# Patient Record
Sex: FEMALE | Race: WHITE | Employment: UNEMPLOYED | URBAN - METROPOLITAN AREA
[De-identification: names, ages, dates, MRNs, and addresses within clinical notes are randomized per-mention and may not be internally consistent; named-entity substitution may affect disease eponyms.]

---

## 2017-05-31 PROBLEM — R42 DIZZINESS: Status: ACTIVE | Noted: 2017-05-31

## 2017-06-05 ENCOUNTER — HOSPITAL ENCOUNTER (OUTPATIENT)
Dept: MAMMOGRAPHY | Age: 57
Discharge: HOME | End: 2017-06-05
Attending: INTERNAL MEDICINE
Payer: COMMERCIAL

## 2017-06-05 DIAGNOSIS — M85.80 OSTEOPENIA, UNSPECIFIED LOCATION: ICD-10-CM

## 2017-06-05 DIAGNOSIS — Z72.0 TOBACCO USE: ICD-10-CM

## 2017-06-05 DIAGNOSIS — Z78.0 POSTMENOPAUSAL: ICD-10-CM

## 2017-06-05 PROCEDURE — 77080 DXA BONE DENSITY AXIAL: CPT

## 2017-06-08 ENCOUNTER — APPOINTMENT (OUTPATIENT)
Dept: GENERAL RADIOLOGY | Age: 57
DRG: 189 | End: 2017-06-08
Attending: EMERGENCY MEDICINE
Payer: COMMERCIAL

## 2017-06-08 ENCOUNTER — HOSPITAL ENCOUNTER (INPATIENT)
Age: 57
LOS: 4 days | Discharge: HOME HEALTH | DRG: 189 | End: 2017-06-12
Attending: EMERGENCY MEDICINE | Admitting: INTERNAL MEDICINE
Payer: COMMERCIAL

## 2017-06-08 DIAGNOSIS — I10 ESSENTIAL HYPERTENSION: Chronic | ICD-10-CM

## 2017-06-08 DIAGNOSIS — J44.1 COPD EXACERBATION (HCC): ICD-10-CM

## 2017-06-08 DIAGNOSIS — J18.9 PNEUMONIA OF BOTH LOWER LOBES DUE TO INFECTIOUS ORGANISM: Primary | ICD-10-CM

## 2017-06-08 DIAGNOSIS — D75.1 POLYCYTHEMIA: ICD-10-CM

## 2017-06-08 DIAGNOSIS — R09.02 HYPOXEMIA: ICD-10-CM

## 2017-06-08 DIAGNOSIS — J40 TRACHEOBRONCHITIS: ICD-10-CM

## 2017-06-08 DIAGNOSIS — J42 CHRONIC BRONCHITIS, UNSPECIFIED CHRONIC BRONCHITIS TYPE (HCC): Chronic | ICD-10-CM

## 2017-06-08 DIAGNOSIS — R06.02 SHORTNESS OF BREATH: ICD-10-CM

## 2017-06-08 DIAGNOSIS — F41.9 ANXIETY: Chronic | ICD-10-CM

## 2017-06-08 DIAGNOSIS — Z72.0 TOBACCO USE: Chronic | ICD-10-CM

## 2017-06-08 PROBLEM — R06.00 DYSPNEA: Status: ACTIVE | Noted: 2017-06-08

## 2017-06-08 PROBLEM — J44.9 COPD (CHRONIC OBSTRUCTIVE PULMONARY DISEASE) (HCC): Chronic | Status: ACTIVE | Noted: 2017-06-08

## 2017-06-08 LAB
ALBUMIN SERPL BCP-MCNC: 3.4 G/DL (ref 3.5–5)
ALBUMIN/GLOB SERPL: 0.6 {RATIO} (ref 1.2–3.5)
ALP SERPL-CCNC: 103 U/L (ref 50–136)
ALT SERPL-CCNC: 25 U/L (ref 12–65)
ANION GAP BLD CALC-SCNC: 11 MMOL/L (ref 7–16)
AST SERPL W P-5'-P-CCNC: 51 U/L (ref 15–37)
BASOPHILS # BLD AUTO: 0 K/UL (ref 0–0.2)
BASOPHILS # BLD: 0 % (ref 0–2)
BILIRUB SERPL-MCNC: 0.8 MG/DL (ref 0.2–1.1)
BUN SERPL-MCNC: 8 MG/DL (ref 6–23)
CALCIUM SERPL-MCNC: 9.1 MG/DL (ref 8.3–10.4)
CHLORIDE SERPL-SCNC: 104 MMOL/L (ref 98–107)
CO2 SERPL-SCNC: 23 MMOL/L (ref 21–32)
CREAT SERPL-MCNC: 0.76 MG/DL (ref 0.6–1)
DIFFERENTIAL METHOD BLD: ABNORMAL
EOSINOPHIL # BLD: 0.1 K/UL (ref 0–0.8)
EOSINOPHIL NFR BLD: 1 % (ref 0.5–7.8)
ERYTHROCYTE [DISTWIDTH] IN BLOOD BY AUTOMATED COUNT: 14.3 % (ref 11.9–14.6)
GLOBULIN SER CALC-MCNC: 5.6 G/DL (ref 2.3–3.5)
GLUCOSE SERPL-MCNC: 115 MG/DL (ref 65–100)
HCT VFR BLD AUTO: 47.2 % (ref 35.8–46.3)
HGB BLD-MCNC: 16.5 G/DL (ref 11.7–15.4)
IMM GRANULOCYTES # BLD: 0 K/UL (ref 0–0.5)
IMM GRANULOCYTES NFR BLD AUTO: 0.3 % (ref 0–5)
LACTATE BLD-SCNC: 1.1 MMOL/L (ref 0.5–1.9)
LYMPHOCYTES # BLD AUTO: 9 % (ref 13–44)
LYMPHOCYTES # BLD: 1.2 K/UL (ref 0.5–4.6)
MCH RBC QN AUTO: 30.6 PG (ref 26.1–32.9)
MCHC RBC AUTO-ENTMCNC: 35 G/DL (ref 31.4–35)
MCV RBC AUTO: 87.4 FL (ref 79.6–97.8)
MONOCYTES # BLD: 1.1 K/UL (ref 0.1–1.3)
MONOCYTES NFR BLD AUTO: 8 % (ref 4–12)
NEUTS SEG # BLD: 10.8 K/UL (ref 1.7–8.2)
NEUTS SEG NFR BLD AUTO: 82 % (ref 43–78)
PLATELET # BLD AUTO: 285 K/UL (ref 150–450)
PMV BLD AUTO: 9.2 FL (ref 10.8–14.1)
POTASSIUM SERPL-SCNC: 3.8 MMOL/L (ref 3.5–5.1)
POTASSIUM SERPL-SCNC: 5.5 MMOL/L (ref 3.5–5.1)
PROT SERPL-MCNC: 9 G/DL (ref 6.3–8.2)
RBC # BLD AUTO: 5.4 M/UL (ref 4.05–5.25)
SODIUM SERPL-SCNC: 138 MMOL/L (ref 136–145)
TROPONIN I SERPL-MCNC: <0.02 NG/ML (ref 0.02–0.05)
WBC # BLD AUTO: 13.2 K/UL (ref 4.3–11.1)

## 2017-06-08 PROCEDURE — 94664 DEMO&/EVAL PT USE INHALER: CPT

## 2017-06-08 PROCEDURE — 87040 BLOOD CULTURE FOR BACTERIA: CPT | Performed by: EMERGENCY MEDICINE

## 2017-06-08 PROCEDURE — 85025 COMPLETE CBC W/AUTO DIFF WBC: CPT | Performed by: EMERGENCY MEDICINE

## 2017-06-08 PROCEDURE — 83605 ASSAY OF LACTIC ACID: CPT

## 2017-06-08 PROCEDURE — 65270000029 HC RM PRIVATE

## 2017-06-08 PROCEDURE — 71020 XR CHEST PA LAT: CPT

## 2017-06-08 PROCEDURE — 99284 EMERGENCY DEPT VISIT MOD MDM: CPT | Performed by: EMERGENCY MEDICINE

## 2017-06-08 PROCEDURE — 74011000250 HC RX REV CODE- 250: Performed by: EMERGENCY MEDICINE

## 2017-06-08 PROCEDURE — 84132 ASSAY OF SERUM POTASSIUM: CPT | Performed by: EMERGENCY MEDICINE

## 2017-06-08 PROCEDURE — 74011250637 HC RX REV CODE- 250/637: Performed by: INTERNAL MEDICINE

## 2017-06-08 PROCEDURE — 99223 1ST HOSP IP/OBS HIGH 75: CPT | Performed by: INTERNAL MEDICINE

## 2017-06-08 PROCEDURE — 74011000250 HC RX REV CODE- 250: Performed by: INTERNAL MEDICINE

## 2017-06-08 PROCEDURE — 80053 COMPREHEN METABOLIC PANEL: CPT | Performed by: EMERGENCY MEDICINE

## 2017-06-08 PROCEDURE — 84484 ASSAY OF TROPONIN QUANT: CPT | Performed by: EMERGENCY MEDICINE

## 2017-06-08 PROCEDURE — 74011250636 HC RX REV CODE- 250/636: Performed by: INTERNAL MEDICINE

## 2017-06-08 PROCEDURE — 94640 AIRWAY INHALATION TREATMENT: CPT

## 2017-06-08 PROCEDURE — 93005 ELECTROCARDIOGRAM TRACING: CPT | Performed by: EMERGENCY MEDICINE

## 2017-06-08 PROCEDURE — 74011000258 HC RX REV CODE- 258: Performed by: EMERGENCY MEDICINE

## 2017-06-08 RX ORDER — SODIUM CHLORIDE 0.9 % (FLUSH) 0.9 %
5-10 SYRINGE (ML) INJECTION EVERY 8 HOURS
Status: DISCONTINUED | OUTPATIENT
Start: 2017-06-08 | End: 2017-06-12 | Stop reason: HOSPADM

## 2017-06-08 RX ORDER — AMLODIPINE BESYLATE 5 MG/1
5 TABLET ORAL DAILY
Status: DISCONTINUED | OUTPATIENT
Start: 2017-06-09 | End: 2017-06-12 | Stop reason: HOSPADM

## 2017-06-08 RX ORDER — ASPIRIN 81 MG/1
81 TABLET ORAL DAILY
Status: DISCONTINUED | OUTPATIENT
Start: 2017-06-09 | End: 2017-06-12 | Stop reason: HOSPADM

## 2017-06-08 RX ORDER — ENOXAPARIN SODIUM 100 MG/ML
40 INJECTION SUBCUTANEOUS EVERY 24 HOURS
Status: DISCONTINUED | OUTPATIENT
Start: 2017-06-08 | End: 2017-06-12 | Stop reason: HOSPADM

## 2017-06-08 RX ORDER — SODIUM CHLORIDE 0.9 % (FLUSH) 0.9 %
5-10 SYRINGE (ML) INJECTION AS NEEDED
Status: DISCONTINUED | OUTPATIENT
Start: 2017-06-08 | End: 2017-06-12 | Stop reason: HOSPADM

## 2017-06-08 RX ORDER — HYDROCODONE BITARTRATE AND HOMATROPINE METHYLBROMIDE 1.5; 5 MG/5ML; MG/5ML
5 SYRUP ORAL
Status: DISCONTINUED | OUTPATIENT
Start: 2017-06-08 | End: 2017-06-12 | Stop reason: HOSPADM

## 2017-06-08 RX ORDER — IPRATROPIUM BROMIDE AND ALBUTEROL SULFATE 2.5; .5 MG/3ML; MG/3ML
3 SOLUTION RESPIRATORY (INHALATION)
Status: DISCONTINUED | OUTPATIENT
Start: 2017-06-08 | End: 2017-06-12 | Stop reason: HOSPADM

## 2017-06-08 RX ORDER — BUDESONIDE 0.5 MG/2ML
500 INHALANT ORAL
Status: DISCONTINUED | OUTPATIENT
Start: 2017-06-08 | End: 2017-06-12 | Stop reason: HOSPADM

## 2017-06-08 RX ORDER — LORAZEPAM 0.5 MG/1
0.5 TABLET ORAL
Status: DISCONTINUED | OUTPATIENT
Start: 2017-06-08 | End: 2017-06-12 | Stop reason: HOSPADM

## 2017-06-08 RX ORDER — BENZONATATE 100 MG/1
100 CAPSULE ORAL
Status: DISCONTINUED | OUTPATIENT
Start: 2017-06-08 | End: 2017-06-12 | Stop reason: HOSPADM

## 2017-06-08 RX ORDER — ISOSORBIDE MONONITRATE 60 MG/1
120 TABLET, EXTENDED RELEASE ORAL
Status: DISCONTINUED | OUTPATIENT
Start: 2017-06-09 | End: 2017-06-12 | Stop reason: HOSPADM

## 2017-06-08 RX ORDER — TRAMADOL HYDROCHLORIDE 50 MG/1
50 TABLET ORAL
Status: DISCONTINUED | OUTPATIENT
Start: 2017-06-08 | End: 2017-06-12 | Stop reason: HOSPADM

## 2017-06-08 RX ORDER — IBUPROFEN 200 MG
1 TABLET ORAL DAILY
Status: DISCONTINUED | OUTPATIENT
Start: 2017-06-09 | End: 2017-06-12 | Stop reason: HOSPADM

## 2017-06-08 RX ORDER — PRAVASTATIN SODIUM 20 MG/1
20 TABLET ORAL DAILY
Status: DISCONTINUED | OUTPATIENT
Start: 2017-06-09 | End: 2017-06-12 | Stop reason: HOSPADM

## 2017-06-08 RX ORDER — ATENOLOL 25 MG/1
25 TABLET ORAL DAILY
Status: DISCONTINUED | OUTPATIENT
Start: 2017-06-09 | End: 2017-06-12 | Stop reason: HOSPADM

## 2017-06-08 RX ORDER — IBUPROFEN 200 MG
1 TABLET ORAL DAILY
Status: DISCONTINUED | OUTPATIENT
Start: 2017-06-09 | End: 2017-06-08

## 2017-06-08 RX ADMIN — BENZONATATE 100 MG: 100 CAPSULE ORAL at 21:25

## 2017-06-08 RX ADMIN — DOXYCYCLINE 100 MG: 100 INJECTION, POWDER, LYOPHILIZED, FOR SOLUTION INTRAVENOUS at 16:46

## 2017-06-08 RX ADMIN — HYDROCODONE BITARTRATE AND HOMATROPINE METHYLBROMIDE 5 ML: 5; 1.5 SOLUTION ORAL at 21:25

## 2017-06-08 RX ADMIN — ENOXAPARIN SODIUM 40 MG: 40 INJECTION SUBCUTANEOUS at 20:04

## 2017-06-08 RX ADMIN — BUDESONIDE 500 MCG: 0.5 SUSPENSION RESPIRATORY (INHALATION) at 19:43

## 2017-06-08 RX ADMIN — Medication 10 ML: at 23:36

## 2017-06-08 RX ADMIN — IPRATROPIUM BROMIDE AND ALBUTEROL SULFATE 3 ML: 2.5; .5 SOLUTION RESPIRATORY (INHALATION) at 19:43

## 2017-06-08 RX ADMIN — METHYLPREDNISOLONE SODIUM SUCCINATE 40 MG: 40 INJECTION, POWDER, FOR SOLUTION INTRAMUSCULAR; INTRAVENOUS at 21:25

## 2017-06-08 NOTE — ED TRIAGE NOTES
Pt states shortness of breath with chest congestion for the past couple days. Pt states pain to the left upper chest and upper left back that is worse with a cough. Pt states green sputum coming from her chest and nose.

## 2017-06-08 NOTE — ED PROVIDER NOTES
HPI Comments: Patient is a 63 yo female with cough and SOB. States has history of severe COPD, followed by Dr. Melissa Velasquez from pulmonology and states he placed her on augmentin 2 weeks ago and finished her course about 1 week ago however with continually worsening cough and SOB. States cough productive of yellow/green sputum and states she was told to come in by her pulmonologist for IV abx. She denies any abdominal pain, no nausea or vomiting, no further complaints. States chest pain with coughing    Patient is a 62 y.o. female presenting with shortness of breath. The history is provided by the patient. No  was used. Shortness of Breath   Associated symptoms include cough. Pertinent negatives include no fever, no headaches, no rhinorrhea, no sore throat, no neck pain, no vomiting, no abdominal pain, no rash and no leg swelling. Past Medical History:   Diagnosis Date    Acute renal failure ([address] 12/5/2013    Baseline creatinine was 0.8, and currently it is 2.9     Acute respiratory failure ([address] 12/5/2013    Arthritis     Back pain     CAD (coronary artery disease)     CAP (community acquired pneumonia) 12/7/2013    Chronic obstructive pulmonary disease (HCC)     Chronic pain     fibromyalgia    Cystitis     Diabetes ([address]     Difficult intubation 2005    with spine surgery at [facility] hypertension     GERD (gastroesophageal reflux disease)     Hyperlipidemia     Hypoproteinemia ([address] 12/11/2013    Hypotension 12/5/2013    Hypoxemia 12/20/2013    follow up overnight oximetry on room air 3/2014 - resolved.     Ill-defined condition     hx of IC     Irritable bowel syndrome     Liver disease     fatty liver    Migraine headache     Myalgia     Pleural effusion 12/17/2013    Left: 450 ml of fluid was obtained       Positive occult stool blood test 12/9/2013    Psychiatric disorder     Respiratory failure ([address] 12/2013    required intubation    Sepsis ([address] 12/7/2013    Septicemia ([facility] Dec 2013    no BP, pneumonia, \"Everything was failing\"- on vent 12/6-12/17    Unspecified adverse effect of anesthesia     took a long time to wake up 2005    Upper GI bleed 12/10/2013    GI evaluated        Past Surgical History:   Procedure Laterality Date    HX CHOLECYSTECTOMY      HX HEART CATHETERIZATION  2011    Mild CAD per Dr Adrienne Palomo  12/29/2016    LAD:30% stenosis with \"sluggish flow\",Dx:30% stenosis. LCX OM:40-50% stenosis with - FFR,Small accesssory OM:70% ostial stenosis in 1 mmvessel,and RCA:30% diffuse CAD.  HX HYSTERECTOMY      hyesterectomy    HX ORTHOPAEDIC      4 back surgeries    HX ORTHOPAEDIC Right 10/2014    elbow    NEUROLOGICAL PROCEDURE UNLISTED  2000 & 2005    spine X 4         Family History:   Problem Relation Age of Onset    Cancer Mother      colon    Alzheimer Mother     Heart Disease Father        Social History     Social History    Marital status:      Spouse name: N/A    Number of children: N/A    Years of education: N/A     Occupational History    disabled Not Employed     Social History Main Topics    Smoking status: Current Every Day Smoker     Packs/day: 1.00     Years: 41.00    Smokeless tobacco: Never Used      Comment: currently smoking 1/2 or less    Alcohol use 0.0 oz/week     0 Standard drinks or equivalent per week      Comment: RARE    Drug use: No    Sexual activity: Not Currently     Other Topics Concern    Not on file     Social History Narrative    Pt is  and lives with her . She on disability. ALLERGIES: Latex, natural rubber; Egg; Avelox [moxifloxacin]; Banana; Contrast dye [iodine]; Sudafed [pseudoephedrine hcl]; Valium [diazepam]; and Watermelon    Review of Systems   Constitutional: Negative for chills and fever. HENT: Negative for rhinorrhea and sore throat. Eyes: Negative for visual disturbance.    Respiratory: Positive for cough and shortness of breath. Cardiovascular: Negative for leg swelling. Gastrointestinal: Negative for abdominal pain, diarrhea, nausea and vomiting. Genitourinary: Negative for dysuria. Musculoskeletal: Negative for back pain and neck pain. Skin: Negative for rash. Neurological: Negative for weakness and headaches. Psychiatric/Behavioral: The patient is not nervous/anxious. Vitals:    06/08/17 1230   BP: 123/69   Pulse: (!) 125   Resp: 18   Temp: 97.6 °F (36.4 °C)   SpO2: 90%   Weight: 76.2 kg (168 lb)   Height: 5' 6\" (1.676 m)            Physical Exam   Constitutional: She is oriented to person, place, and time. She appears well-developed and well-nourished. HENT:   Head: Normocephalic. Right Ear: External ear normal.   Left Ear: External ear normal.   Eyes: Conjunctivae and EOM are normal. Pupils are equal, round, and reactive to light. Neck: Normal range of motion. Neck supple. No tracheal deviation present. Cardiovascular: Normal rate, regular rhythm, normal heart sounds and intact distal pulses. No murmur heard. Pulmonary/Chest: She has wheezes (diffuse wheezes through-out). Abdominal: Soft. There is no tenderness. Musculoskeletal: Normal range of motion. Neurological: She is alert and oriented to person, place, and time. No cranial nerve deficit. Skin: No rash noted. Nursing note and vitals reviewed.        MDM  Number of Diagnoses or Management Options     Amount and/or Complexity of Data Reviewed  Clinical lab tests: ordered and reviewed  Tests in the radiology section of CPT®: ordered and reviewed  Tests in the medicine section of CPT®: ordered and reviewed  Review and summarize past medical records: yes  Discuss the patient with other providers: yes (pulmonology)    Risk of Complications, Morbidity, and/or Mortality  Presenting problems: high  Diagnostic procedures: high  Management options: high    Patient Progress  Patient progress: stable    ED Course Procedures    Recent Results (from the past 12 hour(s))   EKG, 12 LEAD, INITIAL    Collection Time: 06/08/17 12:31 PM   Result Value Ref Range    Ventricular Rate 119 BPM    Atrial Rate 119 BPM    P-R Interval 130 ms    QRS Duration 68 ms    Q-T Interval 324 ms    QTC Calculation (Bezet) 455 ms    Calculated P Axis 73 degrees    Calculated R Axis 48 degrees    Calculated T Axis 84 degrees    Diagnosis       Sinus tachycardia  Possible Left atrial enlargement  Borderline ECG  When compared with ECG of 05-DEC-2013 19:05,  Vent. rate has increased BY  49 BPM  T wave inversion no longer evident in Anterior leads  Nonspecific T wave abnormality now evident in Lateral leads     TROPONIN I    Collection Time: 06/08/17 12:40 PM   Result Value Ref Range    Troponin-I, Qt. <0.02 (L) 0.02 - 0.05 NG/ML   CBC WITH AUTOMATED DIFF    Collection Time: 06/08/17 12:40 PM   Result Value Ref Range    WBC 13.2 (H) 4.3 - 11.1 K/uL    RBC 5.40 (H) 4.05 - 5.25 M/uL    HGB 16.5 (H) 11.7 - 15.4 g/dL    HCT 47.2 (H) 35.8 - 46.3 %    MCV 87.4 79.6 - 97.8 FL    MCH 30.6 26.1 - 32.9 PG    MCHC 35.0 31.4 - 35.0 g/dL    RDW 14.3 11.9 - 14.6 %    PLATELET 146 110 - 612 K/uL    MPV 9.2 (L) 10.8 - 14.1 FL    DF AUTOMATED      NEUTROPHILS 82 (H) 43 - 78 %    LYMPHOCYTES 9 (L) 13 - 44 %    MONOCYTES 8 4.0 - 12.0 %    EOSINOPHILS 1 0.5 - 7.8 %    BASOPHILS 0 0.0 - 2.0 %    IMMATURE GRANULOCYTES 0.3 0.0 - 5.0 %    ABS. NEUTROPHILS 10.8 (H) 1.7 - 8.2 K/UL    ABS. LYMPHOCYTES 1.2 0.5 - 4.6 K/UL    ABS. MONOCYTES 1.1 0.1 - 1.3 K/UL    ABS. EOSINOPHILS 0.1 0.0 - 0.8 K/UL    ABS. BASOPHILS 0.0 0.0 - 0.2 K/UL    ABS. IMM.  GRANS. 0.0 0.0 - 0.5 K/UL   METABOLIC PANEL, COMPREHENSIVE    Collection Time: 06/08/17 12:40 PM   Result Value Ref Range    Sodium 138 136 - 145 mmol/L    Potassium 5.5 (H) 3.5 - 5.1 mmol/L    Chloride 104 98 - 107 mmol/L    CO2 23 21 - 32 mmol/L    Anion gap 11 7 - 16 mmol/L    Glucose 115 (H) 65 - 100 mg/dL    BUN 8 6 - 23 MG/DL Creatinine 0.76 0.6 - 1.0 MG/DL    GFR est AA >60 >60 ml/min/1.73m2    GFR est non-AA >60 >60 ml/min/1.73m2    Calcium 9.1 8.3 - 10.4 MG/DL    Bilirubin, total 0.8 0.2 - 1.1 MG/DL    ALT (SGPT) 25 12 - 65 U/L    AST (SGOT) 51 (H) 15 - 37 U/L    Alk. phosphatase 103 50 - 136 U/L    Protein, total 9.0 (H) 6.3 - 8.2 g/dL    Albumin 3.4 (L) 3.5 - 5.0 g/dL    Globulin 5.6 (H) 2.3 - 3.5 g/dL    A-G Ratio 0.6 (L) 1.2 - 3.5     Dexa Bone Density Study Axial    Result Date: 6/5/2017  Bone Mineral Density  Indication: Post-menopausal female screening, previous hormone replacement therapy and hysterectomy, smoker, family history of osteoporosis and personal history of bone fracture, height loss  Comparison: None. Findings:  Lumbar spine: L1-L4 Bone mineral density (gm/cm2):  1.175 T score:  -0.2 Z score:  0 point  Hip: Left femoral neck Bone mineral density (gm/cm2):  0.895 T score:  -1.0 Z score:  -0.2 10 year fracture risk: Major osteoporotic:  32% Hip fracture:  2%      Impression: Using the World Health Organization criteria, the bone mineral density is normal.    Xr Chest Pa Lat    Result Date: 6/8/2017  PA and lateral chest radiographs History: Acute sob, 57 years Female Comparison: Chest radiograph February 27, 2014 Findings:  Normal cardiomediastinal silhouette. Mild diffuse interstitial prominence appears unchanged, nonspecific. Slightly increased streaky bibasilar airspace opacities, may represent developing atelectasis and or consolidation. Increased trace right pleural effusion. No evidence of pneumothorax. Visualized soft tissue and osseous structures otherwise unremarkable. Impression:  Increased streaky bibasilar atelectasis and or consolidation.                                                                                                                                                                               Patient is a 63 yo female with pneumonia:     Discussed with Dr. Taylor Stock, placed on doxy IV and admission for further management at this time.

## 2017-06-08 NOTE — H&P
HISTORY AND PHYSICAL      Ann Miller    6/8/2017    Date of Admission:  6/8/2017    The patient's chart is reviewed and the patient is discussed with the staff. Subjective:     Patient is a 62 y.o.  female presents with progressive shortness of breath, productive cough with green sputum and dyspnea on exertion with expiratory wheezing. Has felt like she has a fever but has not taken temperature. Recently prescribed Augmentin and completed course but the next day symptoms recurred and has been feeling poorly for about 1 week. Is continuing to smoke but has felt so bad had only smoked 1/2 PPD. She uses home O2 at night 2L but has not been very compliant because \"it dries me out and I can't get up the mucus\". Home O2 2L with sleep. Review of Systems  A comprehensive review of systems was negative except for: Constitutional: positive for sweats, fatigue, malaise and anorexia  Respiratory: positive for cough, sputum, wheezing, dyspnea on exertion or COPD, bronchitis, O2 at night  Musculoskeletal: positive for fibromyalgia  Behvioral/Psych: positive for depression and tobacco use  Endocrine: positive for DM    Patient Active Problem List   Diagnosis Code    COPD [facility]ch[facility]) ([facility] J44.9    Hypertension I10    Fibromyalgia M79.7    Tobacco use Z72.0    Arthritis M19.90    Snoring R06.83    ASCVD (arteriosclerotic cardiovascular disease) I25.10    Mixed hyperlipidemia E78.2    Depression F32.9    Anxiety F41.9    Osteoarthritis M19.90    Elevated blood sugar R73.9    Dependence on nicotine from cigarettes F17.210    Hypoxia R09.02    Dizziness R42    COPD exacerbation (HCC) J44.1    Tracheobronchitis J40    Dyspnea R06.00       Prior to Admission Medications   Prescriptions Last Dose Informant Patient Reported? Taking? LORazepam (ATIVAN) 0.5 mg tablet   No No   Sig: Take one tablet three times a day when needed.    OXYGEN-AIR DELIVERY SYSTEMS   Yes No   Si L by Nasal route nightly. aclidinium bromide (TUDORZA PRESSAIR) 400 mcg/actuation inhaler   No No   Sig: Take 1 Puff by inhalation two (2) times a day. albuterol (PROAIR HFA) 90 mcg/actuation inhaler   No No   Si puffs 4 times daily prn   albuterol (PROVENTIL VENTOLIN) 2.5 mg /3 mL (0.083 %) nebulizer solution   No No   Si vial via nebulizer qid prn   amLODIPine (NORVASC) 5 mg tablet   No No   Sig: Take 1 Tab by mouth daily. aspirin delayed-release 81 mg tablet   No No   Sig: Take 1 Tab by mouth daily. atenolol (TENORMIN) 25 mg tablet   No No   Sig: Take 1 Tab by mouth daily. budesonide-formoterol (SYMBICORT) 160-4.5 mcg/actuation HFA inhaler   No No   Si puffs bid, rinse mouth after use   cyclobenzaprine (FLEXERIL) 10 mg tablet   No No   Sig: Take 1 Tab by mouth three (3) times daily as needed for Muscle Spasm(s). isosorbide mononitrate ER (IMDUR) 120 mg CR tablet   No No   Sig: Take 1 Tab by mouth every morning. meclizine (ANTIVERT) 25 mg tablet   No No   Sig: Take 1 Tab by mouth three (3) times daily as needed. Indications: VERTIGO   metFORMIN (GLUCOPHAGE) 500 mg tablet   No No   Sig: Take 1 Tab by mouth daily (with breakfast). nitroglycerin (NITROSTAT) 0.4 mg SL tablet   No No   Si Tab by SubLINGual route every five (5) minutes as needed for Chest Pain.   pravastatin (PRAVACHOL) 20 mg tablet   No No   Sig: Take 1 Tab by mouth daily. traMADol (ULTRAM) 50 mg tablet   No No   Sig: Take 1 Tab by mouth every six (6) hours as needed for Pain. Max Daily Amount: 200 mg.   traZODone (DESYREL) 150 mg tablet   No No   Sig: Take 1 Tab by mouth nightly.       Facility-Administered Medications: None       Past Medical History:   Diagnosis Date    Acute [facility] failure ([facility] 2013    Baseline creatinine was 0.8, and currently it is 2.9     Acute respiratory failure (HCC) 2013    Arthritis     Back pain     CAD (coronary artery disease)     CAP (community acquired pneumonia) 12/7/2013    Chronic obstructive pulmonary disease ([address]     Chronic pain     fibromyalgia    Cystitis     Diabetes ([address]     Difficult intubation 2005    with spine surgery at [address] hypertension     GERD (gastroesophageal reflux disease)     Hyperlipidemia     Hypoproteinemia ([address] 12/11/2013    Hypotension 12/5/2013    Hypoxemia 12/20/2013    follow up overnight oximetry on room air 3/2014 - resolved.  Ill-defined condition     hx of IC     Irritable bowel syndrome     Liver disease     fatty liver    Migraine headache     Myalgia     Pleural effusion 12/17/2013    Left: 450 ml of fluid was obtained       Positive occult stool blood test 12/9/2013    Psychiatric disorder     Respiratory failure ([address] 12/2013    required intubation    Sepsis ([address] 12/7/2013    Septicemia ([facility] Dec 2013    no BP, pneumonia, \"Everything was failing\"- on vent 12/6-12/17    Unspecified adverse effect of anesthesia     took a long time to wake up 2005    Upper GI bleed 12/10/2013    GI evaluated      Past Surgical History:   Procedure Laterality Date    HX CHOLECYSTECTOMY      HX HEART CATHETERIZATION  2011    Mild CAD per Dr Jesus Yarbrough  12/29/2016    LAD:30% stenosis with \"sluggish flow\",Dx:30% stenosis. LCX OM:40-50% stenosis with - FFR,Small accesssory OM:70% ostial stenosis in 1 mmvessel,and RCA:30% diffuse CAD.     HX HYSTERECTOMY      hyesterectomy    HX ORTHOPAEDIC      4 back surgeries    HX ORTHOPAEDIC Right 10/2014    elbow    NEUROLOGICAL PROCEDURE UNLISTED  2000 & 2005    spine X 4     Social History     Social History    Marital status:      Spouse name: N/A    Number of children: N/A    Years of education: N/A     Occupational History    disabled Not Employed     Social History Main Topics    Smoking status: Current Every Day Smoker     Packs/day: 1.00     Years: 41.00    Smokeless tobacco: Never Used Comment: currently smoking 1/2 or less    Alcohol use 0.0 oz/week     0 Standard drinks or equivalent per week      Comment: RARE    Drug use: No    Sexual activity: Not Currently     Other Topics Concern    Not on file     Social History Narrative    Pt is  and lives with her . She on disability. Family History   Problem Relation Age of Onset    Cancer Mother      colon    Alzheimer Mother     Heart Disease Father      Allergies   Allergen Reactions    Latex, Natural Rubber Itching     hives    Egg Itching     Itching with some tongue swelling and nausea    Avelox [Moxifloxacin] Swelling    Banana Swelling    Contrast Dye [Iodine] Nausea and Vomiting    Sudafed [Pseudoephedrine Hcl] Palpitations    Valium [Diazepam] Other (comments)     angry    Watermelon Swelling       Current Facility-Administered Medications   Medication Dose Route Frequency    doxycycline (VIBRAMYCIN) 100 mg in 0.9% sodium chloride (MBP/ADV) 100 mL  100 mg IntraVENous Q12H     Current Outpatient Prescriptions   Medication Sig    metFORMIN (GLUCOPHAGE) 500 mg tablet Take 1 Tab by mouth daily (with breakfast).  meclizine (ANTIVERT) 25 mg tablet Take 1 Tab by mouth three (3) times daily as needed. Indications: VERTIGO    amLODIPine (NORVASC) 5 mg tablet Take 1 Tab by mouth daily.  pravastatin (PRAVACHOL) 20 mg tablet Take 1 Tab by mouth daily.  atenolol (TENORMIN) 25 mg tablet Take 1 Tab by mouth daily.  albuterol (PROVENTIL VENTOLIN) 2.5 mg /3 mL (0.083 %) nebulizer solution 1 vial via nebulizer qid prn    traMADol (ULTRAM) 50 mg tablet Take 1 Tab by mouth every six (6) hours as needed for Pain. Max Daily Amount: 200 mg.  aspirin delayed-release 81 mg tablet Take 1 Tab by mouth daily.  albuterol (PROAIR HFA) 90 mcg/actuation inhaler 2 puffs 4 times daily prn    isosorbide mononitrate ER (IMDUR) 120 mg CR tablet Take 1 Tab by mouth every morning.     aclidinium bromide (Luis Cunningham) 400 mcg/actuation inhaler Take 1 Puff by inhalation two (2) times a day.  budesonide-formoterol (SYMBICORT) 160-4.5 mcg/actuation HFA inhaler 2 puffs bid, rinse mouth after use    LORazepam (ATIVAN) 0.5 mg tablet Take one tablet three times a day when needed.  cyclobenzaprine (FLEXERIL) 10 mg tablet Take 1 Tab by mouth three (3) times daily as needed for Muscle Spasm(s).  traZODone (DESYREL) 150 mg tablet Take 1 Tab by mouth nightly.  nitroglycerin (NITROSTAT) 0.4 mg SL tablet 1 Tab by SubLINGual route every five (5) minutes as needed for Chest Pain.  OXYGEN-AIR DELIVERY SYSTEMS 2 L by Nasal route nightly. Objective:     Vitals:    06/08/17 1230 06/08/17 1509   BP: 123/69    Pulse: (!) 125    Resp: 18    Temp: 97.6 °F (36.4 °C)    SpO2: 90% (!) 89%   Weight: 168 lb (76.2 kg)    Height: 5' 6\" (1.676 m)        PHYSICAL EXAM     Constitutional:  the patient is well developed and in no acute distress, NC 2L, sat 92%  EENMT:  Sclera clear, pupils equal, oral mucosa moist  Respiratory: scattered crackles, no wheezing, productive cough with yellow/green sputum  Cardiovascular:  RRR without M,G,R  Gastrointestinal: soft and non-tender; with positive bowel sounds. Musculoskeletal: warm without cyanosis. There is no lower leg edema. Skin:  no jaundice or rashes, no wounds, tattoos    Neurologic: no gross neuro deficits     Psychiatric:  alert and oriented x 3    CXR:      Recent Labs      06/08/17   1240   WBC  13.2*   HGB  16.5*   HCT  47.2*   PLT  285     Recent Labs      06/08/17   1240   NA  138   K  5.5*   CL  104   GLU  115*   CO2  23   BUN  8   CREA  0.76   CA  9.1   TROIQ  <0.02*   ALB  3.4*   TBILI  0.8   ALT  25   SGOT  51*     No results for input(s): PH, PCO2, PO2, HCO3 in the last 72 hours. No results for input(s): LCAD, LAC in the last 72 hours.     Assessment:  (Medical Decision Making)     Hospital Problems  Date Reviewed: 6/8/2017          Codes Class Noted POA    COPD (chronic obstructive pulmonary disease) (HCC) (Chronic) ICD-10-CM: J44.9  ICD-9-CM: 496  6/8/2017 Yes        Tobacco use (Chronic) ICD-10-CM: Z72.0  ICD-9-CM: 305.1  6/8/2017 Yes        COP[facility] exacerbation ([facility] ICD-10-CM: J44.1  ICD-9-CM: 491.21  6/8/2017 Yes        Tracheobronchitis ICD-10-CM: J40  ICD-9-CM: 980  6/8/2017 Yes        Dyspnea ICD-10-CM: R06.00  ICD-9-CM: 786.09  6/8/2017 Yes              Plan:  (Medical Decision Making)     --Will admit for further medical management  --Continue supplemental O2.  uses 2L with sleep but will need to determine O2 requirements prior to discharge--may need O2 during the day  --Respiratory nebulizer treatments  --IV steroid therapy  --antibiotic therapy  --Smoking cessation strongly encouraged. More than 50% of the time documented was spent in face-to-face contact with the patient and in the care of the patient on the floor/unit where the patient is located. Jacquelyn Willis NP      Lungs:few crackles and coughing up yellow sputum  Heart S1 and S2 audible, no murmers or rubs appreciated  Other     Will admit -- hopefully better in next 48 hours to go home. I have spoken with and examined the patient. I have reviewed the history, examination, assessment, and plan and agree with the above. Stephanie David MD      This note was signed electronically. Errors are unfortunately her likely due to dictation software.

## 2017-06-08 NOTE — IP AVS SNAPSHOT
[address] 
[phone] Patient: Catracho Workman MRN: [id number] OYQ:6/7/8954 You are allergic to the following Allergen Reactions Latex, Natural Rubber Itching  
 hives Egg Itching Itching with some tongue swelling and nausea Avelox (Moxifloxacin) Swelling Banana Swelling Contrast Dye (Iodine) Nausea and Vomiting Sudafed (Pseudoephedrine Hcl) Palpitations Valium (Diazepam) Other (comments)  
 angry Watermelon Swelling Recent Documentation Height Weight BMI OB Status Smoking Status 1.676 m 76.2 kg 27.12 kg/m2 Hysterectomy Current Every Day Smoker Unresulted Labs Order Current Status CULTURE, BLOOD Preliminary result CULTURE, BLOOD Preliminary result Emergency Contacts Name Discharge Info Relation Home Work Mobile HerreraNeri DISCHARGE CAREGIVER [3] Spouse [3] [phone] About your hospitalization You were admitted on:  June 8, 2017 You last received care in the:  [facility] 7 MED SURG You were discharged on:  June 12, 2017 Unit phone number:  [phone] Why you were hospitalized Your primary diagnosis was:  Copd Exacerbation (Hcc) Your diagnoses also included: Tobacco Use, Tracheobronchitis, Hypoxemia, Essential Hypertension, Diabetes (Hcc), Polycythemia (Hcc) Providers Seen During Your Hospitalizations Provider Role Specialty Primary office phone Donato Fernandez MD Attending Provider Emergency Medicine [phone] Asia [place] MD Attending Provider Pulmonary Disease [phone] Your Primary Care Physician (PCP) Primary Care Physician Office Phone Office Fax Steven [facility] [phone] Follow-up Information Follow up With Details Comments Contact Info Eddi Alonso MD   [address] [place] [address] [address] 
[phone] [facility] On 7/31/2017 at 2:20 p.m. [address]  [address] [place] [address] 
[phone] Your Appointments Tuesday July 11, 2017  3:00 PM EDT Office Visit with Kell Burgess MD  
[facility] ([address]) [address]  
[address] Chrissy Howard   
[phone] Current Discharge Medication List  
  
START taking these medications Dose & Instructions Dispensing Information Comments Morning Noon Evening Bedtime  
 budesonide 0.5 mg/2 mL Nbsp Commonly known as:  PULMICORT Your next dose is: This evening 6/12/2017 Dose:  500 mcg 2 mL by Nebulization route two (2) times a day. Quantity:  60 Each Refills:  11  
     
  
   
   
  
   
  
 doxycycline 100 mg capsule Commonly known as:  Cresenciano Sharper Your next dose is: This evening after getting prescription filled 6/12/2017 Dose:  100 mg Take 1 Cap by mouth two (2) times a day. Quantity:  5 Cap Refills:  0  
     
  
   
   
  
   
  
 guaiFENesin 1,200 mg Ta12 ER tablet Commonly known as:  Guru & Guru Your next dose is: This evening 6/12/2017 Dose:  1200 mg Take 1 Tab by mouth two (2) times a day. Quantity:  60 Tab Refills:  1  
     
  
   
   
  
   
  
 predniSONE 10 mg tablet Commonly known as:  Euel Salaam Your next dose is: Follow instructions on prescription. Next dose: Tomorrow Morning 6/13/2017 Dose:  10 mg Take 1 Tab by mouth daily (with breakfast). 40mg per day for 3 days, then 30mg per day for 3 days, 20mg per day for 3 days then 10mg per day for 3 days, then stop. Quantity:  30 Tab Refills:  0 CONTINUE these medications which have CHANGED Dose & Instructions Dispensing Information Comments Morning Noon Evening Bedtime * albuterol 90 mcg/actuation inhaler Commonly known as:  PROAIR HFA What changed:  Another medication with the same name was changed. Make sure you understand how and when to take each. Your next dose is: Take on as needed schedule 2 puffs 4 times daily prn Quantity:  1 Inhaler Refills:  11  
     
   
   
   
  
 * albuterol 2.5 mg /3 mL (0.083 %) nebulizer solution Commonly known as:  PROVENTIL VENTOLIN What changed:   
- how much to take 
- how to take this - when to take this Your next dose is: Take on as needed schedule Dose:  2.5 mg  
3 mL by Nebulization route four (4) times daily. 1 vial via nebulizer qid prn Quantity:  120 Each Refills:  11 * Notice: This list has 2 medication(s) that are the same as other medications prescribed for you. Read the directions carefully, and ask your doctor or other care provider to review them with you. CONTINUE these medications which have NOT CHANGED Dose & Instructions Dispensing Information Comments Morning Noon Evening Bedtime  
 amLODIPine 5 mg tablet Commonly known as:  Delphina Peat Your next dose is:  Tomorrow Morning 6/13/2017 Dose:  5 mg Take 1 Tab by mouth daily. Quantity:  30 Tab Refills:  11  
     
  
   
   
   
  
 aspirin delayed-release 81 mg tablet Your next dose is:  Tomorrow Morning 6/13/2017 Dose:  81 mg Take 1 Tab by mouth daily. Quantity:  30 Tab Refills:  11  
     
  
   
   
   
  
 atenolol 25 mg tablet Commonly known as:  TENORMIN Your next dose is:  Tomorrow Morning 6/13/2017 Dose:  25 mg Take 1 Tab by mouth daily. Quantity:  30 Tab Refills:  11 CeleXA 10 mg tablet Generic drug:  citalopram  
Your next dose is:  Tomorrow Morning 6/13/2017 Dose:  10 mg Take 10 mg by mouth every morning. Refills:  0  
     
  
   
   
   
  
 cyclobenzaprine 10 mg tablet Commonly known as:  FLEXERIL  
 Your next dose is: Take on as needed schedule Dose:  10 mg Take 1 Tab by mouth three (3) times daily as needed for Muscle Spasm(s). Quantity:  30 Tab Refills:  5  
     
   
   
   
  
 isosorbide mononitrate  mg CR tablet Commonly known as:  IMDUR Your next dose is:  Tomorrow Morning 6/13/2017 Dose:  120 mg Take 1 Tab by mouth every morning. Quantity:  30 Tab Refills:  11 LORazepam 0.5 mg tablet Commonly known as:  ATIVAN Your next dose is: Take on as needed schedule Take one tablet three times a day when needed. Quantity:  90 Tab Refills:  5  
     
   
   
   
  
 meclizine 25 mg tablet Commonly known as:  ANTIVERT Your next dose is: Take on as needed schedule Dose:  25 mg Take 1 Tab by mouth three (3) times daily as needed. Indications: VERTIGO Quantity:  30 Tab Refills:  1  
     
   
   
   
  
 metFORMIN 500 mg tablet Commonly known as:  GLUCOPHAGE Your next dose is:  Tomorrow Morning 6/13/2017 Dose:  500 mg Take 1 Tab by mouth daily (with breakfast). Quantity:  30 Tab Refills:  5  
     
  
   
   
   
  
 nitroglycerin 0.4 mg SL tablet Commonly known as:  NITROSTAT Your next dose is: Take on as needed schedule Dose:  0.4 mg  
1 Tab by SubLINGual route every five (5) minutes as needed for Chest Pain. Quantity:  25 Tab Refills:  11  
     
   
   
   
  
 pravastatin 20 mg tablet Commonly known as:  PRAVACHOL Your next dose is:  Tomorrow Morning 6/13/2017 Dose:  20 mg Take 1 Tab by mouth daily. Quantity:  30 Tab Refills:  11  
     
  
   
   
   
  
 traMADol 50 mg tablet Commonly known as:  ULTRAM  
Your last dose was:  8:09 a.m. Dose:  50 mg Take 1 Tab by mouth every six (6) hours as needed for Pain. Max Daily Amount: 200 mg. Quantity:  120 Tab Refills:  5  
     
   
   
   
  
 traZODone 150 mg tablet Commonly known as:  Armando Bynum Your next dose is: Take tonight 6/12/2017 Dose:  150 mg Take 1 Tab by mouth nightly. Quantity:  30 Tab Refills:  5 STOP taking these medications   
 aclidinium bromide 400 mcg/actuation inhaler Commonly known as:  TUDORZA PRESSAIR  
   
  
 budesonide-formoterol 160-4.5 mcg/actuation HFA inhaler Commonly known as:  SYMBICORT OXYGEN-AIR DELIVERY SYSTEMS Where to Get Your Medications Information on where to get these meds will be given to you by the nurse or doctor. ! Ask your nurse or doctor about these medications  
  albuterol 2.5 mg /3 mL (0.083 %) nebulizer solution  
 budesonide 0.5 mg/2 mL Nbsp  
 doxycycline 100 mg capsule  
 guaiFENesin 1,200 mg Ta12 ER tablet  
 predniSONE 10 mg tablet Discharge Instructions Chronic Obstructive Pulmonary Disease (COPD): Care Instructions Your Care Instructions Chronic obstructive pulmonary disease (COPD) is a general term for a group of lung diseases, including emphysema and chronic bronchitis. People with COPD have decreased airflow in and out of the lungs, which makes it hard to breathe. The airways also can get clogged with thick mucus. Cigarette smoking is a major cause of COPD. Although there is no cure for COPD, you can slow its progress. Following your treatment plan and taking care of yourself can help you feel better and live longer. Follow-up care is a key part of your treatment and safety. Be sure to make and go to all appointments, and call your doctor if you are having problems. It's also a good idea to know your test results and keep a list of the medicines you take. How can you care for yourself at home? Staying healthy · Do not smoke. This is the most important step you can take to prevent more damage to your lungs.  If you need help quitting, talk to your doctor about stop-smoking programs and medicines. These can increase your chances of quitting for good. · Avoid colds and flu. Get a pneumococcal vaccine shot. If you have had one before, ask your doctor whether you need a second dose. Get the flu vaccine every fall. If you must be around people with colds or the flu, wash your hands often. · Avoid secondhand smoke, air pollution, and high altitudes. Also avoid cold, dry air and hot, humid air. Stay at home with your windows closed when air pollution is bad. Medicines and oxygen therapy · Take your medicines exactly as prescribed. Call your doctor if you think you are having a problem with your medicine. · You may be taking medicines such as: ¨ Bronchodilators. These help open your airways and make breathing easier. Bronchodilators are either short-acting (work for 6 to 9 hours) or long-acting (work for 24 hours). You inhale most bronchodilators, so they start to act quickly. Always carry your quick-relief inhaler with you in case you need it while you are away from home. ¨ Corticosteroids (prednisone, budesonide). These reduce airway inflammation. They come in pill or inhaled form. You must take these medicines every day for them to work well. · A spacer may help you get more inhaled medicine to your lungs. Ask your doctor or pharmacist if a spacer is right for you. If it is, ask how to use it properly. · Do not take any vitamins, over-the-counter medicine, or herbal products without talking to your doctor first. 
· If your doctor prescribed antibiotics, take them as directed. Do not stop taking them just because you feel better. You need to take the full course of antibiotics. · Oxygen therapy boosts the amount of oxygen in your blood and helps you breathe easier. Use the flow rate your doctor has recommended, and do not change it without talking to your doctor first. 
Activity · Get regular exercise. Walking is an easy way to get exercise.  Start out slowly, and walk a little more each day. · Pay attention to your breathing. You are exercising too hard if you cannot talk while you are exercising. · Take short rest breaks when doing household chores and other activities. · Learn breathing methodssuch as breathing through pursed lipsto help you become less short of breath. · If your doctor has not set you up with a pulmonary rehabilitation program, talk to him or her about whether rehab is right for you. Rehab includes exercise programs, education about your disease and how to manage it, help with diet and other changes, and emotional support. Diet · Eat regular, healthy meals. Use bronchodilators about 1 hour before you eat to make it easier to eat. Eat several small meals instead of three large ones. Drink beverages at the end of the meal. Avoid foods that are hard to chew. · Eat foods that contain protein so that you do not lose muscle mass. Mental health · Talk to your family, friends, or a therapist about your feelings. It is normal to feel frightened, angry, hopeless, helpless, and even guilty. Talking openly about bad feelings can help you cope. If these feelings last, talk to your doctor. When should you call for help? Call 911 anytime you think you may need emergency care. For example, call if: 
· You have severe trouble breathing. Call your doctor now or seek immediate medical care if: 
· You have new or worse trouble breathing. · You cough up blood. · You have a fever. Watch closely for changes in your health, and be sure to contact your doctor if: 
· You cough more deeply or more often, especially if you notice more mucus or a change in the color of your mucus. · You have new or worse swelling in your legs or belly. · You are not getting better as expected. Where can you learn more? Go to http://sahara-perez.info/.  
Enter T268 in the search box to learn more about \"Chronic Obstructive Pulmonary Disease (COPD): Care Instructions. \" Current as of: May 23, 2016 Content Version: 11.2 © 4634-4029 [employer]. Care instructions adapted under license by [employer] (which disclaims liability or warranty for this information). If you have questions about a medical condition or this instruction, always ask your healthcare professional. [address] any warranty or liability for your use of this information. DISCHARGE SUMMARY from Nurse The following personal items are in your possession at time of discharge: 
 
Dental Appliances: None Home Medications: None Jewelry: Bracelet, Ring (ankle and wrist: nose ring) Clothing: Slippers, Undergarments, At bedside, Shirt Other Valuables: Cell Phone, Eyeglasses PATIENT INSTRUCTIONS: 
 
 
F-face looks uneven A-arms unable to move or move unevenly S-speech slurred or non-existent T-time-call 911 as soon as signs and symptoms begin-DO NOT go Back to bed or wait to see if you get better-TIME IS BRAIN. Warning Signs of HEART ATTACK Call 911 if you have these symptoms: 
? Chest discomfort. Most heart attacks involve discomfort in the center of the chest that lasts more than a few minutes, or that goes away and comes back. It can feel like uncomfortable pressure, squeezing, fullness, or pain. ? Discomfort in other areas of the upper body. Symptoms can include pain or discomfort in one or both arms, the back, neck, jaw, or stomach. ? Shortness of breath with or without chest discomfort. ? Other signs may include breaking out in a cold sweat, nausea, or lightheadedness. Don't wait more than five minutes to call [address]! Fast action can save your life.  Calling 911 is almost always the fastest way to get lifesaving treatment. Emergency Medical Services staff can begin treatment when they arrive  up to an hour sooner than if someone gets to the hospital by car. The discharge information has been reviewed with the patient. The patient verbalized understanding. Discharge medications reviewed with the patient and appropriate educational materials and side effects teaching were provided. Discharge Orders None MyChart Announcement We are excited to announce that we are making your provider's discharge notes available to you in [facility]. You will see these notes when they are completed and signed by the physician that discharged you from your recent hospital stay. If you have any questions or concerns about any information you see in [employer], please call the Health Information Department where you were seen or reach out to your Primary Care Provider for more information about your plan of care. General Information Please provide this summary of care documentation to your next provider. Patient Signature:  ____________________________________________________________ Date:  ____________________________________________________________  
  
Shahid George Provider Signature:  ____________________________________________________________ Date:  ____________________________________________________________

## 2017-06-09 PROBLEM — R42 DIZZINESS: Status: RESOLVED | Noted: 2017-05-31 | Resolved: 2017-06-09

## 2017-06-09 PROBLEM — R06.00 DYSPNEA: Status: RESOLVED | Noted: 2017-06-08 | Resolved: 2017-06-09

## 2017-06-09 PROBLEM — D75.1 POLYCYTHEMIA: Status: ACTIVE | Noted: 2017-06-09

## 2017-06-09 LAB
ATRIAL RATE: 119 BPM
CALCULATED P AXIS, ECG09: 73 DEGREES
CALCULATED R AXIS, ECG10: 48 DEGREES
CALCULATED T AXIS, ECG11: 84 DEGREES
DIAGNOSIS, 93000: NORMAL
GLUCOSE BLD STRIP.AUTO-MCNC: 158 MG/DL (ref 65–100)
GLUCOSE BLD STRIP.AUTO-MCNC: 171 MG/DL (ref 65–100)
GLUCOSE BLD STRIP.AUTO-MCNC: 208 MG/DL (ref 65–100)
GLUCOSE BLD STRIP.AUTO-MCNC: 225 MG/DL (ref 65–100)
P-R INTERVAL, ECG05: 130 MS
Q-T INTERVAL, ECG07: 324 MS
QRS DURATION, ECG06: 68 MS
QTC CALCULATION (BEZET), ECG08: 455 MS
VENTRICULAR RATE, ECG03: 119 BPM

## 2017-06-09 PROCEDURE — 87070 CULTURE OTHR SPECIMN AEROBIC: CPT | Performed by: INTERNAL MEDICINE

## 2017-06-09 PROCEDURE — 87077 CULTURE AEROBIC IDENTIFY: CPT | Performed by: INTERNAL MEDICINE

## 2017-06-09 PROCEDURE — 82962 GLUCOSE BLOOD TEST: CPT

## 2017-06-09 PROCEDURE — 74011000250 HC RX REV CODE- 250: Performed by: EMERGENCY MEDICINE

## 2017-06-09 PROCEDURE — 94640 AIRWAY INHALATION TREATMENT: CPT

## 2017-06-09 PROCEDURE — 94760 N-INVAS EAR/PLS OXIMETRY 1: CPT

## 2017-06-09 PROCEDURE — 74011250637 HC RX REV CODE- 250/637: Performed by: INTERNAL MEDICINE

## 2017-06-09 PROCEDURE — 77030012341 HC CHMB SPCR OPTC MDI VYRM -A

## 2017-06-09 PROCEDURE — 77010033678 HC OXYGEN DAILY

## 2017-06-09 PROCEDURE — 99407 BEHAV CHNG SMOKING > 10 MIN: CPT

## 2017-06-09 PROCEDURE — 99232 SBSQ HOSP IP/OBS MODERATE 35: CPT | Performed by: INTERNAL MEDICINE

## 2017-06-09 PROCEDURE — 74011250637 HC RX REV CODE- 250/637: Performed by: NURSE PRACTITIONER

## 2017-06-09 PROCEDURE — 74011250636 HC RX REV CODE- 250/636: Performed by: INTERNAL MEDICINE

## 2017-06-09 PROCEDURE — 65270000029 HC RM PRIVATE

## 2017-06-09 PROCEDURE — 74011000258 HC RX REV CODE- 258: Performed by: EMERGENCY MEDICINE

## 2017-06-09 PROCEDURE — 77030020120 HC VLV RESP PEP HI -B

## 2017-06-09 PROCEDURE — 74011000250 HC RX REV CODE- 250: Performed by: INTERNAL MEDICINE

## 2017-06-09 RX ORDER — METFORMIN HYDROCHLORIDE 500 MG/1
500 TABLET ORAL
Status: DISCONTINUED | OUTPATIENT
Start: 2017-06-09 | End: 2017-06-12 | Stop reason: HOSPADM

## 2017-06-09 RX ORDER — CITALOPRAM 10 MG/1
10 TABLET ORAL
COMMUNITY
End: 2017-07-11

## 2017-06-09 RX ORDER — CITALOPRAM 20 MG/1
10 TABLET, FILM COATED ORAL DAILY
Status: DISCONTINUED | OUTPATIENT
Start: 2017-06-09 | End: 2017-06-12 | Stop reason: HOSPADM

## 2017-06-09 RX ADMIN — TRAMADOL HYDROCHLORIDE 50 MG: 50 TABLET, FILM COATED ORAL at 08:18

## 2017-06-09 RX ADMIN — BENZONATATE 100 MG: 100 CAPSULE ORAL at 15:00

## 2017-06-09 RX ADMIN — ISOSORBIDE MONONITRATE 120 MG: 60 TABLET, EXTENDED RELEASE ORAL at 05:05

## 2017-06-09 RX ADMIN — IPRATROPIUM BROMIDE AND ALBUTEROL SULFATE 3 ML: 2.5; .5 SOLUTION RESPIRATORY (INHALATION) at 04:50

## 2017-06-09 RX ADMIN — BENZONATATE 100 MG: 100 CAPSULE ORAL at 05:07

## 2017-06-09 RX ADMIN — TRAMADOL HYDROCHLORIDE 50 MG: 50 TABLET, FILM COATED ORAL at 00:16

## 2017-06-09 RX ADMIN — IPRATROPIUM BROMIDE AND ALBUTEROL SULFATE 3 ML: 2.5; .5 SOLUTION RESPIRATORY (INHALATION) at 19:24

## 2017-06-09 RX ADMIN — Medication 10 ML: at 04:59

## 2017-06-09 RX ADMIN — IPRATROPIUM BROMIDE AND ALBUTEROL SULFATE 3 ML: 2.5; .5 SOLUTION RESPIRATORY (INHALATION) at 09:20

## 2017-06-09 RX ADMIN — METHYLPREDNISOLONE SODIUM SUCCINATE 40 MG: 40 INJECTION, POWDER, FOR SOLUTION INTRAMUSCULAR; INTRAVENOUS at 22:31

## 2017-06-09 RX ADMIN — BUDESONIDE 500 MCG: 0.5 SUSPENSION RESPIRATORY (INHALATION) at 19:24

## 2017-06-09 RX ADMIN — IPRATROPIUM BROMIDE AND ALBUTEROL SULFATE 3 ML: 2.5; .5 SOLUTION RESPIRATORY (INHALATION) at 00:23

## 2017-06-09 RX ADMIN — METFORMIN HYDROCHLORIDE 500 MG: 500 TABLET, FILM COATED ORAL at 10:09

## 2017-06-09 RX ADMIN — LORAZEPAM 0.5 MG: 0.5 TABLET ORAL at 22:31

## 2017-06-09 RX ADMIN — HYDROCODONE BITARTRATE AND HOMATROPINE METHYLBROMIDE 5 ML: 5; 1.5 SOLUTION ORAL at 15:00

## 2017-06-09 RX ADMIN — CITALOPRAM HYDROBROMIDE 10 MG: 20 TABLET ORAL at 10:09

## 2017-06-09 RX ADMIN — IPRATROPIUM BROMIDE AND ALBUTEROL SULFATE 3 ML: 2.5; .5 SOLUTION RESPIRATORY (INHALATION) at 11:57

## 2017-06-09 RX ADMIN — TRAMADOL HYDROCHLORIDE 50 MG: 50 TABLET, FILM COATED ORAL at 15:00

## 2017-06-09 RX ADMIN — IPRATROPIUM BROMIDE AND ALBUTEROL SULFATE 3 ML: 2.5; .5 SOLUTION RESPIRATORY (INHALATION) at 16:41

## 2017-06-09 RX ADMIN — ASPIRIN 81 MG: 81 TABLET, COATED ORAL at 08:11

## 2017-06-09 RX ADMIN — BUDESONIDE 500 MCG: 0.5 SUSPENSION RESPIRATORY (INHALATION) at 09:20

## 2017-06-09 RX ADMIN — PRAVASTATIN SODIUM 20 MG: 20 TABLET ORAL at 08:11

## 2017-06-09 RX ADMIN — METHYLPREDNISOLONE SODIUM SUCCINATE 40 MG: 40 INJECTION, POWDER, FOR SOLUTION INTRAMUSCULAR; INTRAVENOUS at 08:11

## 2017-06-09 RX ADMIN — DOXYCYCLINE 100 MG: 100 INJECTION, POWDER, LYOPHILIZED, FOR SOLUTION INTRAVENOUS at 05:00

## 2017-06-09 RX ADMIN — TRAMADOL HYDROCHLORIDE 50 MG: 50 TABLET, FILM COATED ORAL at 22:31

## 2017-06-09 RX ADMIN — HYDROCODONE BITARTRATE AND HOMATROPINE METHYLBROMIDE 5 ML: 5; 1.5 SOLUTION ORAL at 10:10

## 2017-06-09 RX ADMIN — ENOXAPARIN SODIUM 40 MG: 40 INJECTION SUBCUTANEOUS at 20:39

## 2017-06-09 RX ADMIN — HYDROCODONE BITARTRATE AND HOMATROPINE METHYLBROMIDE 5 ML: 5; 1.5 SOLUTION ORAL at 06:30

## 2017-06-09 RX ADMIN — Medication 10 ML: at 13:02

## 2017-06-09 RX ADMIN — LORAZEPAM 0.5 MG: 0.5 TABLET ORAL at 00:17

## 2017-06-09 RX ADMIN — DOXYCYCLINE 100 MG: 100 INJECTION, POWDER, LYOPHILIZED, FOR SOLUTION INTRAVENOUS at 15:00

## 2017-06-09 RX ADMIN — Medication 5 ML: at 22:35

## 2017-06-09 NOTE — PROGRESS NOTES
Dual skin assessment with Ezra Manzano RN. Patient has extremities tattoos and piercing. Low back scar. Medication side effects fact sheet provided and reviewed with patient/family.

## 2017-06-09 NOTE — PROGRESS NOTES
Cesar Funes  Admission Date: 6/8/2017             Daily Progress Note: 6/9/2017    The patient's chart is reviewed and the patient is discussed with the staff. Admitted with COPD exacerbation. Still smoking. Wears oxygen just with sleep at home. Subjective:     Still coughing a lot but can at least walk to the bathroom now. Still short of breath with any exertion.      Current Facility-Administered Medications   Medication Dose Route Frequency    amLODIPine (NORVASC) tablet 5 mg  5 mg Oral DAILY    aspirin delayed-release tablet 81 mg  81 mg Oral DAILY    atenolol (TENORMIN) tablet 25 mg  25 mg Oral DAILY    isosorbide mononitrate ER (IMDUR) tablet 120 mg  120 mg Oral 7am    LORazepam (ATIVAN) tablet 0.5 mg  0.5 mg Oral BID PRN    pravastatin (PRAVACHOL) tablet 20 mg  20 mg Oral DAILY    traMADol (ULTRAM) tablet 50 mg  50 mg Oral Q6H PRN    sodium chloride (NS) flush 5-10 mL  5-10 mL IntraVENous Q8H    sodium chloride (NS) flush 5-10 mL  5-10 mL IntraVENous PRN    enoxaparin (LOVENOX) injection 40 mg  40 mg SubCUTAneous Q24H    albuterol-ipratropium (DUO-NEB) 2.5 MG-0.5 MG/3 ML  3 mL Nebulization Q4H RT    budesonide (PULMICORT) 500 mcg/2 ml nebulizer suspension  500 mcg Nebulization BID RT    methylPREDNISolone (PF) (SOLU-MEDROL) injection 40 mg  40 mg IntraVENous Q12H    benzonatate (TESSALON) capsule 100 mg  100 mg Oral TID PRN    HYDROcodone-homatropine (HYCODAN) 5-1.5 mg/5 mL (5 mL) syrup 5 mL  5 mL Oral Q4H PRN    doxycycline (VIBRAMYCIN) 100 mg in 0.9% sodium chloride (MBP/ADV) 100 mL  100 mg IntraVENous Q12H    nicotine (NICODERM CQ) 14 mg/24 hr patch 1 Patch  1 Patch TransDERmal DAILY         Objective:     Vitals:    06/09/17 0451 06/09/17 0736 06/09/17 0815 06/09/17 0921   BP:  109/56 98/56    Pulse:  (!) 104 98    Resp:  17     Temp:  97.4 °F (36.3 °C)     SpO2: 97% 93%  94%   Weight:       Height:         Intake and Output:           Physical Exam: Constitutional:  the patient is well developed and in no acute distress  HEENT:  Sclera clear, pupils equal, oral mucosa moist  Lungs: clear to ausculation but coughing with exam. Currently on 3 liter cannula. Cardiovascular:  RRR without M,G,R  Abd/GI: soft and non-tender; with positive bowel sounds. Ext: warm without cyanosis. There is no lower leg edema. Musculoskeletal: moves all four extremities with equal strength  Skin:  no jaundice or rashes, no wounds   Neuro: no gross neuro deficits   Musculoskeletal: can ambulate. No deformity  Psychiatric: Calm. ROS: chronic dyspnea, good appetite, chronic anxiety  Lines: peripheral IV    CHEST XRAY:       LAB  Recent Labs      06/08/17   1240   WBC  13.2*   HGB  16.5*   HCT  47.2*   PLT  285     Recent Labs      06/08/17   1535  06/08/17   1240   NA   --   138   K  3.8  5.5*   CL   --   104   CO2   --   23   GLU   --   115*   BUN   --   8   CREA   --   0.76   ALB   --   3.4*   SGOT   --   51*       Assessment:  (Medical Decision Making)     Hospital Problems  Date Reviewed: 6/8/2017          Codes Class Noted POA    Essential hypertension (Chronic) ICD-10-CM: I10  ICD-9-CM: 401.9  Unknown Yes    On home med but blood pressure low    Diabetes (HCC) (Chronic) ICD-10-CM: E11.9  ICD-9-CM: 250.00  Unknown Yes     with steroids - on glucophage at home    Polycythemia [facility]) ICD-10-CM: D75.1  ICD-9-CM: 238.4  6/9/2017 Yes    Uses oxygen just with sleep at home - currently dependent    Tobacco use (Chronic) ICD-10-CM: Z72.0  ICD-9-CM: 305.1  6/8/2017 Yes    Ongoing. Using nicoderm here    COPD exacerbation ([facility] [address] ICD-10-CM: J44.1  ICD-9-CM: 491.21  6/8/2017 Yes    Still coughing a lot and short of breath with any exertion    Tracheobronchitis ICD-10-CM: J40  ICD-9-CM: 490  6/8/2017 Yes    On doxycycline    Hypoxemia ICD-10-CM: R09.02  ICD-9-CM: 799.02  6/8/2017 Yes    As above          Plan:  (Medical Decision Making)   1.  Continue IV steroids - 40 mg BID for now  2. duoneb every 4 hours, pulmicort BID  3. Using nicoderm here - still smoking at home  4. Assess oxygen needs at discharge. Noted polycythemia - may need continuous therapy - was just using with sleep before  5. Day 2 doxycycline  6. Restart Glucophage from home for DM  7. Hold norvasc prn - blood pressure a little low    Caio Curry NP    More than 50% of time documented was spent face-to-face contact with the patient and in the care of the patient on the floor/unit where the patient is located. The patient has been seen and examined by me personally, the chart,labs, and radiographic studies have been reviewed. Chest:CTA at time of my examination   Extremities: no edema    I agree with the above assessment and plan.     Nilson Phelan MD.

## 2017-06-09 NOTE — ROUTINE PROCESS
TRANSFER - IN REPORT:    Verbal report received from Marichuy(name) on Bar Salazar  being received from ED(unit) for ordered procedure      Report consisted of patients Situation, Background, Assessment and   Recommendations(SBAR). Information from the following report(s) ED Summary was reviewed with the receiving nurse. Opportunity for questions and clarification was provided. Assessment completed upon patients arrival to unit and care assumed.

## 2017-06-09 NOTE — ED NOTES
TRANSFER - OUT REPORT:    Verbal report given to Carl Cook RN on Joe Vazquez  being transferred to [facility] for routine progression of care       Report consisted of patients Situation, Background, Assessment and   Recommendations(SBAR). Information from the following report(s) SBAR, ED Summary, [facility] and Recent Results was reviewed with the receiving nurse. Lines:   Peripheral IV 06/08/17 Left Antecubital (Active)   Site Assessment Clean, dry, & intact 6/8/2017  8:14 PM   Phlebitis Assessment 0 6/8/2017  8:14 PM   Infiltration Assessment 0 6/8/2017  8:14 PM   Dressing Status Clean, dry, & intact 6/8/2017  8:14 PM   Hub Color/Line Status Pink 6/8/2017  8:14 PM        Opportunity for questions and clarification was provided.       Patient transported with:   O2 @ 3 liters  Tech

## 2017-06-10 LAB
GLUCOSE BLD STRIP.AUTO-MCNC: 148 MG/DL (ref 65–100)
GLUCOSE BLD STRIP.AUTO-MCNC: 230 MG/DL (ref 65–100)

## 2017-06-10 PROCEDURE — 77010033678 HC OXYGEN DAILY

## 2017-06-10 PROCEDURE — 94760 N-INVAS EAR/PLS OXIMETRY 1: CPT

## 2017-06-10 PROCEDURE — 99232 SBSQ HOSP IP/OBS MODERATE 35: CPT | Performed by: INTERNAL MEDICINE

## 2017-06-10 PROCEDURE — 74011000258 HC RX REV CODE- 258: Performed by: EMERGENCY MEDICINE

## 2017-06-10 PROCEDURE — 65270000029 HC RM PRIVATE

## 2017-06-10 PROCEDURE — 74011000250 HC RX REV CODE- 250: Performed by: INTERNAL MEDICINE

## 2017-06-10 PROCEDURE — 74011250637 HC RX REV CODE- 250/637: Performed by: INTERNAL MEDICINE

## 2017-06-10 PROCEDURE — 74011250637 HC RX REV CODE- 250/637: Performed by: NURSE PRACTITIONER

## 2017-06-10 PROCEDURE — 77030027138 HC INCENT SPIROMETER -A

## 2017-06-10 PROCEDURE — 82962 GLUCOSE BLOOD TEST: CPT

## 2017-06-10 PROCEDURE — 74011636637 HC RX REV CODE- 636/637: Performed by: INTERNAL MEDICINE

## 2017-06-10 PROCEDURE — 74011000250 HC RX REV CODE- 250: Performed by: EMERGENCY MEDICINE

## 2017-06-10 PROCEDURE — 74011250636 HC RX REV CODE- 250/636: Performed by: INTERNAL MEDICINE

## 2017-06-10 PROCEDURE — 94640 AIRWAY INHALATION TREATMENT: CPT

## 2017-06-10 RX ORDER — DOCUSATE SODIUM 100 MG/1
100 CAPSULE, LIQUID FILLED ORAL
Status: DISCONTINUED | OUTPATIENT
Start: 2017-06-10 | End: 2017-06-12 | Stop reason: HOSPADM

## 2017-06-10 RX ORDER — ADHESIVE BANDAGE
30 BANDAGE TOPICAL DAILY PRN
Status: DISCONTINUED | OUTPATIENT
Start: 2017-06-10 | End: 2017-06-12 | Stop reason: HOSPADM

## 2017-06-10 RX ADMIN — AMLODIPINE BESYLATE 5 MG: 5 TABLET ORAL at 10:27

## 2017-06-10 RX ADMIN — CITALOPRAM HYDROBROMIDE 10 MG: 20 TABLET ORAL at 10:27

## 2017-06-10 RX ADMIN — ASPIRIN 81 MG: 81 TABLET, COATED ORAL at 10:27

## 2017-06-10 RX ADMIN — METFORMIN HYDROCHLORIDE 500 MG: 500 TABLET, FILM COATED ORAL at 10:27

## 2017-06-10 RX ADMIN — METHYLPREDNISOLONE SODIUM SUCCINATE 40 MG: 40 INJECTION, POWDER, FOR SOLUTION INTRAMUSCULAR; INTRAVENOUS at 22:05

## 2017-06-10 RX ADMIN — MAGNESIUM HYDROXIDE 30 ML: 400 SUSPENSION ORAL at 14:27

## 2017-06-10 RX ADMIN — IPRATROPIUM BROMIDE AND ALBUTEROL SULFATE 3 ML: 2.5; .5 SOLUTION RESPIRATORY (INHALATION) at 04:49

## 2017-06-10 RX ADMIN — IPRATROPIUM BROMIDE AND ALBUTEROL SULFATE 3 ML: 2.5; .5 SOLUTION RESPIRATORY (INHALATION) at 07:27

## 2017-06-10 RX ADMIN — IPRATROPIUM BROMIDE AND ALBUTEROL SULFATE 3 ML: 2.5; .5 SOLUTION RESPIRATORY (INHALATION) at 20:06

## 2017-06-10 RX ADMIN — HYDROCODONE BITARTRATE AND HOMATROPINE METHYLBROMIDE 5 ML: 5; 1.5 SOLUTION ORAL at 10:34

## 2017-06-10 RX ADMIN — HYDROCODONE BITARTRATE AND HOMATROPINE METHYLBROMIDE 5 ML: 5; 1.5 SOLUTION ORAL at 18:22

## 2017-06-10 RX ADMIN — IPRATROPIUM BROMIDE AND ALBUTEROL SULFATE 3 ML: 2.5; .5 SOLUTION RESPIRATORY (INHALATION) at 00:13

## 2017-06-10 RX ADMIN — LORAZEPAM 0.5 MG: 0.5 TABLET ORAL at 23:46

## 2017-06-10 RX ADMIN — HYDROCODONE BITARTRATE AND HOMATROPINE METHYLBROMIDE 5 ML: 5; 1.5 SOLUTION ORAL at 22:11

## 2017-06-10 RX ADMIN — IPRATROPIUM BROMIDE AND ALBUTEROL SULFATE 3 ML: 2.5; .5 SOLUTION RESPIRATORY (INHALATION) at 11:18

## 2017-06-10 RX ADMIN — Medication 10 ML: at 22:06

## 2017-06-10 RX ADMIN — ENOXAPARIN SODIUM 40 MG: 40 INJECTION SUBCUTANEOUS at 18:18

## 2017-06-10 RX ADMIN — ISOSORBIDE MONONITRATE 120 MG: 60 TABLET, EXTENDED RELEASE ORAL at 05:14

## 2017-06-10 RX ADMIN — Medication 10 ML: at 05:00

## 2017-06-10 RX ADMIN — PRAVASTATIN SODIUM 20 MG: 20 TABLET ORAL at 10:26

## 2017-06-10 RX ADMIN — ATENOLOL 25 MG: 25 TABLET ORAL at 10:26

## 2017-06-10 RX ADMIN — HYDROCODONE BITARTRATE AND HOMATROPINE METHYLBROMIDE 5 ML: 5; 1.5 SOLUTION ORAL at 05:04

## 2017-06-10 RX ADMIN — IPRATROPIUM BROMIDE AND ALBUTEROL SULFATE 3 ML: 2.5; .5 SOLUTION RESPIRATORY (INHALATION) at 15:10

## 2017-06-10 RX ADMIN — BUDESONIDE 500 MCG: 0.5 SUSPENSION RESPIRATORY (INHALATION) at 07:28

## 2017-06-10 RX ADMIN — BUDESONIDE 500 MCG: 0.5 SUSPENSION RESPIRATORY (INHALATION) at 20:06

## 2017-06-10 RX ADMIN — TRAMADOL HYDROCHLORIDE 50 MG: 50 TABLET, FILM COATED ORAL at 23:46

## 2017-06-10 RX ADMIN — DOXYCYCLINE 100 MG: 100 INJECTION, POWDER, LYOPHILIZED, FOR SOLUTION INTRAVENOUS at 04:56

## 2017-06-10 RX ADMIN — METHYLPREDNISOLONE SODIUM SUCCINATE 40 MG: 40 INJECTION, POWDER, FOR SOLUTION INTRAMUSCULAR; INTRAVENOUS at 10:27

## 2017-06-10 RX ADMIN — INSULIN HUMAN 4 UNITS: 100 INJECTION, SOLUTION PARENTERAL at 18:12

## 2017-06-10 NOTE — PROGRESS NOTES
Karina Caceres  Admission Date: 6/8/2017             Daily Progress Note: 6/10/2017    The patient's chart is reviewed and the patient is discussed with the staff. Admitted with COPD exacerbation. Still smoking. Wears oxygen just with sleep at home. Subjective:   Patient coughing up secretions. Still feels weak. Moist and strong cough. Wants stool softeners.  Also has DM and does not have equipment to check sugars, etc.     Current Facility-Administered Medications   Medication Dose Route Frequency    metFORMIN (GLUCOPHAGE) tablet 500 mg  500 mg Oral DAILY WITH BREAKFAST    citalopram (CELEXA) tablet 10 mg  10 mg Oral DAILY    amLODIPine (NORVASC) tablet 5 mg  5 mg Oral DAILY    aspirin delayed-release tablet 81 mg  81 mg Oral DAILY    atenolol (TENORMIN) tablet 25 mg  25 mg Oral DAILY    isosorbide mononitrate ER (IMDUR) tablet 120 mg  120 mg Oral 7am    LORazepam (ATIVAN) tablet 0.5 mg  0.5 mg Oral BID PRN    pravastatin (PRAVACHOL) tablet 20 mg  20 mg Oral DAILY    traMADol (ULTRAM) tablet 50 mg  50 mg Oral Q6H PRN    sodium chloride (NS) flush 5-10 mL  5-10 mL IntraVENous Q8H    sodium chloride (NS) flush 5-10 mL  5-10 mL IntraVENous PRN    enoxaparin (LOVENOX) injection 40 mg  40 mg SubCUTAneous Q24H    albuterol-ipratropium (DUO-NEB) 2.5 MG-0.5 MG/3 ML  3 mL Nebulization Q4H RT    budesonide (PULMICORT) 500 mcg/2 ml nebulizer suspension  500 mcg Nebulization BID RT    methylPREDNISolone (PF) (SOLU-MEDROL) injection 40 mg  40 mg IntraVENous Q12H    benzonatate (TESSALON) capsule 100 mg  100 mg Oral TID PRN    HYDROcodone-homatropine (HYCODAN) 5-1.5 mg/5 mL (5 mL) syrup 5 mL  5 mL Oral Q4H PRN    doxycycline (VIBRAMYCIN) 100 mg in 0.9% sodium chloride (MBP/ADV) 100 mL  100 mg IntraVENous Q12H    nicotine (NICODERM CQ) 14 mg/24 hr patch 1 Patch  1 Patch TransDERmal DAILY         Objective:     Vitals:    06/10/17 0719 06/10/17 0730 06/10/17 1032 06/10/17 1119   BP: 98/51 118/62    Pulse: (!) 105  94    Resp: 20  20    Temp: 97.4 °F (36.3 °C)  97.2 °F (36.2 °C)    SpO2: 96% 99% 94% 95%   Weight:       Height:         Intake and Output:           Physical Exam:   Constitutional:  the patient is well developed and in no acute distress  HEENT:  Sclera clear, pupils equal, oral mucosa moist  Lungs: clear to ausculation but coughing with exam.moist cough. Currently on 3 liter cannula. Cardiovascular:  RRR without M,G,R  Abd/GI: soft and non-tender; with positive bowel sounds. Ext: warm without cyanosis. There is no lower leg edema. Musculoskeletal: moves all four extremities with equal strength  Skin:  no jaundice or rashes, no wounds   Neuro: no gross neuro deficits   Musculoskeletal: can ambulate. No deformity  Psychiatric: Calm. ROS: chronic dyspnea, good appetite, chronic anxiety  Lines: peripheral IV    CHEST XRAY:       LAB  Recent Labs      06/08/17   1240   WBC  13.2*   HGB  16.5*   HCT  47.2*   PLT  285     Recent Labs      06/08/17   1535  06/08/17   1240   NA   --   138   K  3.8  5.5*   CL   --   104   CO2   --   23   GLU   --   115*   BUN   --   8   CREA   --   0.76   ALB   --   3.4*   SGOT   --   51*       Assessment:  (Medical Decision Making)     Hospital Problems  Date Reviewed: 6/8/2017          Codes Class Noted POA    Essential hypertension (Chronic) ICD-10-CM: I10  ICD-9-CM: 401.9  Unknown Yes    On home med but blood pressure low    Diabetes (HCC) (Chronic) ICD-10-CM: E11.9  ICD-9-CM: 250.00  Unknown Yes    BS up from with steroids - on glucophage at home. Will get diabetic teaching and may need glucomter, etc.     Polycythemia ([facility] [address] ICD-10-CM: D75.1  ICD-9-CM: 238.4  6/9/2017 Yes    Uses oxygen just with sleep at home - currently dependent    Tobacco use (Chronic) ICD-10-CM: Z72.0  ICD-9-CM: 305.1  6/8/2017 Yes    Ongoing.  Using nicoderm here    COPD exacerbation ([facility] [address] ICD-10-CM: J44.1  ICD-9-CM: 491.21  6/8/2017 Yes    Still coughing a lot and short of breath with any exertion    Tracheobronchitis ICD-10-CM: J40  ICD-9-CM: 484  6/8/2017 Yes    On doxycycline    Hypoxemia ICD-10-CM: R09.02  ICD-9-CM: 799.02  6/8/2017 Yes    As above          Plan:  (Medical Decision Making)     --. Continue IV steroids - 40 mg BID do not taper today  - duoneb every 4 hours, pulmicort BID and flutter valve is helping.   --Using nicoderm here - still smoking at home  --check oxygen with exertion on discharge day. --Day 3 doxycycline  --Diabetic teaching, check surgars  --continue remaining treatment. Andres Cho MD    More than 50% of time documented was spent face-to-face contact with the patient and in the care of the patient on the floor/unit where the patient is located. The patient has been seen and examined by me personally, the chart,labs, and radiographic studies have been reviewed. Chest:CTA at time of my examination   Extremities: no edema    I agree with the above assessment and plan.     Rose George MD.

## 2017-06-11 LAB
GLUCOSE BLD STRIP.AUTO-MCNC: 139 MG/DL (ref 65–100)
GLUCOSE BLD STRIP.AUTO-MCNC: 144 MG/DL (ref 65–100)
GLUCOSE BLD STRIP.AUTO-MCNC: 182 MG/DL (ref 65–100)
GLUCOSE BLD STRIP.AUTO-MCNC: 194 MG/DL (ref 65–100)

## 2017-06-11 PROCEDURE — 74011000250 HC RX REV CODE- 250: Performed by: INTERNAL MEDICINE

## 2017-06-11 PROCEDURE — 65270000029 HC RM PRIVATE

## 2017-06-11 PROCEDURE — 74011250637 HC RX REV CODE- 250/637: Performed by: NURSE PRACTITIONER

## 2017-06-11 PROCEDURE — 99232 SBSQ HOSP IP/OBS MODERATE 35: CPT | Performed by: INTERNAL MEDICINE

## 2017-06-11 PROCEDURE — 74011250637 HC RX REV CODE- 250/637: Performed by: INTERNAL MEDICINE

## 2017-06-11 PROCEDURE — 77010033678 HC OXYGEN DAILY

## 2017-06-11 PROCEDURE — 74011000258 HC RX REV CODE- 258: Performed by: EMERGENCY MEDICINE

## 2017-06-11 PROCEDURE — 74011000250 HC RX REV CODE- 250: Performed by: EMERGENCY MEDICINE

## 2017-06-11 PROCEDURE — 94640 AIRWAY INHALATION TREATMENT: CPT

## 2017-06-11 PROCEDURE — 82962 GLUCOSE BLOOD TEST: CPT

## 2017-06-11 PROCEDURE — 74011636637 HC RX REV CODE- 636/637: Performed by: INTERNAL MEDICINE

## 2017-06-11 PROCEDURE — 94762 N-INVAS EAR/PLS OXIMTRY CONT: CPT

## 2017-06-11 PROCEDURE — 74011250636 HC RX REV CODE- 250/636: Performed by: INTERNAL MEDICINE

## 2017-06-11 PROCEDURE — 94760 N-INVAS EAR/PLS OXIMETRY 1: CPT

## 2017-06-11 RX ADMIN — HYDROCODONE BITARTRATE AND HOMATROPINE METHYLBROMIDE 5 ML: 5; 1.5 SOLUTION ORAL at 06:10

## 2017-06-11 RX ADMIN — HYDROCODONE BITARTRATE AND HOMATROPINE METHYLBROMIDE 5 ML: 5; 1.5 SOLUTION ORAL at 16:27

## 2017-06-11 RX ADMIN — ATENOLOL 25 MG: 25 TABLET ORAL at 08:07

## 2017-06-11 RX ADMIN — ISOSORBIDE MONONITRATE 120 MG: 60 TABLET, EXTENDED RELEASE ORAL at 06:10

## 2017-06-11 RX ADMIN — BUDESONIDE 500 MCG: 0.5 SUSPENSION RESPIRATORY (INHALATION) at 09:05

## 2017-06-11 RX ADMIN — PRAVASTATIN SODIUM 20 MG: 20 TABLET ORAL at 08:07

## 2017-06-11 RX ADMIN — ASPIRIN 81 MG: 81 TABLET, COATED ORAL at 08:07

## 2017-06-11 RX ADMIN — TRAMADOL HYDROCHLORIDE 50 MG: 50 TABLET, FILM COATED ORAL at 22:22

## 2017-06-11 RX ADMIN — LORAZEPAM 0.5 MG: 0.5 TABLET ORAL at 14:08

## 2017-06-11 RX ADMIN — DOXYCYCLINE 100 MG: 100 INJECTION, POWDER, LYOPHILIZED, FOR SOLUTION INTRAVENOUS at 16:21

## 2017-06-11 RX ADMIN — IPRATROPIUM BROMIDE AND ALBUTEROL SULFATE 3 ML: 2.5; .5 SOLUTION RESPIRATORY (INHALATION) at 00:00

## 2017-06-11 RX ADMIN — HYDROCODONE BITARTRATE AND HOMATROPINE METHYLBROMIDE 5 ML: 5; 1.5 SOLUTION ORAL at 02:39

## 2017-06-11 RX ADMIN — IPRATROPIUM BROMIDE AND ALBUTEROL SULFATE 3 ML: 2.5; .5 SOLUTION RESPIRATORY (INHALATION) at 12:20

## 2017-06-11 RX ADMIN — INSULIN HUMAN 2 UNITS: 100 INJECTION, SOLUTION PARENTERAL at 08:06

## 2017-06-11 RX ADMIN — IPRATROPIUM BROMIDE AND ALBUTEROL SULFATE 3 ML: 2.5; .5 SOLUTION RESPIRATORY (INHALATION) at 15:31

## 2017-06-11 RX ADMIN — METHYLPREDNISOLONE SODIUM SUCCINATE 40 MG: 40 INJECTION, POWDER, FOR SOLUTION INTRAMUSCULAR; INTRAVENOUS at 08:08

## 2017-06-11 RX ADMIN — Medication 5 ML: at 06:11

## 2017-06-11 RX ADMIN — INSULIN HUMAN 2 UNITS: 100 INJECTION, SOLUTION PARENTERAL at 11:38

## 2017-06-11 RX ADMIN — BUDESONIDE 500 MCG: 0.5 SUSPENSION RESPIRATORY (INHALATION) at 20:00

## 2017-06-11 RX ADMIN — CITALOPRAM HYDROBROMIDE 10 MG: 20 TABLET ORAL at 08:08

## 2017-06-11 RX ADMIN — BENZONATATE 100 MG: 100 CAPSULE ORAL at 22:22

## 2017-06-11 RX ADMIN — IPRATROPIUM BROMIDE AND ALBUTEROL SULFATE 3 ML: 2.5; .5 SOLUTION RESPIRATORY (INHALATION) at 23:13

## 2017-06-11 RX ADMIN — METHYLPREDNISOLONE SODIUM SUCCINATE 40 MG: 40 INJECTION, POWDER, FOR SOLUTION INTRAMUSCULAR; INTRAVENOUS at 22:09

## 2017-06-11 RX ADMIN — ENOXAPARIN SODIUM 40 MG: 40 INJECTION SUBCUTANEOUS at 22:11

## 2017-06-11 RX ADMIN — DOXYCYCLINE 100 MG: 100 INJECTION, POWDER, LYOPHILIZED, FOR SOLUTION INTRAVENOUS at 04:15

## 2017-06-11 RX ADMIN — IPRATROPIUM BROMIDE AND ALBUTEROL SULFATE 3 ML: 2.5; .5 SOLUTION RESPIRATORY (INHALATION) at 09:05

## 2017-06-11 RX ADMIN — IPRATROPIUM BROMIDE AND ALBUTEROL SULFATE 3 ML: 2.5; .5 SOLUTION RESPIRATORY (INHALATION) at 20:14

## 2017-06-11 RX ADMIN — DOCUSATE SODIUM 100 MG: 100 CAPSULE, LIQUID FILLED ORAL at 02:39

## 2017-06-11 RX ADMIN — METFORMIN HYDROCHLORIDE 500 MG: 500 TABLET, FILM COATED ORAL at 08:07

## 2017-06-11 RX ADMIN — AMLODIPINE BESYLATE 5 MG: 5 TABLET ORAL at 08:08

## 2017-06-11 RX ADMIN — IPRATROPIUM BROMIDE AND ALBUTEROL SULFATE 3 ML: 2.5; .5 SOLUTION RESPIRATORY (INHALATION) at 03:52

## 2017-06-11 NOTE — PROGRESS NOTES
Maddie Berry  Admission Date: 6/8/2017             Daily Progress Note: 6/11/2017    The patient's chart is reviewed and the patient is discussed with the staff. Admitted with COPD exacerbation. Still smoking. Wears oxygen just with sleep at home. Subjective:   Patient coughing up secretions. Still feels weak. Moist and strong cough. Wants stool softeners.  Also has DM and does not have equipment to check sugars, etc.     Current Facility-Administered Medications   Medication Dose Route Frequency    docusate sodium (COLACE) capsule 100 mg  100 mg Oral BID PRN    magnesium hydroxide (MILK OF MAGNESIA) 400 mg/5 mL oral suspension 30 mL  30 mL Oral DAILY PRN    insulin regular (NOVOLIN R, HUMULIN R) injection   SubCUTAneous AC&HS    metFORMIN (GLUCOPHAGE) tablet 500 mg  500 mg Oral DAILY WITH BREAKFAST    citalopram (CELEXA) tablet 10 mg  10 mg Oral DAILY    amLODIPine (NORVASC) tablet 5 mg  5 mg Oral DAILY    aspirin delayed-release tablet 81 mg  81 mg Oral DAILY    atenolol (TENORMIN) tablet 25 mg  25 mg Oral DAILY    isosorbide mononitrate ER (IMDUR) tablet 120 mg  120 mg Oral 7am    LORazepam (ATIVAN) tablet 0.5 mg  0.5 mg Oral BID PRN    pravastatin (PRAVACHOL) tablet 20 mg  20 mg Oral DAILY    traMADol (ULTRAM) tablet 50 mg  50 mg Oral Q6H PRN    sodium chloride (NS) flush 5-10 mL  5-10 mL IntraVENous Q8H    sodium chloride (NS) flush 5-10 mL  5-10 mL IntraVENous PRN    enoxaparin (LOVENOX) injection 40 mg  40 mg SubCUTAneous Q24H    albuterol-ipratropium (DUO-NEB) 2.5 MG-0.5 MG/3 ML  3 mL Nebulization Q4H RT    budesonide (PULMICORT) 500 mcg/2 ml nebulizer suspension  500 mcg Nebulization BID RT    methylPREDNISolone (PF) (SOLU-MEDROL) injection 40 mg  40 mg IntraVENous Q12H    benzonatate (TESSALON) capsule 100 mg  100 mg Oral TID PRN    HYDROcodone-homatropine (HYCODAN) 5-1.5 mg/5 mL (5 mL) syrup 5 mL  5 mL Oral Q4H PRN    doxycycline (VIBRAMYCIN) 100 mg in 0.9% sodium chloride (MBP/ADV) 100 mL  100 mg IntraVENous Q12H    nicotine (NICODERM CQ) 14 mg/24 hr patch 1 Patch  1 Patch TransDERmal DAILY         Objective:     Vitals:    06/11/17 0906 06/11/17 1103 06/11/17 1221 06/11/17 1451   BP:  119/61  121/60   Pulse:  (!) 104  96   Resp:  18  19   Temp:  97.9 °F (36.6 °C)  97.7 °F (36.5 °C)   SpO2: 92% 97% 94% 96%   Weight:       Height:         Intake and Output:      06/11 0701 - 06/11 1900  In: 200 [P.O.:200]  Out: -     Physical Exam:   Constitutional:  the patient is well developed and in no acute distress  HEENT:  Sclera clear, pupils equal, oral mucosa moist  Lungs: clear to ausculation but coughing with exam.moist cough. Currently on 3 liter cannula. Cardiovascular:  RRR without M,G,R  Abd/GI: soft and non-tender; with positive bowel sounds. Ext: warm without cyanosis. There is no lower leg edema. Musculoskeletal: moves all four extremities with equal strength  Skin:  no jaundice or rashes, no wounds   Neuro: no gross neuro deficits   Musculoskeletal: can ambulate. No deformity  Psychiatric: Calm. ROS: chronic dyspnea, good appetite, chronic anxiety  Lines: peripheral IV    CHEST XRAY:       LAB  No results for input(s): WBC, HGB, HCT, PLT, INR, HGBEXT, HCTEXT, PLTEXT, HGBEXT, HCTEXT, PLTEXT in the last 72 hours. No lab exists for component: Moiz Collado  Recent Labs      06/08/17   1535   K  3.8       Assessment:  (Medical Decision Making)     Hospital Problems  Date Reviewed: 6/8/2017          Codes Class Noted POA    Essential hypertension (Chronic) ICD-10-CM: I10  ICD-9-CM: 401.9  Unknown Yes    On home med but blood pressure low    Diabetes (HCC) (Chronic) ICD-10-CM: E11.9  ICD-9-CM: 250.00  Unknown Yes    BS up from with steroids - on glucophage at home.  Will get diabetic teaching and may need glucomter, etc.     Polycythemia ([facility] [address] ICD-10-CM: D75.1  ICD-9-CM: 238.4  6/9/2017 Yes    Uses oxygen just with sleep at home - currently dependent Tobacco use (Chronic) ICD-10-CM: Z72.0  ICD-9-CM: 305.1  6/8/2017 Yes    Ongoing. Using nicoderm here    COPD exacerbation [facility]) ICD-10-CM: J44.1  ICD-9-CM: 491.21  6/8/2017 Yes    Still coughing a lot and short of breath with any exertion    Tracheobronchitis ICD-10-CM: J40  ICD-9-CM: 490  6/8/2017 Yes    On doxycycline    Hypoxemia ICD-10-CM: R09.02  ICD-9-CM: 799.02  6/8/2017 Yes    As above          Plan:  (Medical Decision Making)     --continue nebs and flutter valve -- will likely need at home since ?cost.   --change to po steroids  --Using nicoderm here - still smoking at home  --check oxygen with exertion and get overnight oximetry on 2 LPM (home dose)  --Day 4 doxycycline and cultures are negative. --Diabetic teaching needs more teaching  -- for meds help,etc.  --continue remaining treatment. --home tomorrow.      Khushi Soriano MD

## 2017-06-11 NOTE — PROGRESS NOTES
Problem: Nutrition Deficit  Goal: *Optimize nutritional status  Nutrition  Reason for assessment: Referral received from nursing admission Malnutrition Screening Tool for recently lost 2-13# without trying and eating poorly due to decreased appetite. Assessment:   Diet order(s): [facility]  Food/Nutrition Patient History:  The patient reports a recent weight loss r/t illness/dyspnea (pt with severe COPD) and decreased appetite. She states a UBW of 175-180 pounds. The patient does have h/o DM with most recent A1C of 7.1 (5/31/17). She reports that she has been a prediabetic for years and now has been told she has diabetes. She states \"I was told I need to take medicine and eat right, that's it. \"  The patient has multiple questions about [facility] diet (can she eat pasta, drink juice, eat sweets, etc.)  She states that she has never been a breakfast person and will typically skip lunch and eat a very large dinner. The patient does report snacking on items such as  snacks, cookies, etc.  She does not drink sugar sweetened beverages with the exception of OJ. She states that orange juice helps with her shortness of breath. The patient reports various typical dinners: hot dogs with chili, tacos, spaghetti, baked fish, mac n cheese, salads, chicken nuggets. She does like certain types of fruit (berries only.)    RD discussed importance of consuming a consistent amount of CHO throughout they day vs saving all kcal/CHO for night meals. RD provided multiple examples for breakfast consisting of a CHO + PRO (she does not like \"typical\" breakfast foods: sausage, klein, milk with cereal, eggs, etc.  She states that she will typically eat a \"sweet\" for breakfast if she does eat-donuts, cinnamon roll, etc.)  RD suggested purchasing a supplement for breakfast if intake of CHO + PRO did not seem realistic at this time.    RD discussed proper portion sizes of CHO, amount of CHO to have at each meal, and how to read a food label.  Anthropometrics:Height: 5' 6\" (167.6 cm),  Weight: 76.2 kg (168 lb),  , Body mass index is 27.12 kg/(m^2). BMI class of overweight. WT / BMI 6/8/2017 5/31/2017 3/15/2017   WEIGHT 168 lb 172 lb 6.4 oz 178 lb        The patient has had a 10 pound, 5.6% clinically insignificant weight loss over 3 months. Macronutrient needs:  EER:  0307-8582 kcal /day (20-25 kcal/kg listed BW)  EPR:  59-71 grams protein/day (1-1.2 grams/kg IBW)  Intake/Comparative Standards: No recorded meal intake throughout three day admission. Nutrition Diagnosis: Food and nutrition knowledge deficit r/t lack of previous education, as evidenced by patient report of no previous [facility] diet education, requesting education of foods to eat that comply with a [facility] diet. Intervention:  Meals and snacks: Continue current diet. Nutrition Supplement Therapy: glucerna with breakfast, ensure high protein with lunch   Provided patient with menu and ensure high protein to have now. Placed RN miscellaneous order to record intakes. Education: Provided patient with the following handouts: My Meal Plan 1800 kcal [facility] diet, Nutrition 411 1800 kcal [facility] diet guidelines, Sarabjit 1800 kcal [facility] diet, Carbohydrate counting, healthy ways to randi your diet.       Saige Tierney [address], [address] [place] [address], [address] [place] [address], [phone]

## 2017-06-12 VITALS
DIASTOLIC BLOOD PRESSURE: 64 MMHG | HEART RATE: 97 BPM | WEIGHT: 168 LBS | BODY MASS INDEX: 27 KG/M2 | TEMPERATURE: 97.6 F | SYSTOLIC BLOOD PRESSURE: 109 MMHG | OXYGEN SATURATION: 94 % | RESPIRATION RATE: 18 BRPM | HEIGHT: 66 IN

## 2017-06-12 LAB
BACTERIA SPEC CULT: ABNORMAL
GLUCOSE BLD STRIP.AUTO-MCNC: 164 MG/DL (ref 65–100)
GLUCOSE BLD STRIP.AUTO-MCNC: 199 MG/DL (ref 65–100)
GLUCOSE BLD STRIP.AUTO-MCNC: 79 MG/DL (ref 65–100)
GLUCOSE BLD STRIP.AUTO-MCNC: 99 MG/DL (ref 65–100)
GRAM STN SPEC: ABNORMAL
SERVICE CMNT-IMP: ABNORMAL

## 2017-06-12 PROCEDURE — 94762 N-INVAS EAR/PLS OXIMTRY CONT: CPT

## 2017-06-12 PROCEDURE — 74011250637 HC RX REV CODE- 250/637: Performed by: NURSE PRACTITIONER

## 2017-06-12 PROCEDURE — 74011636637 HC RX REV CODE- 636/637: Performed by: INTERNAL MEDICINE

## 2017-06-12 PROCEDURE — 74011000250 HC RX REV CODE- 250: Performed by: INTERNAL MEDICINE

## 2017-06-12 PROCEDURE — 99239 HOSP IP/OBS DSCHRG MGMT >30: CPT | Performed by: INTERNAL MEDICINE

## 2017-06-12 PROCEDURE — 94640 AIRWAY INHALATION TREATMENT: CPT

## 2017-06-12 PROCEDURE — 74011000250 HC RX REV CODE- 250: Performed by: EMERGENCY MEDICINE

## 2017-06-12 PROCEDURE — 77010033678 HC OXYGEN DAILY

## 2017-06-12 PROCEDURE — 74011250637 HC RX REV CODE- 250/637: Performed by: INTERNAL MEDICINE

## 2017-06-12 PROCEDURE — 82962 GLUCOSE BLOOD TEST: CPT

## 2017-06-12 PROCEDURE — 74011250636 HC RX REV CODE- 250/636: Performed by: INTERNAL MEDICINE

## 2017-06-12 PROCEDURE — 74011000258 HC RX REV CODE- 258: Performed by: EMERGENCY MEDICINE

## 2017-06-12 PROCEDURE — 94760 N-INVAS EAR/PLS OXIMETRY 1: CPT

## 2017-06-12 RX ORDER — ALBUTEROL SULFATE 0.83 MG/ML
2.5 SOLUTION RESPIRATORY (INHALATION) 4 TIMES DAILY
Qty: 120 EACH | Refills: 11 | Status: SHIPPED | OUTPATIENT
Start: 2017-06-12 | End: 2018-06-22 | Stop reason: SDUPTHER

## 2017-06-12 RX ORDER — PREDNISONE 10 MG/1
10 TABLET ORAL
Qty: 30 TAB | Refills: 0 | Status: SHIPPED | OUTPATIENT
Start: 2017-06-12 | End: 2017-07-11

## 2017-06-12 RX ORDER — DOXYCYCLINE 100 MG/1
100 CAPSULE ORAL 2 TIMES DAILY
Qty: 5 CAP | Refills: 0 | Status: SHIPPED | OUTPATIENT
Start: 2017-06-12 | End: 2017-07-11

## 2017-06-12 RX ORDER — BUDESONIDE 0.5 MG/2ML
500 INHALANT ORAL 2 TIMES DAILY
Qty: 60 EACH | Refills: 11 | Status: SHIPPED | OUTPATIENT
Start: 2017-06-12 | End: 2018-06-22 | Stop reason: SDUPTHER

## 2017-06-12 RX ORDER — ALBUTEROL SULFATE 0.83 MG/ML
2.5 SOLUTION RESPIRATORY (INHALATION) 4 TIMES DAILY
Qty: 120 EACH | Refills: 11 | Status: SHIPPED | OUTPATIENT
Start: 2017-06-12 | End: 2017-06-12

## 2017-06-12 RX ADMIN — BUDESONIDE 500 MCG: 0.5 SUSPENSION RESPIRATORY (INHALATION) at 07:08

## 2017-06-12 RX ADMIN — HYDROCODONE BITARTRATE AND HOMATROPINE METHYLBROMIDE 5 ML: 5; 1.5 SOLUTION ORAL at 12:13

## 2017-06-12 RX ADMIN — IPRATROPIUM BROMIDE AND ALBUTEROL SULFATE 3 ML: 2.5; .5 SOLUTION RESPIRATORY (INHALATION) at 16:25

## 2017-06-12 RX ADMIN — PRAVASTATIN SODIUM 20 MG: 20 TABLET ORAL at 08:02

## 2017-06-12 RX ADMIN — ISOSORBIDE MONONITRATE 120 MG: 60 TABLET, EXTENDED RELEASE ORAL at 07:24

## 2017-06-12 RX ADMIN — DOXYCYCLINE 100 MG: 100 INJECTION, POWDER, LYOPHILIZED, FOR SOLUTION INTRAVENOUS at 16:00

## 2017-06-12 RX ADMIN — MAGNESIUM HYDROXIDE 30 ML: 400 SUSPENSION ORAL at 08:12

## 2017-06-12 RX ADMIN — IPRATROPIUM BROMIDE AND ALBUTEROL SULFATE 3 ML: 2.5; .5 SOLUTION RESPIRATORY (INHALATION) at 04:20

## 2017-06-12 RX ADMIN — DOXYCYCLINE 100 MG: 100 INJECTION, POWDER, LYOPHILIZED, FOR SOLUTION INTRAVENOUS at 03:55

## 2017-06-12 RX ADMIN — IPRATROPIUM BROMIDE AND ALBUTEROL SULFATE 3 ML: 2.5; .5 SOLUTION RESPIRATORY (INHALATION) at 11:00

## 2017-06-12 RX ADMIN — AMLODIPINE BESYLATE 5 MG: 5 TABLET ORAL at 08:02

## 2017-06-12 RX ADMIN — IPRATROPIUM BROMIDE AND ALBUTEROL SULFATE 3 ML: 2.5; .5 SOLUTION RESPIRATORY (INHALATION) at 07:08

## 2017-06-12 RX ADMIN — CITALOPRAM HYDROBROMIDE 10 MG: 20 TABLET ORAL at 08:02

## 2017-06-12 RX ADMIN — TRAMADOL HYDROCHLORIDE 50 MG: 50 TABLET, FILM COATED ORAL at 08:09

## 2017-06-12 RX ADMIN — DOCUSATE SODIUM 100 MG: 100 CAPSULE, LIQUID FILLED ORAL at 08:12

## 2017-06-12 RX ADMIN — INSULIN HUMAN 2 UNITS: 100 INJECTION, SOLUTION PARENTERAL at 08:03

## 2017-06-12 RX ADMIN — ATENOLOL 25 MG: 25 TABLET ORAL at 08:10

## 2017-06-12 RX ADMIN — INSULIN HUMAN 2 UNITS: 100 INJECTION, SOLUTION PARENTERAL at 12:07

## 2017-06-12 RX ADMIN — ASPIRIN 81 MG: 81 TABLET, COATED ORAL at 08:02

## 2017-06-12 RX ADMIN — METFORMIN HYDROCHLORIDE 500 MG: 500 TABLET, FILM COATED ORAL at 08:02

## 2017-06-12 NOTE — PROGRESS NOTES
Discharge instructions and prescriptions given and explained to pt. Pt verbalized understanding. Medication side effects sheet reviewed with pt. Pt to be discharged home waiting on O2 tank and ride.

## 2017-06-12 NOTE — PROGRESS NOTES
Oxygen Qualifier       Room air: SpO2 with O2 and liter flow   Resting SpO2  94%     Ambulating SpO2  84  86% on 1L   88% on 2L   91% on 3L   Testing performed while patient in a chronic stable state.     Completed by:    Yael Vizcarra RT

## 2017-06-12 NOTE — DISCHARGE SUMMARY
Discharge Note    Patient: Sudeep Bullock                 MRN: [id number]                      YOB: 1960   Age: 62 y.o.           Sex: female                             Admission date:  6/8/2017  Discharge date:  6/12/2017    Admitting Diagnosis:  Acute on chronic respiratory failure with hypoxia and hypercapnia [facility])    Discharge Diagnoses:    Hospital Problems as of 6/12/2017  Date Reviewed: 6/8/2017          Codes Class Noted - Resolved POA    Liver disease (Chronic) ICD-10-CM: K76.9  ICD-9-CM: 573.9  Unknown - Present     Overview Signed 6/9/2017  9:05 AM by Santiago Liu NP     fatty liver             Irritable bowel syndrome (Chronic) ICD-10-CM: K58.9  ICD-9-CM: 564.1  Unknown - Present         GERD (gastroesophageal reflux disease) (Chronic) ICD-10-CM: K21.9  ICD-9-CM: 530.81  Unknown - Present         Essential hypertension (Chronic) ICD-10-CM: I10  ICD-9-CM: 401.9  Unknown - Present Yes        Diabetes ([facility] (Chronic) ICD-10-CM: E11.9  ICD-9-CM: 250.00  Unknown - Present Yes        Chronic pain (Chronic) ICD-10-CM: G89.29  ICD-9-CM: 338.29  Unknown - Present     Overview Signed 6/9/2017  9:05 AM by Santiago Liu NP     fibromyalgia             Polycythemia ([facility] ICD-10-CM: D75.1  ICD-9-CM: 238.4  6/9/2017 - Present Yes        COPD (chronic obstructive pulmonary disease) (HCC) (Chronic) ICD-10-CM: J44.9  ICD-9-CM: 496  6/8/2017 - Present         Tobacco use (Chronic) ICD-10-CM: Z72.0  ICD-9-CM: 305.1  6/8/2017 - Present Yes        * (Principal)COPD exacerbation ([facility] ICD-10-CM: J44.1  ICD-9-CM: 491.21  6/8/2017 - Present Yes        Tracheobronchitis ICD-10-CM: J40  ICD-9-CM: 525  6/8/2017 - Present Yes        Hypoxemia ICD-10-CM: R09.02  ICD-9-CM: 799.02  6/8/2017 - Present Yes        RESOLVED: Dyspnea ICD-10-CM: R06.00  ICD-9-CM: 786.09  6/8/2017 - 6/9/2017               Consultants: diabetic educator    Studies/Procedures:  CXR  Overnight oximetry      Disposition: Home  Dicharged Condition: stable    Hospital course:  Admitted with COPD exacerbation and bronchitis. She was treated with IV steroids, bronchodilators, antibiotics, mucolytics and a flutter valve. She was hypoxic on admission and remains oxygen dependent. Her oxygen needs were assessed on the day of discharge:         Room air: SpO2 with O2 and liter flow   Resting SpO2 94%      Ambulating SpO2 84 86% on 1L  88% on 2L  91% on 3L     She was only wearing oxygen with sleep before admission but she was noted to have polycythemia so I suspect she needed continuous oxygen therapy even before admission. Overnight oximetry showed no significant desaturations on 2 liters. She requested nebulized medications as opposed to inhalers so these prescriptions are provided at discharge and Leroy Jones was notified. The Turdoza and Symbicort are being stopped and I just ordered plain albuterol for now as she continues to have trouble with dryness and difficulty clearing secretions. She also requested a portable oxygen system (back pack). She is a diabetic but does not have a home glucometer. Diabetic education was ordered before discharge in addition to a home glucometer and home health nursing. She will follow up with us in the office in 3 to 4 weeks. Smoking cessation has been encouraged. Would consider pulmonary rehab when she recovers from this acute illness. Physical Exam:   Visit Vitals    /61 (BP 1 Location: Right arm, BP Patient Position: At rest)    Pulse 83    Temp 97.5 °F (36.4 °C)    Resp 19    Ht 5' 6\" (1.676 m)    Wt 168 lb (76.2 kg)    SpO2 91%    BMI 27.12 kg/m2     Constitutional:  the patient is well developed and in no acute distress  HEENT:  Sclera clear, pupils equal, oral mucosa moist  Respiratory: clear but overall decreased and coughs a lot with deep breathing. Wearing 2 liter cannula. Cardiovascular:  RRR without M,G,R  Abd/GI: soft and non-tender; with positive bowel sounds.   Ext: warm without cyanosis. There is no lower leg edema. Musculoskeletal: moves all four extremities with equal strength  Skin:  no jaundice or rashes, no wounds   Neuro: no gross neuro deficits     Psychiatric:  alert and oriented, calm    LAB  Lab Results   Component Value Date/Time    WBC 13.2 06/08/2017 12:40 PM    HGB 16.5 06/08/2017 12:40 PM    HCT 47.2 06/08/2017 12:40 PM    PLATELET 847 54/03/6190 12:40 PM    MCV 87.4 06/08/2017 12:40 PM     Lab Results   Component Value Date/Time    Sodium 138 06/08/2017 12:40 PM    Potassium 3.8 06/08/2017 03:35 PM    Chloride 104 06/08/2017 12:40 PM    CO2 23 06/08/2017 12:40 PM    Anion gap 11 06/08/2017 12:40 PM    Glucose 115 06/08/2017 12:40 PM    BUN 8 06/08/2017 12:40 PM    Creatinine 0.76 06/08/2017 12:40 PM    BUN/Creatinine ratio 13 05/31/2017 12:32 PM    GFR est AA >60 06/08/2017 12:40 PM    GFR est non-AA >60 06/08/2017 12:40 PM    Calcium 9.1 06/08/2017 12:40 PM         Discharge Medications:   Current Discharge Medication List      START taking these medications    Details   budesonide (PULMICORT) 0.5 mg/2 mL nbsp 2 mL by Nebulization route two (2) times a day. Qty: 60 Each, Refills: 11      doxycycline (MONODOX) 100 mg capsule Take 1 Cap by mouth two (2) times a day. Qty: 5 Cap, Refills: 0      predniSONE (DELTASONE) 10 mg tablet 40mg per day for 3 days, then 30mg per day for 3 days, 20mg per day for 3 days then 10mg per day for 3 days, then stop. Qty: 30 Tab, Refills: 0      guaiFENesin (MUCINEX) 1,200 mg Ta12 ER tablet Take 1 Tab by mouth two (2) times a day. Qty: 60 Tab, Refills: 1         CONTINUE these medications which have CHANGED    Details   albuterol (PROVENTIL VENTOLIN) 2.5 mg /3 mL (0.083 %) nebulizer solution 3 mL by Nebulization route four (4) times daily. 1 vial via nebulizer qid prn  Qty: 120 Each, Refills: 11         CONTINUE these medications which have NOT CHANGED    Details   citalopram (CELEXA) 10 mg tablet Take 10 mg by mouth every morning. metFORMIN (GLUCOPHAGE) 500 mg tablet Take 1 Tab by mouth daily (with breakfast). Qty: 30 Tab, Refills: 5      meclizine (ANTIVERT) 25 mg tablet Take 1 Tab by mouth three (3) times daily as needed. Indications: VERTIGO  Qty: 30 Tab, Refills: 1      amLODIPine (NORVASC) 5 mg tablet Take 1 Tab by mouth daily. Qty: 30 Tab, Refills: 11      pravastatin (PRAVACHOL) 20 mg tablet Take 1 Tab by mouth daily. Qty: 30 Tab, Refills: 11      atenolol (TENORMIN) 25 mg tablet Take 1 Tab by mouth daily. Qty: 30 Tab, Refills: 11      traMADol (ULTRAM) 50 mg tablet Take 1 Tab by mouth every six (6) hours as needed for Pain. Max Daily Amount: 200 mg. Qty: 120 Tab, Refills: 5      LORazepam (ATIVAN) 0.5 mg tablet Take one tablet three times a day when needed. Qty: 90 Tab, Refills: 5      traZODone (DESYREL) 150 mg tablet Take 1 Tab by mouth nightly. Qty: 30 Tab, Refills: 5      aspirin delayed-release 81 mg tablet Take 1 Tab by mouth daily. Qty: 30 Tab, Refills: 11    Associated Diagnoses: Ischemic chest pain ([address] ASCVD (arteriosclerotic cardiovascular disease)      albuterol (PROAIR HFA) 90 mcg/actuation inhaler 2 puffs 4 times daily prn  Qty: 1 Inhaler, Refills: 11      nitroglycerin (NITROSTAT) 0.4 mg SL tablet 1 Tab by SubLINGual route every five (5) minutes as needed for Chest Pain. Qty: 25 Tab, Refills: 11      isosorbide mononitrate ER (IMDUR) 120 mg CR tablet Take 1 Tab by mouth every morning. Qty: 30 Tab, Refills: 11    Associated Diagnoses: ASCVD (arteriosclerotic cardiovascular disease); Chest pain, unspecified type      cyclobenzaprine (FLEXERIL) 10 mg tablet Take 1 Tab by mouth three (3) times daily as needed for Muscle Spasm(s).   Qty: 30 Tab, Refills: 5         STOP taking these medications       OXYGEN-AIR DELIVERY SYSTEMS Comments:   Reason for Stopping: recalculating needs        aclidinium bromide (TUDORZA PRESSAIR) 400 mcg/actuation inhaler Comments:   Reason for Stopping: can't afford budesonide-formoterol (SYMBICORT) 160-4.5 mcg/actuation HFA inhaler Comments:   Reason for Stopping: can't afford              Followup/Outpt Studies:  Activity: Activity as tolerated  Diet: Diabetic Diet  Wound Care: None needed    --Follow up appointment with [facility] in 3 to 4 weeks   --IVAN Rosenbaum  June 12, 2017    --Total discharge greater than 30 minutes in duration. More than 50% of time documented was spent face-to-face contact with the patient and in the care of the patient on the floor/unit where the patient is located  Lungs:  clear  Heart:  RRR with no Murmur/Rubs/Gallops    Additional Comments:  Home O2-needs 3 L with exertion    I have spoken with and examined the patient. I agree with the above assessment and plan as documented.     Albert Ozuna MD

## 2017-06-12 NOTE — PROGRESS NOTES
Care Management Interventions  PCP Verified by CM: Yes (Dr. Doug Echavarria)  Last Visit to PCP: 05/31/17  Mode of Transport at Discharge: Other (see comment)  Transition of Care Consult (CM Consult): Discharge Planning, [address]: No  Reason Outside [place]: Out of service area  Discharge Durable Medical Equipment: Yes (updated O2 orders to [address])  Physical Therapy Consult: No  Occupational Therapy Consult: No  Speech Therapy Consult: No  Current Support Network: Lives with Spouse, Own Home  Confirm Follow Up Transport: Family  Plan discussed with Pt/Family/Caregiver: Yes  Freedom of Choice Offered: Yes  Discharge Location  Discharge Placement: Home with home health ([facility])    New O2 order and nebulizer medication faxed to [address]. [facility]Y spoke with Tania Rinne in their intake department to alert them to these new orders. GY[facility]Y has requested that a portable tank be delivered to the pt's room asap. GYPSY also noted to [address] that the pt is requesting a backpack O2 carrier. HH order received and forwarded to [address] [place] [address] for RN services. No other discharge needs or concerns identified or reported. GY[facility]Y remains available to assist as needed.

## 2017-06-12 NOTE — DISCHARGE INSTRUCTIONS
Chronic Obstructive Pulmonary Disease (COPD): Care Instructions  Your Care Instructions    Chronic obstructive pulmonary disease (COPD) is a general term for a group of lung diseases, including emphysema and chronic bronchitis. People with COPD have decreased airflow in and out of the lungs, which makes it hard to breathe. The airways also can get clogged with thick mucus. Cigarette smoking is a major cause of COPD. Although there is no cure for COPD, you can slow its progress. Following your treatment plan and taking care of yourself can help you feel better and live longer. Follow-up care is a key part of your treatment and safety. Be sure to make and go to all appointments, and call your doctor if you are having problems. It's also a good idea to know your test results and keep a list of the medicines you take. How can you care for yourself at home? Staying healthy  · Do not smoke. This is the most important step you can take to prevent more damage to your lungs. If you need help quitting, talk to your doctor about stop-smoking programs and medicines. These can increase your chances of quitting for good. · Avoid colds and flu. Get a pneumococcal vaccine shot. If you have had one before, ask your doctor whether you need a second dose. Get the flu vaccine every fall. If you must be around people with colds or the flu, wash your hands often. · Avoid secondhand smoke, air pollution, and high altitudes. Also avoid cold, dry air and hot, humid air. Stay at home with your windows closed when air pollution is bad. Medicines and oxygen therapy  · Take your medicines exactly as prescribed. Call your doctor if you think you are having a problem with your medicine. · You may be taking medicines such as:  ¨ Bronchodilators. These help open your airways and make breathing easier. Bronchodilators are either short-acting (work for 6 to 9 hours) or long-acting (work for 24 hours).  You inhale most bronchodilators, so they start to act quickly. Always carry your quick-relief inhaler with you in case you need it while you are away from home. ¨ Corticosteroids (prednisone, budesonide). These reduce airway inflammation. They come in pill or inhaled form. You must take these medicines every day for them to work well. · A spacer may help you get more inhaled medicine to your lungs. Ask your doctor or pharmacist if a spacer is right for you. If it is, ask how to use it properly. · Do not take any vitamins, over-the-counter medicine, or herbal products without talking to your doctor first.  · If your doctor prescribed antibiotics, take them as directed. Do not stop taking them just because you feel better. You need to take the full course of antibiotics. · Oxygen therapy boosts the amount of oxygen in your blood and helps you breathe easier. Use the flow rate your doctor has recommended, and do not change it without talking to your doctor first.  Activity  · Get regular exercise. Walking is an easy way to get exercise. Start out slowly, and walk a little more each day. · Pay attention to your breathing. You are exercising too hard if you cannot talk while you are exercising. · Take short rest breaks when doing household chores and other activities. · Learn breathing methods--such as breathing through pursed lips--to help you become less short of breath. · If your doctor has not set you up with a pulmonary rehabilitation program, talk to him or her about whether rehab is right for you. Rehab includes exercise programs, education about your disease and how to manage it, help with diet and other changes, and emotional support. Diet  · Eat regular, healthy meals. Use bronchodilators about 1 hour before you eat to make it easier to eat. Eat several small meals instead of three large ones. Drink beverages at the end of the meal. Avoid foods that are hard to chew.   · Eat foods that contain protein so that you do not lose muscle mass.  Mental health  · Talk to your family, friends, or a therapist about your feelings. It is normal to feel frightened, angry, hopeless, helpless, and even guilty. Talking openly about bad feelings can help you cope. If these feelings last, talk to your doctor. When should you call for help? Call 911 anytime you think you may need emergency care. For example, call if:  · You have severe trouble breathing. Call your doctor now or seek immediate medical care if:  · You have new or worse trouble breathing. · You cough up blood. · You have a fever. Watch closely for changes in your health, and be sure to contact your doctor if:  · You cough more deeply or more often, especially if you notice more mucus or a change in the color of your mucus. · You have new or worse swelling in your legs or belly. · You are not getting better as expected. Where can you learn more? Go to http://sahara-perez.info/. Dylon Gonzalez in the search box to learn more about \"Chronic Obstructive Pulmonary Disease (COPD): Care Instructions. \"  Current as of: May 23, 2016  Content Version: 11.2  © 8695-3690 [employer]. Care instructions adapted under license by [employer] (which disclaims liability or warranty for this information). If you have questions about a medical condition or this instruction, always ask your healthcare professional. [address] any warranty or liability for your use of this information.     DISCHARGE SUMMARY from Nurse    The following personal items are in your possession at time of discharge:    Dental Appliances: None        Home Medications: None  Jewelry: Bracelet, Ring (ankle and wrist: nose ring)  Clothing: Slippers, Undergarments, At bedside, Shirt  Other Valuables: Cell Phone, Eyeglasses             PATIENT INSTRUCTIONS:    After general anesthesia or intravenous sedation, for 24 hours or while taking prescription Narcotics:  · Limit your activities  · Do not drive and operate hazardous machinery  · Do not make important personal or business decisions  · Do  not drink alcoholic beverages  · If you have not urinated within 8 hours after discharge, please contact your surgeon on call. Report the following to your surgeon:  · Excessive pain, swelling, redness or odor of or around the surgical area  · Temperature over 100.5  · Nausea and vomiting lasting longer than 4 hours or if unable to take medications  · Any signs of decreased circulation or nerve impairment to extremity: change in color, persistent  numbness, tingling, coldness or increase pain  · Any questions        What to do at Home:  Recommended activity: Activity as tolerated, diet as tolerated. *  Please give a list of your current medications to your Primary Care Provider. *  Please update this list whenever your medications are discontinued, doses are      changed, or new medications (including over-the-counter products) are added. *  Please carry medication information at all times in case of emergency situations. These are general instructions for a healthy lifestyle:    No smoking/ No tobacco products/ Avoid exposure to second hand smoke    Surgeon General's Warning:  Quitting smoking now greatly reduces serious risk to your health. Obesity, smoking, and sedentary lifestyle greatly increases your risk for illness    A healthy diet, regular physical exercise & weight monitoring are important for maintaining a healthy lifestyle    You may be retaining fluid if you have a history of heart failure or if you experience any of the following symptoms:  Weight gain of 3 pounds or more overnight or 5 pounds in a week, increased swelling in our hands or feet or shortness of breath while lying flat in bed. Please call your doctor as soon as you notice any of these symptoms; do not wait until your next office visit.     Recognize signs and symptoms of STROKE:    F-face looks uneven    A-arms unable to move or move unevenly    S-speech slurred or non-existent    T-time-call 911 as soon as signs and symptoms begin-DO NOT go       Back to bed or wait to see if you get better-TIME IS BRAIN. Warning Signs of HEART ATTACK     Call 911 if you have these symptoms:   Chest discomfort. Most heart attacks involve discomfort in the center of the chest that lasts more than a few minutes, or that goes away and comes back. It can feel like uncomfortable pressure, squeezing, fullness, or pain.  Discomfort in other areas of the upper body. Symptoms can include pain or discomfort in one or both arms, the back, neck, jaw, or stomach.  Shortness of breath with or without chest discomfort.  Other signs may include breaking out in a cold sweat, nausea, or lightheadedness. Don't wait more than five minutes to call 911 - MINUTES MATTER! Fast action can save your life. Calling 911 is almost always the fastest way to get lifesaving treatment. Emergency Medical Services staff can begin treatment when they arrive -- up to an hour sooner than if someone gets to the hospital by car. The discharge information has been reviewed with the patient. The patient verbalized understanding. Discharge medications reviewed with the patient and appropriate educational materials and side effects teaching were provided.

## 2017-06-12 NOTE — DIABETES MGMT
Pt admitted 6/8/17 with COPD exacerbation and bronchitis is seen by RN FLORENCIA for diabetes education. A1C 7.1. Pt says she was diagnosed with type 2 diabetes last year, but was prediabetic several years prior to that. Says she takes Metformin 500 mg with breakfast daily at home. Reviewed current regular insulin sliding scale regimen with pt. Verbalizes understanding. Pt has been on SoluMedrol: now tapered down with prednisone taper now ordered. Blood glucose ranged 139-194 yesterday with total of 4 units regular insulin given and 164-199 so far today. Reviewed with pt discharge order for discharge on Metformin per home dosing. Pt says she has not received diabetes education in the past. Pt given educational material, \"Survival Skills for Diabetes Management\", which was reviewed with pt. Explained basic physiology of diabetes, as well as causes, s/s, and treatments for hypoglycemia and hyperglycemia. Described the effects of poor glycemic control on the development of long-term complications such as renal, eye, nerve, and cardiovascular disease. Described proper diabetic foot care and the importance of checking feet qd. Educated re: effects of carbohydrates on blood glucose, the \"plate method\" of healthy meal planning, basics of healthy meal plan, and Consistent Carbohydrate Diet. Also explained the relationship between hyperglycemia and infection. Reviewed relationship between steroids and hyperglycemia. Discussed target goals for blood glucose and A1C. Pt verbalizes understanding of teaching. Pt says she has a healthy diet at home and avoids sugar sweetened beverages. Pt has never checked blood glucose at home. Explained the importance of blood glucose monitoring. Recommended frequency of fasting daily with occasional pre dinner blood glucose and 2 hr PPBS and to record in log book to take to PCP appointment. Provided blood glucose meter, strips, and instructed pt in use of meter.  Pt was able to return demonstrate correct use of meter. Stressed importance of following with PCP for diabetes management. Pt will need prescription for strips and lancets at discharge. Pt is awaiting discharge later today and has no further questions.

## 2017-06-13 LAB
BACTERIA SPEC CULT: NORMAL
BACTERIA SPEC CULT: NORMAL
SERVICE CMNT-IMP: NORMAL
SERVICE CMNT-IMP: NORMAL

## 2017-06-13 NOTE — PROGRESS NOTES
SW received call from [facility] that they are not in network with the pt's insurance. [facility] referral faxed to [address] after online review of [facility] providers.

## 2017-07-11 PROBLEM — R07.9 CHEST PAIN: Status: ACTIVE | Noted: 2017-07-11

## 2017-07-31 PROBLEM — J44.1 COPD EXACERBATION (HCC): Status: RESOLVED | Noted: 2017-06-08 | Resolved: 2017-07-31

## 2017-09-25 ENCOUNTER — HOSPITAL ENCOUNTER (OUTPATIENT)
Dept: CT IMAGING | Age: 57
Discharge: HOME | End: 2017-09-25
Attending: NURSE PRACTITIONER
Payer: COMMERCIAL

## 2017-09-25 DIAGNOSIS — F17.218 CIGARETTE NICOTINE DEPENDENCE WITH OTHER NICOTINE-INDUCED DISORDER: ICD-10-CM

## 2017-09-25 PROCEDURE — G0297 LDCT FOR LUNG CA SCREEN: HCPCS

## 2017-10-16 ENCOUNTER — TELEPHONE (OUTPATIENT)
Dept: CARDIAC REHAB | Age: 57
End: 2017-10-16

## 2017-10-16 NOTE — TELEPHONE ENCOUNTER
Patient has been contacted several times between 8/4/2017 and 9/18/2017. She last stated that she has problems with transportation and would rather call us when she is ready to proceed with [place].

## 2018-04-18 PROBLEM — Z99.81 DEPENDENCE ON CONTINUOUS SUPPLEMENTAL OXYGEN: Status: ACTIVE | Noted: 2018-04-18

## 2018-04-18 PROBLEM — R09.02 HYPOXEMIA: Status: RESOLVED | Noted: 2017-06-08 | Resolved: 2018-04-18

## 2018-04-18 PROBLEM — G89.4 CHRONIC PAIN SYNDROME: Chronic | Status: ACTIVE | Noted: 2018-04-18

## 2018-04-18 PROBLEM — R07.9 CHEST PAIN: Status: RESOLVED | Noted: 2017-07-11 | Resolved: 2018-04-18

## 2018-04-20 ENCOUNTER — HOSPITAL ENCOUNTER (OUTPATIENT)
Dept: MAMMOGRAPHY | Age: 58
Discharge: HOME | End: 2018-04-20
Attending: INTERNAL MEDICINE
Payer: COMMERCIAL

## 2018-04-20 DIAGNOSIS — Z12.31 SCREENING MAMMOGRAM, ENCOUNTER FOR: ICD-10-CM

## 2018-04-20 PROCEDURE — 77067 SCR MAMMO BI INCL CAD: CPT

## 2018-06-22 PROBLEM — J41.8 MIXED SIMPLE AND MUCOPURULENT CHRONIC BRONCHITIS (HCC): Status: ACTIVE | Noted: 2018-06-22

## 2018-09-26 ENCOUNTER — HOSPITAL ENCOUNTER (OUTPATIENT)
Dept: CT IMAGING | Age: 58
Discharge: HOME | End: 2018-09-26
Attending: NURSE PRACTITIONER
Payer: COMMERCIAL

## 2018-09-26 VITALS — WEIGHT: 155 LBS | BODY MASS INDEX: 24.91 KG/M2 | HEIGHT: 66 IN

## 2018-09-26 DIAGNOSIS — F17.210 CIGARETTE NICOTINE DEPENDENCE WITHOUT COMPLICATION: Chronic | ICD-10-CM

## 2018-09-26 DIAGNOSIS — Z72.0 TOBACCO USE: Chronic | ICD-10-CM

## 2018-09-26 PROCEDURE — G0297 LDCT FOR LUNG CA SCREEN: HCPCS

## 2019-01-25 PROBLEM — R00.2 PALPITATIONS: Status: ACTIVE | Noted: 2019-01-25

## 2019-04-22 PROBLEM — E55.9 VITAMIN D DEFICIENCY: Status: ACTIVE | Noted: 2019-04-22

## 2019-06-10 PROBLEM — N30.90 CYSTITIS: Status: ACTIVE | Noted: 2019-06-10

## 2019-07-13 NOTE — PROGRESS NOTES
Patient pre-assessment complete for [facility] poss with Dr Hawa Sparks  scheduled for  at 9:30am, arrival time 7:30am. Patient verified using . Patient instructed to bring all home medications in labeled bottles on the day of procedure. NPO status reinforced. Patient informed to take a full dose aspirin 325mg  or 81 mg x 4 on the day of procedure. Patient instructed to HOLD metformin starting on . Instructed they can take all other medications excluding vitamins & supplements. Patient verbalizes understanding of all instructions & denies any questions at this time.

## 2019-07-15 ENCOUNTER — HOSPITAL ENCOUNTER (OUTPATIENT)
Dept: CARDIAC CATH/INVASIVE PROCEDURES | Age: 59
Discharge: HOME | End: 2019-07-15
Attending: INTERNAL MEDICINE | Admitting: INTERNAL MEDICINE
Payer: COMMERCIAL

## 2019-07-15 VITALS
RESPIRATION RATE: 18 BRPM | HEART RATE: 68 BPM | OXYGEN SATURATION: 95 % | BODY MASS INDEX: 25.71 KG/M2 | HEIGHT: 66 IN | SYSTOLIC BLOOD PRESSURE: 113 MMHG | DIASTOLIC BLOOD PRESSURE: 62 MMHG | WEIGHT: 160 LBS

## 2019-07-15 LAB
ACT BLD: 230 SECS (ref 70–128)
ANION GAP SERPL CALC-SCNC: 8 MMOL/L (ref 7–16)
ATRIAL RATE: 64 BPM
BUN SERPL-MCNC: 14 MG/DL (ref 6–23)
CALCIUM SERPL-MCNC: 8.8 MG/DL (ref 8.3–10.4)
CALCULATED P AXIS, ECG09: 56 DEGREES
CALCULATED R AXIS, ECG10: 20 DEGREES
CALCULATED T AXIS, ECG11: 63 DEGREES
CHLORIDE SERPL-SCNC: 105 MMOL/L (ref 98–107)
CO2 SERPL-SCNC: 27 MMOL/L (ref 21–32)
CREAT SERPL-MCNC: 0.81 MG/DL (ref 0.6–1)
DIAGNOSIS, 93000: NORMAL
ERYTHROCYTE [DISTWIDTH] IN BLOOD BY AUTOMATED COUNT: 14.1 % (ref 11.9–14.6)
GLUCOSE SERPL-MCNC: 110 MG/DL (ref 65–100)
HCT VFR BLD AUTO: 46.4 % (ref 35.8–46.3)
HGB BLD-MCNC: 15.4 G/DL (ref 11.7–15.4)
INR PPP: 1
MAGNESIUM SERPL-MCNC: 2 MG/DL (ref 1.8–2.4)
MCH RBC QN AUTO: 30.7 PG (ref 26.1–32.9)
MCHC RBC AUTO-ENTMCNC: 33.2 G/DL (ref 31.4–35)
MCV RBC AUTO: 92.4 FL (ref 79.6–97.8)
NRBC # BLD: 0 K/UL (ref 0–0.2)
P-R INTERVAL, ECG05: 184 MS
PLATELET # BLD AUTO: 250 K/UL (ref 150–450)
PMV BLD AUTO: 8.7 FL (ref 9.4–12.3)
POTASSIUM SERPL-SCNC: 3.9 MMOL/L (ref 3.5–5.1)
PROTHROMBIN TIME: 13.5 SEC (ref 11.7–14.5)
Q-T INTERVAL, ECG07: 418 MS
QRS DURATION, ECG06: 78 MS
QTC CALCULATION (BEZET), ECG08: 431 MS
RBC # BLD AUTO: 5.02 M/UL (ref 4.05–5.2)
SODIUM SERPL-SCNC: 140 MMOL/L (ref 136–145)
VENTRICULAR RATE, ECG03: 64 BPM
WBC # BLD AUTO: 10.3 K/UL (ref 4.3–11.1)

## 2019-07-15 PROCEDURE — 77030029997 HC DEV COM RDL R BND TELE -B

## 2019-07-15 PROCEDURE — 85610 PROTHROMBIN TIME: CPT

## 2019-07-15 PROCEDURE — 85027 COMPLETE CBC AUTOMATED: CPT

## 2019-07-15 PROCEDURE — 93005 ELECTROCARDIOGRAM TRACING: CPT | Performed by: INTERNAL MEDICINE

## 2019-07-15 PROCEDURE — 74011250637 HC RX REV CODE- 250/637: Performed by: INTERNAL MEDICINE

## 2019-07-15 PROCEDURE — 77030004534 HC CATH ANGI DX INFN CARD -A

## 2019-07-15 PROCEDURE — 99153 MOD SED SAME PHYS/QHP EA: CPT

## 2019-07-15 PROCEDURE — 93571 IV DOP VEL&/PRESS C FLO 1ST: CPT

## 2019-07-15 PROCEDURE — 80048 BASIC METABOLIC PNL TOTAL CA: CPT

## 2019-07-15 PROCEDURE — 74011000250 HC RX REV CODE- 250: Performed by: INTERNAL MEDICINE

## 2019-07-15 PROCEDURE — 74011250636 HC RX REV CODE- 250/636

## 2019-07-15 PROCEDURE — 74011250636 HC RX REV CODE- 250/636: Performed by: INTERNAL MEDICINE

## 2019-07-15 PROCEDURE — C1769 GUIDE WIRE: HCPCS

## 2019-07-15 PROCEDURE — 99152 MOD SED SAME PHYS/QHP 5/>YRS: CPT

## 2019-07-15 PROCEDURE — 93458 L HRT ARTERY/VENTRICLE ANGIO: CPT

## 2019-07-15 PROCEDURE — 77030012468 HC VLV BLEEDBK CNTRL ABBT -B

## 2019-07-15 PROCEDURE — 74011636320 HC RX REV CODE- 636/320: Performed by: INTERNAL MEDICINE

## 2019-07-15 PROCEDURE — 85347 COAGULATION TIME ACTIVATED: CPT

## 2019-07-15 PROCEDURE — C1894 INTRO/SHEATH, NON-LASER: HCPCS

## 2019-07-15 PROCEDURE — 83735 ASSAY OF MAGNESIUM: CPT

## 2019-07-15 PROCEDURE — C1887 CATHETER, GUIDING: HCPCS

## 2019-07-15 RX ORDER — HEPARIN SODIUM 200 [USP'U]/100ML
3 INJECTION, SOLUTION INTRAVENOUS CONTINUOUS
Status: DISCONTINUED | OUTPATIENT
Start: 2019-07-15 | End: 2019-07-15 | Stop reason: HOSPADM

## 2019-07-15 RX ORDER — HYDROCODONE BITARTRATE AND ACETAMINOPHEN 5; 325 MG/1; MG/1
1 TABLET ORAL
Status: CANCELLED | OUTPATIENT
Start: 2019-07-15

## 2019-07-15 RX ORDER — MIDAZOLAM HYDROCHLORIDE 1 MG/ML
.5-2 INJECTION, SOLUTION INTRAMUSCULAR; INTRAVENOUS
Status: DISCONTINUED | OUTPATIENT
Start: 2019-07-15 | End: 2019-07-15 | Stop reason: HOSPADM

## 2019-07-15 RX ORDER — HYDROCORTISONE SODIUM SUCCINATE 100 MG/2ML
100 INJECTION, POWDER, FOR SOLUTION INTRAMUSCULAR; INTRAVENOUS ONCE
Status: COMPLETED | OUTPATIENT
Start: 2019-07-15 | End: 2019-07-15

## 2019-07-15 RX ORDER — NALOXONE HYDROCHLORIDE 0.4 MG/ML
0.4 INJECTION, SOLUTION INTRAMUSCULAR; INTRAVENOUS; SUBCUTANEOUS AS NEEDED
Status: CANCELLED | OUTPATIENT
Start: 2019-07-15

## 2019-07-15 RX ORDER — FAMOTIDINE 10 MG/ML
20 INJECTION INTRAVENOUS AS NEEDED
Status: DISCONTINUED | OUTPATIENT
Start: 2019-07-15 | End: 2019-07-15 | Stop reason: HOSPADM

## 2019-07-15 RX ORDER — DIPHENHYDRAMINE HCL 25 MG
25 CAPSULE ORAL AS NEEDED
Status: DISCONTINUED | OUTPATIENT
Start: 2019-07-15 | End: 2019-07-15 | Stop reason: HOSPADM

## 2019-07-15 RX ORDER — LIDOCAINE HYDROCHLORIDE 10 MG/ML
10-200 INJECTION INFILTRATION; PERINEURAL ONCE
Status: COMPLETED | OUTPATIENT
Start: 2019-07-15 | End: 2019-07-15

## 2019-07-15 RX ORDER — SODIUM CHLORIDE 0.9 % (FLUSH) 0.9 %
5-40 SYRINGE (ML) INJECTION EVERY 8 HOURS
Status: CANCELLED | OUTPATIENT
Start: 2019-07-15

## 2019-07-15 RX ORDER — SODIUM CHLORIDE 0.9 % (FLUSH) 0.9 %
5 SYRINGE (ML) INJECTION AS NEEDED
Status: DISCONTINUED | OUTPATIENT
Start: 2019-07-15 | End: 2019-07-15 | Stop reason: HOSPADM

## 2019-07-15 RX ORDER — HEPARIN SODIUM 10000 [USP'U]/ML
40-80 INJECTION, SOLUTION INTRAVENOUS; SUBCUTANEOUS
Status: DISCONTINUED | OUTPATIENT
Start: 2019-07-15 | End: 2019-07-15 | Stop reason: HOSPADM

## 2019-07-15 RX ORDER — ONDANSETRON 2 MG/ML
4 INJECTION INTRAMUSCULAR; INTRAVENOUS
Status: CANCELLED | OUTPATIENT
Start: 2019-07-15 | End: 2019-07-16

## 2019-07-15 RX ORDER — SODIUM CHLORIDE 9 MG/ML
75 INJECTION, SOLUTION INTRAVENOUS CONTINUOUS
Status: DISCONTINUED | OUTPATIENT
Start: 2019-07-15 | End: 2019-07-15 | Stop reason: HOSPADM

## 2019-07-15 RX ORDER — GUAIFENESIN 100 MG/5ML
324 LIQUID (ML) ORAL ONCE
Status: DISCONTINUED | OUTPATIENT
Start: 2019-07-15 | End: 2019-07-15 | Stop reason: HOSPADM

## 2019-07-15 RX ORDER — FENTANYL CITRATE 50 UG/ML
25-50 INJECTION, SOLUTION INTRAMUSCULAR; INTRAVENOUS
Status: DISCONTINUED | OUTPATIENT
Start: 2019-07-15 | End: 2019-07-15 | Stop reason: HOSPADM

## 2019-07-15 RX ORDER — MORPHINE SULFATE 2 MG/ML
1 INJECTION, SOLUTION INTRAMUSCULAR; INTRAVENOUS
Status: CANCELLED | OUTPATIENT
Start: 2019-07-15

## 2019-07-15 RX ORDER — SODIUM CHLORIDE 9 MG/ML
75 INJECTION, SOLUTION INTRAVENOUS CONTINUOUS
Status: CANCELLED | OUTPATIENT
Start: 2019-07-15

## 2019-07-15 RX ORDER — ACETAMINOPHEN 325 MG/1
650 TABLET ORAL
Status: CANCELLED | OUTPATIENT
Start: 2019-07-15

## 2019-07-15 RX ORDER — SODIUM CHLORIDE 0.9 % (FLUSH) 0.9 %
5-40 SYRINGE (ML) INJECTION AS NEEDED
Status: CANCELLED | OUTPATIENT
Start: 2019-07-15

## 2019-07-15 RX ADMIN — HEPARIN SODIUM 3 ML/HR: 5000 INJECTION, SOLUTION INTRAVENOUS; SUBCUTANEOUS at 10:32

## 2019-07-15 RX ADMIN — HEPARIN SODIUM 2.3 ML: 10000 INJECTION, SOLUTION INTRAVENOUS; SUBCUTANEOUS at 10:41

## 2019-07-15 RX ADMIN — SODIUM CHLORIDE 75 ML/HR: 900 INJECTION, SOLUTION INTRAVENOUS at 08:01

## 2019-07-15 RX ADMIN — LIDOCAINE HYDROCHLORIDE 2 ML: 10 INJECTION, SOLUTION INFILTRATION; PERINEURAL at 10:41

## 2019-07-15 RX ADMIN — DIPHENHYDRAMINE HYDROCHLORIDE 50 MG: 25 CAPSULE ORAL at 08:00

## 2019-07-15 RX ADMIN — FENTANYL CITRATE 25 MCG: 50 INJECTION, SOLUTION INTRAMUSCULAR; INTRAVENOUS at 10:41

## 2019-07-15 RX ADMIN — FAMOTIDINE 20 MG: 10 INJECTION, SOLUTION INTRAVENOUS at 08:01

## 2019-07-15 RX ADMIN — MIDAZOLAM HYDROCHLORIDE 2 MG: 1 INJECTION, SOLUTION INTRAMUSCULAR; INTRAVENOUS at 10:41

## 2019-07-15 RX ADMIN — HEPARIN SODIUM 3000 UNITS: 10000 INJECTION, SOLUTION INTRAVENOUS; SUBCUTANEOUS at 11:00

## 2019-07-15 RX ADMIN — HYDROCORTISONE SODIUM SUCCINATE 100 MG: 100 INJECTION, POWDER, FOR SOLUTION INTRAMUSCULAR; INTRAVENOUS at 08:00

## 2019-07-15 RX ADMIN — IOPAMIDOL 120 ML: 755 INJECTION, SOLUTION INTRAVENOUS at 11:07

## 2019-07-15 NOTE — PROGRESS NOTES
1345-patient ambulated to bath room with no difficulties. Right radial with no bleeding or hematoma. r band removed and sterile dressing applied to site. 1350- all discharge instructions explained to patient and family. Time allowed for questions no. No questions and concerns at this time. 1355- right radial checked. Site with no redness or bleeding. pt discharged to home via Wheelchair with family.

## 2019-07-15 NOTE — PROCEDURES
Brief Cardiac Procedure Note    Patient: Maddie Matias MRN: [id number]  SSN: [id number]    YOB: 1960  Age: 61 y.o. Sex: female      Date of Procedure: 7/15/2019     Pre-procedure Diagnosis: Atypical Angina    Post-procedure Diagnosis: Coronary Artery Disease    Reason for Procedure: New Onset Angina < or = 2 Months    Procedure: Left Heart Catheterization    Brief Description of Procedure: LHC via R radial artery. IFR of RCA    Performed By: Michelle Lyon MD     Assistants: None    Anesthesia: Moderate Sedation    Estimated Blood Loss: Less than 10 mL      Specimens: None    Implants: None    Findings: LM normal, LAD luminal irregs, Circ mid vessel 60% (prior IFR of the lesion as normal), RCA 60% mid vessel, IFR 1.00, hemodynamically insignificant. LVEF 55-60%, MIMPL03 mmHg    Complications: None    Recommendations: Continue medical therapy.     Signed By: Michelle Lyon MD     July 15, 2019

## 2019-07-15 NOTE — DISCHARGE INSTRUCTIONS
HEART CATHETERIZATION/ANGIOGRAPHY DISCHARGE INSTRUCTIONS    1. Check puncture site frequently for swelling or bleeding. If there is any bleeding, lie down and apply pressure over the area with a clean towel or washcloth and call 911. Notify your doctor for any redness, swelling, drainage, or oozing from the puncture site. Notify your doctor for any fever or chills. 2. If the extremity becomes cold, numb, or painful call [facility] at [phone].  3. Activity should be limited for the next 48 hours. No heavy lifting (anything over 10 pounds) for 3 days. No pushing or pulling with right arm for the next three days. 4. You may resume your usual diet. Drink more fluids than usual.  5. Have a responsible person drive you home and stay with you for at least 24 hours after your heart catheterization/angiography. No pushing or pulling with right arm for the next three days. 6. You may remove bandage from your ARM in 24 hours. You may shower in 24 hours. No tub baths, hot tubs, or swimming for 1 week. Do not place any lotions, creams, powders, or ointments over puncture site for 1 week. You may place a clean band-aid over the puncture site each day for 5 days. Change daily. 7. No Metformin until Wednesday  I have read the above instructions and have had the opportunity to ask questions.       Patient: ________________________   Date: 7/15/2019    Witness: _______________________   Date: 7/15/2019

## 2019-07-15 NOTE — PROGRESS NOTES
TRANSFER - OUT REPORT:    Verbal report given to Tirso Wright RN on Francisco Hirschs  being transferred to [facility] for routine progression of care       Report consisted of patients Situation, Background, Assessment and Recommendations(SBAR). Information from the following report(s) SBAR, Kardex, Procedure Summary and MAR was reviewed with the receiving nurse. Opportunity for questions and clarification was provided.       C with Dr Drew Pennington  iFR RCA- negative  3000 additional heparin  2 versed  25 fentanyl  Right radial Rband 14ml at 1110 Detail Level: Simple Detail Level: Generalized

## 2019-07-15 NOTE — PROGRESS NOTES
Patient received to [address] [place] [address] room # 9  Ambulatory from [facility]. Patient scheduled for C today with Dr Jari Sever. Procedure reviewed & questions answered, voiced good understanding consent obtained & placed on chart. All medications and medical history reviewed. Will prep patient per orders. Patient & family updated on plan of care. The patient has a fraility score of 3-MANAGING WELL, based on ability to perform ADLs by self yet is dependent on O2 due to COPD.

## 2019-07-16 NOTE — PROCEDURES
[facility]  CARDIAC CATH    Name:  Malaika Mendez  MR#:  [id number]  :  1960  ACCOUNT #:  [de-identified]  DATE OF SERVICE:  07/15/2019      REFERRING PHYSICIAN:  Miryam Greer MD    PROCEDURES PERFORMED:  Left heart catheterization, iFR measurement of mid right coronary artery. PREOPERATIVE DIAGNOSIS:  Angina pectoris. POSTOPERATIVE DIAGNOSIS:  Noncardiac chest pain. SURGEON:  Zulema Minaya MD    ASSISTANT:  None. ESTIMATED BLOOD LOSS:  10 mL. SPECIMENS REMOVED:  None. COMPLICATIONS:  None. IMPLANTS:  None. ANESTHESIA:  The patient was given 2 mg of Versed and 25 mcg of fentanyl. Monitored for conscious sedation beginning at 10:43 and ending at 11:09 by nurse, Maggy Santizo. PROCEDURE:  After informed consent, the patient was prepped and draped in the usual sterile fashion. The right wrist was infiltrated with lidocaine. The right radial artery was accessed via the modified Seldinger technique with a 6-Ethiopian sheath. A total of 120 mL of Isovue contrast used in the entire procedure. A Terumo band was used for hemostasis. CATHETERS USED:  Included a 5-Ethiopian JL-3.5, 5-Ethiopian JR-5, 5-Ethiopian angled pigtail catheter, and a 6-Ethiopian JR-5 guide. FINDINGS:  Left ventricle:  Left ventricular ejection fraction is estimated at 55-60% with normal wall motion. LVEDP:  19 mmHg. Left main:  Normal.    Left anterior descending artery had luminal irregularities throughout without a focal stenosis greater than 30%. The circumflex artery had luminal irregularities throughout with focal stenosis of approximately 50-60% in the midvessel going into a large obtuse marginal.  This was iFR-ed the last time she had heart catheterization and was not hemodynamically significant. This lesion also did not prove to be hemodynamically significant by her stress test 1 month ago and in reviewing films it did not appear to be different than the heart cath done in 2016.     Right coronary artery had a focal stenosis of approximately 60% in the midportion of the vessel and luminal irregularities throughout. INTERVENTION:  It was decided to do an iFR measurement on the right coronary. The patient was given adequate heparin dose to achieve an ACT greater than 230. A 6-Nepali JR-5 guide was taken down to the root of the aorta and used to engage the ostium of the right coronary. A Verrata pressure wire was passed down into the distal right coronary artery and a pressure measurement was done across this lesion and it was 1.00 which is considered hemodynamically insignificant. The patient tolerated the procedure without issue. We will have her follow up in the office with Dr. Rafael Ace for ongoing management and risk factor reduction.       Adan Martin MD      [id number]  D:  07/15/2019 11:19  T:  07/15/2019 11:23  JOB #:  [id number]

## 2019-09-30 ENCOUNTER — HOSPITAL ENCOUNTER (OUTPATIENT)
Dept: CT IMAGING | Age: 59
Discharge: HOME | End: 2019-09-30
Attending: NURSE PRACTITIONER
Payer: COMMERCIAL

## 2019-09-30 VITALS — HEIGHT: 66 IN | WEIGHT: 160 LBS | BODY MASS INDEX: 25.71 KG/M2

## 2019-09-30 DIAGNOSIS — F17.219 CIGARETTE NICOTINE DEPENDENCE WITH NICOTINE-INDUCED DISORDER: ICD-10-CM

## 2019-09-30 PROCEDURE — G0297 LDCT FOR LUNG CA SCREEN: HCPCS

## 2019-09-30 NOTE — PROGRESS NOTES
Please let her know that CT shows stable small RUL nodule, other nodules have resolved. I will discuss in detail at upcoming appt.

## 2020-03-19 ENCOUNTER — APPOINTMENT (OUTPATIENT)
Dept: CT IMAGING | Age: 60
End: 2020-03-19
Attending: EMERGENCY MEDICINE
Payer: COMMERCIAL

## 2020-03-19 ENCOUNTER — APPOINTMENT (OUTPATIENT)
Dept: MRI IMAGING | Age: 60
DRG: 065 | End: 2020-03-19
Attending: INTERNAL MEDICINE
Payer: COMMERCIAL

## 2020-03-19 ENCOUNTER — HOSPITAL ENCOUNTER (EMERGENCY)
Age: 60
Discharge: OTHER INSTITUTION | End: 2020-03-19
Attending: EMERGENCY MEDICINE
Payer: COMMERCIAL

## 2020-03-19 ENCOUNTER — HOSPITAL ENCOUNTER (INPATIENT)
Age: 60
LOS: 3 days | Discharge: HOME | DRG: 065 | End: 2020-03-22
Attending: INTERNAL MEDICINE | Admitting: INTERNAL MEDICINE
Payer: COMMERCIAL

## 2020-03-19 ENCOUNTER — APPOINTMENT (OUTPATIENT)
Dept: CT IMAGING | Age: 60
DRG: 065 | End: 2020-03-19
Attending: NURSE PRACTITIONER
Payer: COMMERCIAL

## 2020-03-19 VITALS
WEIGHT: 161 LBS | RESPIRATION RATE: 18 BRPM | HEART RATE: 100 BPM | TEMPERATURE: 98 F | OXYGEN SATURATION: 94 % | BODY MASS INDEX: 25.88 KG/M2 | HEIGHT: 66 IN | SYSTOLIC BLOOD PRESSURE: 132 MMHG | DIASTOLIC BLOOD PRESSURE: 69 MMHG

## 2020-03-19 DIAGNOSIS — I63.9 CEREBROVASCULAR ACCIDENT (CVA), UNSPECIFIED MECHANISM (HCC): Primary | ICD-10-CM

## 2020-03-19 DIAGNOSIS — I63.512 CEREBROVASCULAR ACCIDENT (CVA) DUE TO STENOSIS OF LEFT MIDDLE CEREBRAL ARTERY (HCC): ICD-10-CM

## 2020-03-19 LAB
ALBUMIN SERPL-MCNC: 2.8 G/DL (ref 3.2–4.6)
ALBUMIN/GLOB SERPL: 0.8 {RATIO} (ref 1.2–3.5)
ALP SERPL-CCNC: 69 U/L (ref 50–130)
ALT SERPL-CCNC: 15 U/L (ref 12–65)
ANION GAP SERPL CALC-SCNC: 6 MMOL/L (ref 7–16)
AST SERPL-CCNC: 13 U/L (ref 15–37)
ATRIAL RATE: 106 BPM
BASOPHILS # BLD: 0.1 K/UL (ref 0–0.2)
BASOPHILS NFR BLD: 1 % (ref 0–2)
BILIRUB SERPL-MCNC: 0.2 MG/DL (ref 0.2–1.1)
BUN SERPL-MCNC: 10 MG/DL (ref 8–23)
CALCIUM SERPL-MCNC: 7.9 MG/DL (ref 8.3–10.4)
CALCULATED P AXIS, ECG09: 77 DEGREES
CALCULATED R AXIS, ECG10: 68 DEGREES
CALCULATED T AXIS, ECG11: 80 DEGREES
CHLORIDE SERPL-SCNC: 100 MMOL/L (ref 98–107)
CO2 SERPL-SCNC: 25 MMOL/L (ref 21–32)
CREAT SERPL-MCNC: 0.9 MG/DL (ref 0.6–1)
DIAGNOSIS, 93000: NORMAL
DIFFERENTIAL METHOD BLD: ABNORMAL
EOSINOPHIL # BLD: 0.1 K/UL (ref 0–0.8)
EOSINOPHIL NFR BLD: 2 % (ref 0.5–7.8)
ERYTHROCYTE [DISTWIDTH] IN BLOOD BY AUTOMATED COUNT: 13.9 % (ref 11.9–14.6)
GLOBULIN SER CALC-MCNC: 3.4 G/DL (ref 2.3–3.5)
GLUCOSE BLD STRIP.AUTO-MCNC: 108 MG/DL (ref 65–100)
GLUCOSE BLD STRIP.AUTO-MCNC: 299 MG/DL (ref 65–100)
GLUCOSE SERPL-MCNC: 296 MG/DL (ref 65–100)
HCT VFR BLD AUTO: 42.8 % (ref 35.8–46.3)
HGB BLD-MCNC: 13.9 G/DL (ref 11.7–15.4)
IMM GRANULOCYTES # BLD AUTO: 0 K/UL (ref 0–0.5)
IMM GRANULOCYTES NFR BLD AUTO: 0 % (ref 0–5)
INR PPP: 1.1
LYMPHOCYTES # BLD: 1.5 K/UL (ref 0.5–4.6)
LYMPHOCYTES NFR BLD: 20 % (ref 13–44)
MCH RBC QN AUTO: 29.8 PG (ref 26.1–32.9)
MCHC RBC AUTO-ENTMCNC: 32.5 G/DL (ref 31.4–35)
MCV RBC AUTO: 91.8 FL (ref 79.6–97.8)
MONOCYTES # BLD: 0.5 K/UL (ref 0.1–1.3)
MONOCYTES NFR BLD: 6 % (ref 4–12)
NEUTS SEG # BLD: 5.4 K/UL (ref 1.7–8.2)
NEUTS SEG NFR BLD: 71 % (ref 43–78)
NRBC # BLD: 0 K/UL (ref 0–0.2)
P-R INTERVAL, ECG05: 150 MS
PLATELET # BLD AUTO: 197 K/UL (ref 150–450)
PMV BLD AUTO: 8.5 FL (ref 9.4–12.3)
POTASSIUM SERPL-SCNC: 3.5 MMOL/L (ref 3.5–5.1)
PROT SERPL-MCNC: 6.2 G/DL (ref 6.3–8.2)
PROTHROMBIN TIME: 14.8 SEC (ref 12–14.7)
Q-T INTERVAL, ECG07: 358 MS
QRS DURATION, ECG06: 74 MS
QTC CALCULATION (BEZET), ECG08: 475 MS
RBC # BLD AUTO: 4.66 M/UL (ref 4.05–5.2)
SODIUM SERPL-SCNC: 131 MMOL/L (ref 136–145)
TROPONIN I SERPL-MCNC: <0.02 NG/ML (ref 0.02–0.05)
VENTRICULAR RATE, ECG03: 106 BPM
WBC # BLD AUTO: 7.6 K/UL (ref 4.3–11.1)

## 2020-03-19 PROCEDURE — 70551 MRI BRAIN STEM W/O DYE: CPT

## 2020-03-19 PROCEDURE — 70450 CT HEAD/BRAIN W/O DYE: CPT

## 2020-03-19 PROCEDURE — 70496 CT ANGIOGRAPHY HEAD: CPT

## 2020-03-19 PROCEDURE — 74011000258 HC RX REV CODE- 258: Performed by: EMERGENCY MEDICINE

## 2020-03-19 PROCEDURE — 74011000250 HC RX REV CODE- 250: Performed by: INTERNAL MEDICINE

## 2020-03-19 PROCEDURE — 94762 N-INVAS EAR/PLS OXIMTRY CONT: CPT

## 2020-03-19 PROCEDURE — 85025 COMPLETE CBC W/AUTO DIFF WBC: CPT

## 2020-03-19 PROCEDURE — 74011636320 HC RX REV CODE- 636/320: Performed by: EMERGENCY MEDICINE

## 2020-03-19 PROCEDURE — 85610 PROTHROMBIN TIME: CPT

## 2020-03-19 PROCEDURE — 99283 EMERGENCY DEPT VISIT LOW MDM: CPT

## 2020-03-19 PROCEDURE — 74011250637 HC RX REV CODE- 250/637: Performed by: INTERNAL MEDICINE

## 2020-03-19 PROCEDURE — 77030040361 HC SLV COMPR DVT MDII -B

## 2020-03-19 PROCEDURE — 84484 ASSAY OF TROPONIN QUANT: CPT

## 2020-03-19 PROCEDURE — 77030040393 HC DRSG OPTIFOAM GENT MDII -B

## 2020-03-19 PROCEDURE — 80053 COMPREHEN METABOLIC PANEL: CPT

## 2020-03-19 PROCEDURE — 74011000250 HC RX REV CODE- 250: Performed by: EMERGENCY MEDICINE

## 2020-03-19 PROCEDURE — 0042T CT PERF W CBF: CPT

## 2020-03-19 PROCEDURE — 74011636637 HC RX REV CODE- 636/637: Performed by: INTERNAL MEDICINE

## 2020-03-19 PROCEDURE — 82962 GLUCOSE BLOOD TEST: CPT

## 2020-03-19 PROCEDURE — 99285 EMERGENCY DEPT VISIT HI MDM: CPT

## 2020-03-19 PROCEDURE — 65610000001 HC ROOM ICU GENERAL

## 2020-03-19 PROCEDURE — 74011250636 HC RX REV CODE- 250/636: Performed by: EMERGENCY MEDICINE

## 2020-03-19 PROCEDURE — 93005 ELECTROCARDIOGRAM TRACING: CPT | Performed by: EMERGENCY MEDICINE

## 2020-03-19 PROCEDURE — 74011000258 HC RX REV CODE- 258: Performed by: NURSE PRACTITIONER

## 2020-03-19 PROCEDURE — 37195 THROMBOLYTIC THERAPY STROKE: CPT

## 2020-03-19 PROCEDURE — 94640 AIRWAY INHALATION TREATMENT: CPT

## 2020-03-19 PROCEDURE — 74011636320 HC RX REV CODE- 636/320: Performed by: NURSE PRACTITIONER

## 2020-03-19 RX ORDER — ISOSORBIDE MONONITRATE 30 MG/1
120 TABLET, EXTENDED RELEASE ORAL DAILY
Status: DISCONTINUED | OUTPATIENT
Start: 2020-03-20 | End: 2020-03-22 | Stop reason: HOSPADM

## 2020-03-19 RX ORDER — NITROGLYCERIN 0.4 MG/1
0.4 TABLET SUBLINGUAL
Status: CANCELLED | OUTPATIENT
Start: 2020-03-19

## 2020-03-19 RX ORDER — NITROGLYCERIN 0.4 MG/1
0.4 TABLET SUBLINGUAL
Status: DISCONTINUED | OUTPATIENT
Start: 2020-03-19 | End: 2020-03-22 | Stop reason: HOSPADM

## 2020-03-19 RX ORDER — LORAZEPAM 0.5 MG/1
0.5 TABLET ORAL
Status: CANCELLED | OUTPATIENT
Start: 2020-03-19

## 2020-03-19 RX ORDER — DILTIAZEM HYDROCHLORIDE 120 MG/1
240 CAPSULE, COATED, EXTENDED RELEASE ORAL DAILY
Status: DISCONTINUED | OUTPATIENT
Start: 2020-03-20 | End: 2020-03-22 | Stop reason: HOSPADM

## 2020-03-19 RX ORDER — SODIUM CHLORIDE 0.9 % (FLUSH) 0.9 %
5-40 SYRINGE (ML) INJECTION AS NEEDED
Status: DISCONTINUED | OUTPATIENT
Start: 2020-03-19 | End: 2020-03-22 | Stop reason: HOSPADM

## 2020-03-19 RX ORDER — PRAVASTATIN SODIUM 20 MG/1
20 TABLET ORAL
Status: DISCONTINUED | OUTPATIENT
Start: 2020-03-19 | End: 2020-03-21

## 2020-03-19 RX ORDER — SODIUM CHLORIDE 9 MG/ML
50 INJECTION, SOLUTION INTRAVENOUS ONCE
Status: COMPLETED | OUTPATIENT
Start: 2020-03-19 | End: 2020-03-19

## 2020-03-19 RX ORDER — ONDANSETRON 2 MG/ML
4 INJECTION INTRAMUSCULAR; INTRAVENOUS
Status: DISCONTINUED | OUTPATIENT
Start: 2020-03-19 | End: 2020-03-22 | Stop reason: HOSPADM

## 2020-03-19 RX ORDER — BUDESONIDE 0.5 MG/2ML
500 INHALANT ORAL 2 TIMES DAILY
Status: CANCELLED | OUTPATIENT
Start: 2020-03-19

## 2020-03-19 RX ORDER — DILTIAZEM HYDROCHLORIDE 240 MG/1
240 CAPSULE, COATED, EXTENDED RELEASE ORAL DAILY
Status: CANCELLED | OUTPATIENT
Start: 2020-03-20

## 2020-03-19 RX ORDER — BENZONATATE 100 MG/1
100 CAPSULE ORAL
Status: CANCELLED | OUTPATIENT
Start: 2020-03-19

## 2020-03-19 RX ORDER — CITALOPRAM 10 MG/1
40 TABLET ORAL DAILY
Status: CANCELLED | OUTPATIENT
Start: 2020-03-20

## 2020-03-19 RX ORDER — MECLIZINE HYDROCHLORIDE 25 MG/1
25 TABLET ORAL
Status: CANCELLED | OUTPATIENT
Start: 2020-03-19

## 2020-03-19 RX ORDER — SODIUM CHLORIDE 0.9 % (FLUSH) 0.9 %
10 SYRINGE (ML) INJECTION
Status: COMPLETED | OUTPATIENT
Start: 2020-03-19 | End: 2020-03-19

## 2020-03-19 RX ORDER — PRAVASTATIN SODIUM 20 MG/1
20 TABLET ORAL
Status: CANCELLED | OUTPATIENT
Start: 2020-03-19

## 2020-03-19 RX ORDER — CITALOPRAM 20 MG/1
40 TABLET, FILM COATED ORAL DAILY
Status: DISCONTINUED | OUTPATIENT
Start: 2020-03-20 | End: 2020-03-22 | Stop reason: HOSPADM

## 2020-03-19 RX ORDER — MECLIZINE HYDROCHLORIDE 25 MG/1
25 TABLET ORAL
Status: DISCONTINUED | OUTPATIENT
Start: 2020-03-19 | End: 2020-03-22 | Stop reason: HOSPADM

## 2020-03-19 RX ORDER — INSULIN LISPRO 100 [IU]/ML
INJECTION, SOLUTION INTRAVENOUS; SUBCUTANEOUS
Status: DISCONTINUED | OUTPATIENT
Start: 2020-03-19 | End: 2020-03-22 | Stop reason: HOSPADM

## 2020-03-19 RX ORDER — ISOSORBIDE MONONITRATE 60 MG/1
120 TABLET, EXTENDED RELEASE ORAL DAILY
Status: CANCELLED | OUTPATIENT
Start: 2020-03-20

## 2020-03-19 RX ORDER — BENZONATATE 100 MG/1
100 CAPSULE ORAL
Status: DISCONTINUED | OUTPATIENT
Start: 2020-03-19 | End: 2020-03-22 | Stop reason: HOSPADM

## 2020-03-19 RX ORDER — BUDESONIDE 0.5 MG/2ML
500 INHALANT ORAL 2 TIMES DAILY
Status: DISCONTINUED | OUTPATIENT
Start: 2020-03-19 | End: 2020-03-20

## 2020-03-19 RX ORDER — SODIUM CHLORIDE 0.9 % (FLUSH) 0.9 %
5-40 SYRINGE (ML) INJECTION EVERY 8 HOURS
Status: DISCONTINUED | OUTPATIENT
Start: 2020-03-19 | End: 2020-03-22 | Stop reason: HOSPADM

## 2020-03-19 RX ORDER — DEXTROSE 50 % IN WATER (D50W) INTRAVENOUS SYRINGE
12.5
Status: ACTIVE | OUTPATIENT
Start: 2020-03-19 | End: 2020-03-20

## 2020-03-19 RX ORDER — LORAZEPAM 0.5 MG/1
0.5 TABLET ORAL 2 TIMES DAILY
Status: DISCONTINUED | OUTPATIENT
Start: 2020-03-19 | End: 2020-03-22 | Stop reason: HOSPADM

## 2020-03-19 RX ADMIN — SODIUM CHLORIDE 50 ML: 900 INJECTION, SOLUTION INTRAVENOUS at 13:17

## 2020-03-19 RX ADMIN — ALTEPLASE 6.57 MG: KIT at 12:16

## 2020-03-19 RX ADMIN — SODIUM CHLORIDE 100 ML: 900 INJECTION, SOLUTION INTRAVENOUS at 15:18

## 2020-03-19 RX ADMIN — Medication 10 ML: at 18:05

## 2020-03-19 RX ADMIN — INSULIN LISPRO 6 UNITS: 100 INJECTION, SOLUTION INTRAVENOUS; SUBCUTANEOUS at 16:29

## 2020-03-19 RX ADMIN — BUDESONIDE 500 MCG: 0.5 INHALANT RESPIRATORY (INHALATION) at 19:11

## 2020-03-19 RX ADMIN — ALTEPLASE 59.13 MG: KIT at 12:17

## 2020-03-19 RX ADMIN — SODIUM CHLORIDE 100 ML: 900 INJECTION, SOLUTION INTRAVENOUS at 11:29

## 2020-03-19 RX ADMIN — PRAVASTATIN SODIUM 20 MG: 20 TABLET ORAL at 21:45

## 2020-03-19 RX ADMIN — LORAZEPAM 0.5 MG: 0.5 TABLET ORAL at 18:05

## 2020-03-19 RX ADMIN — IOPAMIDOL 100 ML: 755 INJECTION, SOLUTION INTRAVENOUS at 11:29

## 2020-03-19 RX ADMIN — Medication 10 ML: at 15:18

## 2020-03-19 RX ADMIN — Medication 10 ML: at 11:29

## 2020-03-19 RX ADMIN — IOPAMIDOL 100 ML: 755 INJECTION, SOLUTION INTRAVENOUS at 15:18

## 2020-03-19 RX ADMIN — Medication 10 ML: at 21:45

## 2020-03-19 NOTE — ED NOTES
TRANSFER - OUT REPORT:    Verbal report given to RN Loulou Valentin on Dallis Claude  being transferred to [facility] ER for urgent transfer       Report consisted of patients Situation, Background, Assessment and   Recommendations(SBAR). Information from the following report(s) SBAR, ED Summary, MAR and Cardiac Rhythm NSR was reveiwed with the receiving nurse. Opportunity for questions and clarification was provided.       Ischemic Stroke with Activase    Activase Date and Time of Administration: 1216    BP Parameters: Less Than 180/105    Controlled With: None    Dysphagia Screen Completed: Yes: Pass  Dysphagia Screening  Vocal Quality/Secretions: Normal  History of Dysphagia: No  O2 Saturation: Normal  Alertness: Normal  Pre-Swallow Assessment Score: 0  Purees: No difficulty noted  Water by Cup: No difficulty noted  Water by Straw: No difficulty noted     NIH Stroke Scale Complete: Yes: 7    Frequency of Vital Signs: Every 2 hours    Frequency of Neuro Checks: Every 2 hours

## 2020-03-19 NOTE — H&P
Hospitalist Note     Admit Date:  3/19/2020  3:03 PM   Name:  Annalise Moran   Age:  61 y.o.  :  1960   MRN:  [id number]   PCP:  Samuel Griffith MD  Treatment Team: Attending Provider: Jessie Galo MD; Nurse Practitioner: Lila Koroma NP; Primary Nurse: Jennifer Bedoya    HPI/Subjective:   Patient is a 57-year-old female with a past medical history of COPD (supposed be on 2 L oxygen at home), fibromyalgia, tobacco abuse, hyperlipidemia, IBS, HTN, diabetes mellitus, CAD, and several back surgeries who presents to the emergency department with right facial droop and dysarthria. Patient reportedly started having symptoms between 1030 and 11 AM.  Patient reports her daughter noticed a right facial droop. This progressed to right arm weakness and right leg weakness. Patient still had symptoms on arrival to emergency department. There was delay and evaluation by tele-neurology. Stat head CT was negative for acute pathology. Stat head CTA showed High-grade stenosis versus occlusion involving the anterior M2 branch of the left MCA. Neuro interventionalists did not recommend intervention. Patient was recommended as TPA candidate. Patient was seen and examined during TPA infusion. Patient at that time reported improvement in facial droop and dysarthria. She denied any chest pain, palpitations, or shortness of breath. She denied any headache, dizziness, lightheadedness, changes in vision, or hearing difficulty. Patient examined again 10 to 15 minutes later with worsening of symptoms. Discussed with neurosurgery who recommended transfer to [address] [place] [address] ED after repeat CT to evaluate for bleed. Patient was seen and examined at [facility] [address] ED. 10 systems reviewed and negative except as noted in HPI.   Past Medical History:   Diagnosis Date    Acute renal failure ([address] 2013    Baseline creatinine was 0.8, and currently it is 2.9     Acute respiratory failure ([address] 12/5/2013    Adverse effect of anesthesia     difficulty waking up    Arthritis     Back pain     CAD (coronary artery disease)     CAP (community acquired pneumonia) 12/7/2013    COPD exacerbation ([address] 6/8/2017    Oxygen at 3lpm continuously    CVA (cerebral vascular accident) ([address] 3/19/2020    Cystitis     Diabetes ([address]     Difficult intubation 2005    with spine surgery at [facility]    Hypertension     Hypoproteinemia ([address] 12/11/2013    Hypotension 12/5/2013    Hypoxemia 12/20/2013    follow up overnight oximetry on room air 3/2014 - resolved.  Ill-defined condition     hx of IC     Migraine headache     Myalgia     Pleural effusion 12/17/2013    Left: 450 ml of fluid was obtained       Positive occult stool blood test 12/9/2013    Psychiatric disorder     Respiratory failure ([address] 12/2013    required intubation    Sepsis ([address] 12/7/2013    Septicemia ([address] Dec 2013    no BP, pneumonia, \"Everything was failing\"- on vent 12/6-12/17    Unspecified adverse effect of anesthesia     took a long time to wake up 2005    Upper GI bleed 12/10/2013    GI evaluated       Past Surgical History:   Procedure Laterality Date    HX CHOLECYSTECTOMY      HX HEART CATHETERIZATION  2011    Mild CAD per Dr Rome Banks  12/29/2016    LAD:30% stenosis with \"sluggish flow\",Dx:30% stenosis. LCX OM:40-50% stenosis with - FFR,Small accesssory OM:70% ostial stenosis in 1 mmvessel,and RCA:30% diffuse CAD.  HX HEART CATHETERIZATION  07/15/2019    LV:EF55-60%. LM:nl. LAD:<30% stenosis. mLCX:50-60% stenosis. mRCA:60% stenosis with normal IFR (1.0) and irregularities    HX HYSTERECTOMY      hyesterectomy    HX ORTHOPAEDIC      4 back surgeries    HX ORTHOPAEDIC Right 10/2014    elbow    NEUROLOGICAL PROCEDURE UNLISTED  2000 & 2005    spine X 4      Allergies   Allergen Reactions    Latex Other (comments)    Latex, Natural Rubber Itching     hives    Anoro Ellipta [Umeclidinium-Vilanterol] Other (comments)     Chest pain    Egg Itching     Itching with some tongue swelling and nausea    Avelox [Moxifloxacin] Swelling    Banana Swelling    Moxifloxacin Hcl Other (comments)    Sudafed [Pseudoephedrine Hcl] Palpitations    Valium [Diazepam] Other (comments)     angry    Watermelon Swelling      Social History     Tobacco Use    Smoking status: Current Every Day Smoker     Packs/day: 1.00     Years: 41.00     Pack years: 41.00     Types: Cigarettes    Smokeless tobacco: Never Used    Tobacco comment: currently smoking 1/2 or less   Substance Use Topics    Alcohol use: Yes     Alcohol/week: 0.0 standard drinks     Comment: RARE      Family History   Problem Relation Age of Onset    Cancer Mother         colon    Alzheimer Mother     Heart Disease Father       Immunization History   Administered Date(s) Administered    Influenza Vaccine (Quadrivalent) 10/22/2018    Pneumococcal Conjugate (PCV-13) 2015    Pneumococcal Polysaccharide (PPSV-23) 2014     PTA Medications:  Prior to Admission Medications   Prescriptions Last Dose Informant Patient Reported? Taking? LORazepam (ATIVAN) 0.5 mg tablet   No No   Sig: Take one tablet three times a day when needed. OXYGEN-AIR DELIVERY SYSTEMS   Yes No   Sig: 3 lpm AM and 2 lpm PM   albuterol (PROAIR HFA) 90 mcg/actuation inhaler   No No   Si puffs 4 times daily prn   albuterol-ipratropium (DUO-NEB) 2.5 mg-0.5 mg/3 ml nebu   No No   Si vial via nebulizer qid. Indications: Bronchi Muscle Spasm resulting from COPD   amoxicillin-clavulanate (AUGMENTIN) 875-125 mg per tablet   No No   Sig: Take 1 Tab by mouth every twelve (12) hours. aspirin delayed-release 81 mg tablet   No No   Sig: Take 1 Tab by mouth daily. benzonatate (TESSALON PERLES) 100 mg capsule   Yes No   Sig: Take 100 mg by mouth three (3) times daily as needed for Cough.    budesonide (PULMICORT) 0.5 mg/2 mL nbsp   No No   Si mL by Nebulization route two (2) times a day. Dx: COPD J44.9, Bill to medicare part B.   cholecalciferol, vitamin D3, (VITAMIN D3) 2,000 unit tab   Yes No   Sig: Take  by mouth daily. citalopram (CELEXA) 40 mg tablet   No No   Sig: TAKE ONE TABLET BY MOUTH ONCE DAILY. dilTIAZem CD (CARDIZEM CD) 240 mg ER capsule   No No   Sig: Take 1 Cap by mouth daily. isosorbide mononitrate ER (IMDUR) 120 mg CR tablet   No No   Sig: TAKE ONE TABLET BY MOUTH EVERY MORNING   meclizine (ANTIVERT) 25 mg tablet   No No   Sig: Take 1 Tab by mouth three (3) times daily as needed. Indications: VERTIGO   metFORMIN (GLUCOPHAGE) 500 mg tablet   No No   Sig: TAKE ONE TABLET BY MOUTH DAILY WITH BREAKFAST   nitroglycerin (NITROSTAT) 0.4 mg SL tablet   No No   Si Tab by SubLINGual route every five (5) minutes as needed for Chest Pain. Up to 3 tablets in 15min   pravastatin (PRAVACHOL) 20 mg tablet   No No   Sig: TAKE ONE TABLET BY MOUTH ONCE DAILY. predniSONE (DELTASONE) 10 mg tablet   No No   Sig: 3 po day 1, 2 po day 2 and 3, 1 po day 4 and 5 and 1/2 po day 6 and 7      Facility-Administered Medications: None       Objective:     Patient Vitals for the past 24 hrs:   Temp Pulse Resp BP SpO2   20 1506 98.2 °F (36.8 °C) 99 18 112/72 96 %     Oxygen Therapy  O2 Sat (%): 96 % (20 1506)  O2 Device: Room air (20 1506)    Estimated body mass index is 25.99 kg/m² as calculated from the following:    Height as of this encounter: 5' 6\" (1.676 m). Weight as of this encounter: 73 kg (161 lb). No intake or output data in the 24 hours ending 20 1610    *Note that automatically entered I/Os may not be accurate; dependent on patient compliance with collection and accurate  by assistants.     Physical Exam:  General: Female no acute distress  Eyes: PERRLA, EOMI, nonicteric  ENT: Moist mucous membranes  Cardiovascular: RRR, no significant rubs or murmurs appreciated  Respiratory: No wheezes, rales, or rhonchi; clear to auscultation bilaterally  Gastrointestinal: Soft, nontender, nondistended, bowel sounds active  Genitourinary: No suprapubic tenderness  Musculoskeletal: No joint swelling appreciated  Skin: No lesions on examined area  Neurological: Alert and oriented, right-sided facial droop, decreased strength of RUE and RLE  Psychiatric: Normal mood and affect    I reviewed the labs, imaging, EKGs, telemetry, and other studies done this admission. Data Review:   Recent Results (from the past 24 hour(s))   CBC WITH AUTOMATED DIFF    Collection Time: 03/19/20 11:35 AM   Result Value Ref Range    WBC 7.6 4.3 - 11.1 K/uL    RBC 4.66 4.05 - 5.2 M/uL    HGB 13.9 11.7 - 15.4 g/dL    HCT 42.8 35.8 - 46.3 %    MCV 91.8 79.6 - 97.8 FL    MCH 29.8 26.1 - 32.9 PG    MCHC 32.5 31.4 - 35.0 g/dL    RDW 13.9 11.9 - 14.6 %    PLATELET 994 514 - 703 K/uL    MPV 8.5 (L) 9.4 - 12.3 FL    ABSOLUTE NRBC 0.00 0.0 - 0.2 K/uL    DF AUTOMATED      NEUTROPHILS 71 43 - 78 %    LYMPHOCYTES 20 13 - 44 %    MONOCYTES 6 4.0 - 12.0 %    EOSINOPHILS 2 0.5 - 7.8 %    BASOPHILS 1 0.0 - 2.0 %    IMMATURE GRANULOCYTES 0 0.0 - 5.0 %    ABS. NEUTROPHILS 5.4 1.7 - 8.2 K/UL    ABS. LYMPHOCYTES 1.5 0.5 - 4.6 K/UL    ABS. MONOCYTES 0.5 0.1 - 1.3 K/UL    ABS. EOSINOPHILS 0.1 0.0 - 0.8 K/UL    ABS. BASOPHILS 0.1 0.0 - 0.2 K/UL    ABS. IMM. GRANS. 0.0 0.0 - 0.5 K/UL   METABOLIC PANEL, COMPREHENSIVE    Collection Time: 03/19/20 11:35 AM   Result Value Ref Range    Sodium 131 (L) 136 - 145 mmol/L    Potassium 3.5 3.5 - 5.1 mmol/L    Chloride 100 98 - 107 mmol/L    CO2 25 21 - 32 mmol/L    Anion gap 6 (L) 7 - 16 mmol/L    Glucose 296 (H) 65 - 100 mg/dL    BUN 10 8 - 23 MG/DL    Creatinine 0.90 0.6 - 1.0 MG/DL    GFR est AA >60 >60 ml/min/1.73m2    GFR est non-AA >60 >60 ml/min/1.73m2    Calcium 7.9 (L) 8.3 - 10.4 MG/DL    Bilirubin, total 0.2 0.2 - 1.1 MG/DL    ALT (SGPT) 15 12 - 65 U/L    AST (SGOT) 13 (L) 15 - 37 U/L    Alk.  phosphatase 69 50 - 130 U/L    Protein, total 6.2 (L) 6.3 - 8.2 g/dL    Albumin 2.8 (L) 3.2 - 4.6 g/dL    Globulin 3.4 2.3 - 3.5 g/dL    A-G Ratio 0.8 (L) 1.2 - 3.5     PROTHROMBIN TIME + INR    Collection Time: 03/19/20 11:35 AM   Result Value Ref Range    Prothrombin time 14.8 (H) 12.0 - 14.7 sec    INR 1.1     TROPONIN I    Collection Time: 03/19/20 11:35 AM   Result Value Ref Range    Troponin-I, Qt. <0.02 (L) 0.02 - 0.05 NG/ML   GLUCOSE, POC    Collection Time: 03/19/20 11:43 AM   Result Value Ref Range    Glucose (POC) 299 (H) 65 - 100 mg/dL   EKG, 12 LEAD, INITIAL    Collection Time: 03/19/20 11:46 AM   Result Value Ref Range    Ventricular Rate 106 BPM    Atrial Rate 106 BPM    P-R Interval 150 ms    QRS Duration 74 ms    Q-T Interval 358 ms    QTC Calculation (Bezet) 475 ms    Calculated P Axis 77 degrees    Calculated R Axis 68 degrees    Calculated T Axis 80 degrees    Diagnosis       !! AGE AND GENDER SPECIFIC ECG ANALYSIS !! Sinus tachycardia  Possible Left atrial enlargement  Borderline ECG  When compared with ECG of 15-JUL-2019 07:55,  Vent. rate has increased BY  42 BPM  T wave inversion less evident in Anterior leads  Confirmed by ASHA SANTOS (), Trisha Nicholas ([id number]) on 3/19/2020 1:55:55 PM         All Micro Results     None          No current facility-administered medications for this encounter. Current Outpatient Medications   Medication Sig    amoxicillin-clavulanate (AUGMENTIN) 875-125 mg per tablet Take 1 Tab by mouth every twelve (12) hours.  predniSONE (DELTASONE) 10 mg tablet 3 po day 1, 2 po day 2 and 3, 1 po day 4 and 5 and 1/2 po day 6 and 7    benzonatate (TESSALON PERLES) 100 mg capsule Take 100 mg by mouth three (3) times daily as needed for Cough.  dilTIAZem CD (CARDIZEM CD) 240 mg ER capsule Take 1 Cap by mouth daily.  albuterol (PROAIR HFA) 90 mcg/actuation inhaler 2 puffs 4 times daily prn    albuterol-ipratropium (DUO-NEB) 2.5 mg-0.5 mg/3 ml nebu 1 vial via nebulizer qid.   Indications: Bronchi Muscle Spasm resulting from COPD    LORazepam (ATIVAN) 0.5 mg tablet Take one tablet three times a day when needed.  citalopram (CELEXA) 40 mg tablet TAKE ONE TABLET BY MOUTH ONCE DAILY.  pravastatin (PRAVACHOL) 20 mg tablet TAKE ONE TABLET BY MOUTH ONCE DAILY.  metFORMIN (GLUCOPHAGE) 500 mg tablet TAKE ONE TABLET BY MOUTH DAILY WITH BREAKFAST    isosorbide mononitrate ER (IMDUR) 120 mg CR tablet TAKE ONE TABLET BY MOUTH EVERY MORNING    cholecalciferol, vitamin D3, (VITAMIN D3) 2,000 unit tab Take  by mouth daily.  nitroglycerin (NITROSTAT) 0.4 mg SL tablet 1 Tab by SubLINGual route every five (5) minutes as needed for Chest Pain. Up to 3 tablets in 15min    budesonide (PULMICORT) 0.5 mg/2 mL nbsp 2 mL by Nebulization route two (2) times a day. Dx: COPD J44.9, Bill to medicare part B.    OXYGEN-AIR DELIVERY SYSTEMS 3 lpm AM and 2 lpm PM    meclizine (ANTIVERT) 25 mg tablet Take 1 Tab by mouth three (3) times daily as needed. Indications: VERTIGO    aspirin delayed-release 81 mg tablet Take 1 Tab by mouth daily. Other Studies:  Ct Head Wo Cont    Result Date: 3/19/2020  Head CT INDICATION: Left-sided weakness, increasing symptoms post TPA administration Multiple axial images obtained through the brain without intravenous contrast. Radiation dose reduction techniques were used for this study: All CT scans performed at this facility use one or all of the following: Automated exposure control, adjustment of the mA and/or kVp according to patient's size, iterative reconstruction. Compared with earlier study. FINDINGS: No areas of abnormal attenuation are seen in the brain. There is no CT evidence of acute hemorrhage or infarction. The ventricles are normal in size. There are no extra-axial fluid collections. No masses are seen. The sinuses are clear. There are no bony lesions. IMPRESSION: No CT evidence of acute intracranial abnormality.      Ct Perf W Cbf    Result Date: 3/19/2020  EXAMINATION: CT PERFUSION DATE: 3/19/2020 3:26 PM INDICATION: : change neurological symptoms after TPA administration COMPARISON: Same date at 11:30 TECHNIQUE: CT perfusion of the brain was obtained after the administration of intravenous contrast. Perfusion maps and perfusion analysis output were generated using the [employer] perfusion processing software algorithm. Radiation dose reduction techniques were used for this study: All CT scans performed at this facility use one or all of the following: Automated exposure control, adjustment of the mA and/or kVp according to patient's size, iterative reconstruction. FINDINGS:    Again noted is a perfusion abnormality corresponding to the left MCA territory including an area of diminished cerebral blood flow now measuring 2 mL compared to 9 mL on the previous study and a surrounding area of elevated T max measuring 36 mL currently compared to 100 mL of the previous study. Perfusion Output Values: CBF < 30% volume (best correlation with core infarct volume without overcalls): 2 ml (core infarction volume greater than 50 cc associated with poor outcomes) Tmax > 6 seconds: 36 ml Tmax/CBF Mismatch Volume: 34 ml Tmax/CBF Mismatch Ratio: 18 Tmax > 10 seconds: 0 ml Hypoperfusion index: 0.0. IMPRESSION: Core infarct in the left MCA territory with surrounding ischemic penumbra. Volume of the core infarct and ischemic penumbra has decreased slightly compared to the perfusion study earlier on the same day. Please note that the determination of patient treatment is not based solely upon imaging factors or calculation values. Management of ischemia is at the discretion of the primary physician and is based upon a combination of clinical and imaging data, along other factors.       Ct Perf W Cbf    Result Date: 3/19/2020  EXAMINATION: CT PERFUSION DATE: 3/19/2020 11:39 AM INDICATION: : Code Neuro; no additional history provided COMPARISON: Head CT and CT angiogram from the same day TECHNIQUE: CT perfusion of the brain was obtained after the administration of intravenous contrast. Perfusion maps and perfusion analysis output were generated using the "[employer] MtoV perfusion processing software algorithm. Radiation dose reduction techniques were used for this study: All CT scans performed at this facility use one or all of the following: Automated exposure control, adjustment of the mA and/or kVp according to patient's size, iterative reconstruction. FINDINGS:    There is an area of diminished cerebral blood flow in the left MCA territory measuring 9 mL. There is a corresponding larger ischemic penumbra measuring 100 mL. Perfusion Output Values: CBF < 30% volume (best correlation with core infarct volume without overcalls): 9 ml (core infarction volume greater than 50 cc associated with poor outcomes) Tmax > 6 seconds: 100 ml Tmax/CBF Mismatch Volume: 91 ml Tmax/CBF Mismatch Ratio: 11 Hypoperfusion Intensity Ratio (Tmax > 10 seconds / Tmax > 6 seconds): 0.4 (values greater than 0.5 associated with poor outcome) Tmax > 10 seconds Volume: 44 ml (volume greater than 100 mL is associated with poor outcome)     IMPRESSION:   Core infarct in the left MCA territory with larger surrounding ischemic penumbra. Please note that the determination of patient treatment is not based solely upon imaging factors or calculation values. Management of ischemia is at the discretion of the primary physician and is based upon a combination of clinical and imaging data, along other factors. Cta Code Neuro Head And Neck W Cont    Result Date: 3/19/2020  Title:  CT arteriogram of the neck and head. Indication: Cerebrovascular accident (CVA). . Technique: Axial images of the neck and head were obtained after the uneventful administration of intravenous iodinated contrast media. Contrast was used to best identify the arterial structures.   Images were reviewed on a separate, free standing, three-dimensional workstation as per the referring physicians request.  All stenosis percentages derived by comparing the narrowest segment with the distal Internal Carotid Artery luminal diameter, as described in the [employer] Symptomatic Carotid Endarterectomy Trial (NASCET) criteria. All CT scans at this facility are performed using dose reduction/dose modulation techniques, as appropriate the performed exam, including the following: Automated Exposure Control; Adjustment of the mA and/or kV according to patient size (this includes techniques or standardized protocols for targeted exams where dose is matched to indication/reason for exam); and Use of Iterative Reconstruction Technique. Comparison: CTA performed earlier in the day. Findings: Brain: No midline shift or mass effect. No enhancing mass lesion. Lungs:  Multiple small solid pulmonary nodules are again seen as detailed on the recent chest CT report. Background of moderate centrilobular emphysema. Secretions are present within the trachea and mainstem bronchi, consistent with aspiration. Bones:  No osseous destruction. . Moderate multilevel degenerative changes with facet acropathy in the cervical spine. Probable small exophytic osteoma along the left occiput. Degenerative disc disease with anterior osteophyte formation is present at C6/C7. Paranasal sinuses:  Paranasal sinuses are clear. .  Brain:  No acute intracranial abnormality. No mass lesion or hydrocephalus. .  Soft tissues:  A 1.3 cm nodule is present in the right thyroid lobe. .  Dural venous sinuses:  Patent. Aortic arch:  Nonaneurysmal. Mild to moderate atherosclerosis. .  Right brachiocephalic artery:  No significant stenosis or occlusion. .  . Right subclavian artery:  No significant stenosis or occlusion. .  . Left subclavian artery:  No significant stenosis or occlusion. .  . Right common carotid artery:  No significant stenosis or occlusion. .  . Right external carotid artery:  No significant stenosis or occlusion. .  .   Right internal carotid artery:  No significant stenosis or occlusion. .  . Mild to moderate atherosclerosis of the carotid bulb. Left common carotid artery: No significant stenosis or occlusion. .  . Left external carotid artery:  No significant stenosis or occlusion. .  . Left internal carotid artery:  No significant stenosis or occlusion. . Mild to moderate atherosclerosis of the carotid bulb. Right vertebral artery:  No significant stenosis or occlusion. .. Left vertebral artery:  No significant stenosis or occlusion. . Basilar artery:  No significant stenosis, occlusion, or aneurysm. .  Right middle cerebral artery:  No significant stenosis, occlusion, or aneurysm. Right anterior cerebral artery:  No significant stenosis, occlusion, or aneurysm. [facility] Anterior communicating artery: No significant stenosis, occlusion, or aneurysm. [facility] Left middle cerebral artery:  No significant stenosis, occlusion, or aneurysm. . High-grade stenosis versus occlusion involving the anterior M2 branch at the left MCA bifurcation. Similar to the prior exam. Distal branches do opacify with contrast distally. Left anterior cerebral artery:  No significant stenosis, occlusion, or aneurysm. .  Right posterior communicating artery: No significant stenosis, occlusion, or aneurysm. [facility] Left posterior communicating artery:  No significant stenosis, occlusion, or aneurysm. .  Right posterior cerebral artery:  No significant stenosis, occlusion, or aneurysm. [facility] Left posterior cerebral artery:  No significant stenosis, occlusion, or aneurysm. .      Impression: 1. High-grade stenosis versus occlusion involving the anterior M2 branch at the left MCA bifurcation. Similar to the prior exam. 2.  Multiple small pulmonary nodules, new compared to the prior CT 9/30/2019. Background of emphysema. Chest CT is recommended to further evaluate for primary lung malignancy and metastatic disease.  3.  Secretions are present within the trachea and mainstem bronchi, consistent with aspiration. Cta Code Neuro Head And Neck W Cont    Result Date: 3/19/2020  Title:  CT arteriogram of the neck and head. Indication: Strokelike symptoms. . Technique: Axial images of the neck and head were obtained after the uneventful administration of intravenous iodinated contrast media. Contrast was used to best identify the arterial structures. Images were reviewed on a separate, free standing, three-dimensional workstation as per the referring physicians request.  All stenosis percentages derived by comparing the narrowest segment with the distal Internal Carotid Artery luminal diameter, as described in the [employer] Symptomatic Carotid Endarterectomy Trial (NASCET) criteria. All CT scans at this facility are performed using dose reduction/dose modulation techniques, as appropriate the performed exam, including the following: Automated Exposure Control; Adjustment of the mA and/or kV according to patient size (this includes techniques or standardized protocols for targeted exams where dose is matched to indication/reason for exam); and Use of Iterative Reconstruction Technique. Comparison: CT 9/30/2019. Mliss Aragon Findings: Brain: No enhancing mass lesion. No midline shift or mass effect. Lungs:  Emphysematous changes are present. Multiple small solid pulmonary nodules, new compared to the prior CT 9/30/2019. The largest of these measures 7 mm and is present in the superior aspect of the right lower lobe. Bones:  No osseous destruction. . Facet arthropathy is present in the cervical spine. Degenerative disc disease with anterior osteophyte formation is present at C6-C7. Paranasal sinuses:  Paranasal sinuses are clear. .  Brain:  No acute intracranial abnormality. No mass lesion or hydrocephalus. .  Soft tissues: There is a 1.3 cm nodule in the right thyroid lobe. .  Dural venous sinuses:  Patent. Aortic arch:  Mild calcific atherosclerosis.  Nonaneurysmal..  Right brachiocephalic artery:  No significant stenosis or occlusion. .  . Right subclavian artery:  No significant stenosis or occlusion. .  . Left subclavian artery:  No significant stenosis or occlusion. .  . Right common carotid artery:  No significant stenosis or occlusion. .  . Right external carotid artery:  No significant stenosis or occlusion. .  . Right internal carotid artery:  No significant stenosis or occlusion. .  . Moderate atherosclerosis of the carotid bulb. Left common carotid artery: No significant stenosis or occlusion. .  . Left external carotid artery:  No significant stenosis or occlusion. .  . Left internal carotid artery:  No significant stenosis or occlusion. . Moderate atherosclerosis of the carotid bulb. Right vertebral artery:  No significant stenosis or occlusion. .. Left vertebral artery:  No significant stenosis or occlusion. . Basilar artery:  No significant stenosis, occlusion, or aneurysm. .  Right middle cerebral artery:  No significant stenosis, occlusion, or aneurysm. Right anterior cerebral artery:  No significant stenosis, occlusion, or aneurysm. Ebony Gearing Anterior communicating artery: No significant stenosis, occlusion, or aneurysm. Ebony Gearing Left middle cerebral artery:  Acute arterial occlusion involving a proximal M2 branch near the left MCA bifurcation. The M1 segment appears patent. Left anterior cerebral artery:  No significant stenosis, occlusion, or aneurysm. .  Right posterior communicating artery: No significant stenosis, occlusion, or aneurysm. Ebony Gearing Left posterior communicating artery:  No significant stenosis, occlusion, or aneurysm. .  Right posterior cerebral artery:  No significant stenosis, occlusion, or aneurysm. Ebony Gearing Left posterior cerebral artery:  No significant stenosis, occlusion, or aneurysm. .      Impression: 1. Acute arterial occlusion involving a proximal M2 branch near the left MCA bifurcation. 2.  Multiple new small pulmonary nodules since the prior exam in September 2019. The largest of these measures 7 mm.  Background of emphysema. Recommend chest CT to further evaluate for primary lung malignancy or metastatic disease. Findings were called to patient by Dr. Diana Thompson via telephone on 3/19/2020 at 1217 hours    Ct Code Neuro Head Wo Contrast    Result Date: 3/19/2020  HEAD CT WITHOUT CONTRAST  3/19/2020 HISTORY:   Code stroke;  left-sided deficit TECHNIQUE: Noncontrast axial images were obtained through the brain. All CT scans at this facility used dose modulation, interactive reconstruction and/or weight based dosing when appropriate to reduce radiation dose to as low as reasonably achievable. COMPARISON: None FINDINGS: There is no acute intracranial hemorrhage, significant mass effect or CT evidence of acute large artery territorial infarction. Please note that a hyperacute infarct or small vessel infarct may not be apparent on initial CT imaging. Mild cerebral volume loss is present. There is no hydrocephalus , intra-axial mass or abnormal extra-axial fluid collection. There are no displaced skull fractures. The mastoid air cells and paranasal sinuses are clear where imaged. IMPRESSION: No acute findings       Assessment and Plan:     Hospital Problems as of 3/19/2020 Date Reviewed: 2/3/2020          Codes Class Noted - Resolved POA    CVA (cerebral vascular accident) [facility]) ICD-10-CM: I63.9  ICD-9-CM: 434.91  3/19/2020 - Present Unknown            Patient with acute CVA treated with TPA. Not candidate for neuro endovascular intervention.     CVA  Symptoms improving  CT head: No acute pathology seen  CTA head and neck: High-grade stenosis versus occlusion involving the anterior M2 branch of the left MCA  Tele-neurology has evaluated patient    Plan:  -Begin aspirin and Plavix 24 hours after TPA  -BP goal 120180  -Repeat head CT in 24 hours  -MRI brain  -Echo with bubble study  -Telemetry monitoring  -Neurology consultation  -PT/OT consult    Abnormal imaging  CTA head and neck: Multiple new small pulmonary nodules since prior exam 9/2019, largest of 7 mm  Malignancy may explain hypercoagulable state and acute embolic stroke    Plan:  -Chest CT with IV contrast when patient stable or as outpatient    COPD  No wheezing on examination    Plan:  -Continue home medications  -Obtain O2 saturation > 88%    Diabetes mellitus  -Hold home medications  -POC before meals and at bedtime  -Insulin sliding scale    Hyperlipidemia  -Continue home medications    Hypertension  BP stable    Plan:  -Goal between 120180    CAD  -Hold aspirin        DVT ppx ordered  Code status:  Full  Estimated LOS:  Greater than 2 midnights  Risk:  high    Signed:  Wiliam Luciano MD

## 2020-03-19 NOTE — PROGRESS NOTES
SW went to meet with patient who was in CT. SW to return.      Marguerite Jessica, [address] [facility]    [address] [facility]  [email or web address]

## 2020-03-19 NOTE — ED PROVIDER NOTES
27-year-old  female with a history of hypertension, coronary disease, diabetes, COPD and tobacco abuse presents the emergency department complaining of sudden onset of right-sided weakness, right facial droop, and difficulty speaking. Onset approximately 30 minutes prior to presentation. Patient denies any pain or visual changes. Denies prior history of stroke and is not on any blood thinners. The history is provided by the patient. The history is limited by the condition of the patient. Past Medical History:   Diagnosis Date    Acute renal failure ([address] 12/5/2013    Baseline creatinine was 0.8, and currently it is 2.9     Acute respiratory failure ([address] 12/5/2013    Adverse effect of anesthesia     difficulty waking up    Arthritis     Back pain     CAD (coronary artery disease)     CAP (community acquired pneumonia) 12/7/2013    COPD exacerbation ([address] 6/8/2017    Oxygen at 3lpm continuously    Cystitis     Diabetes ([address]     Difficult intubation 2005    with spine surgery at [facility]    Hypertension     Hypoproteinemia ([address] 12/11/2013    Hypotension 12/5/2013    Hypoxemia 12/20/2013    follow up overnight oximetry on room air 3/2014 - resolved.     Ill-defined condition     hx of IC     Migraine headache     Myalgia     Pleural effusion 12/17/2013    Left: 450 ml of fluid was obtained       Positive occult stool blood test 12/9/2013    Psychiatric disorder     Respiratory failure ([address] 12/2013    required intubation    Sepsis ([address] 12/7/2013    Septicemia ([address] Dec 2013    no BP, pneumonia, \"Everything was failing\"- on vent 12/6-12/17    Unspecified adverse effect of anesthesia     took a long time to wake up 2005    Upper GI bleed 12/10/2013    GI evaluated        Past Surgical History:   Procedure Laterality Date    HX CHOLECYSTECTOMY      HX HEART CATHETERIZATION  2011    Mild CAD per Dr Enid Olivares  12/29/2016    LAD:30% stenosis with \"sluggish flow\",Dx:30% stenosis. LCX OM:40-50% stenosis with - FFR,Small accesssory OM:70% ostial stenosis in 1 mmvessel,and RCA:30% diffuse CAD.  HX HEART CATHETERIZATION  07/15/2019    LV:EF55-60%. LM:nl. LAD:<30% stenosis. mLCX:50-60% stenosis. mRCA:60% stenosis with normal IFR (1.0) and irregularities    HX HYSTERECTOMY      hyesterectomy    HX ORTHOPAEDIC      4 back surgeries    HX ORTHOPAEDIC Right 10/2014    elbow    NEUROLOGICAL PROCEDURE UNLISTED  2000 & 2005    spine X 4         Family History:   Problem Relation Age of Onset    Cancer Mother         colon    Alzheimer Mother     Heart Disease Father        Social History     Socioeconomic History    Marital status:      Spouse name: Not on file    Number of children: Not on file    Years of education: Not on file    Highest education level: Not on file   Occupational History    Occupation: disabled     Employer: NOT EMPLOYED   Social Needs    Financial resource strain: Not on file    Food insecurity     Worry: Not on file     Inability: Not on file    Transportation needs     Medical: Not on file     Non-medical: Not on file   Tobacco Use    Smoking status: Current Every Day Smoker     Packs/day: 1.00     Years: 41.00     Pack years: 41.00     Types: Cigarettes    Smokeless tobacco: Never Used    Tobacco comment: currently smoking 1/2 or less   Substance and Sexual Activity    Alcohol use:  Yes     Alcohol/week: 0.0 standard drinks     Comment: RARE    Drug use: No    Sexual activity: Not Currently   Lifestyle    Physical activity     Days per week: Not on file     Minutes per session: Not on file    Stress: Not on file   Relationships    Social connections     Talks on phone: Not on file     Gets together: Not on file     Attends Spiritism service: Not on file     Active member of club or organization: Not on file     Attends meetings of clubs or organizations: Not on file     Relationship status: Not on file    Intimate partner violence     Fear of current or ex partner: Not on file     Emotionally abused: Not on file     Physically abused: Not on file     Forced sexual activity: Not on file   Other Topics Concern    Not on file   Social History Narrative    Pt is  and lives with her . She on disability. ALLERGIES: Latex; Latex, natural rubber; Anoro ellipta [umeclidinium-vilanterol]; Egg; Avelox [moxifloxacin]; Banana; Contrast dye [iodine]; Moxifloxacin hcl; Sudafed [pseudoephedrine hcl]; Valium [diazepam]; and Watermelon    Review of Systems   Unable to perform ROS: Acuity of condition       There were no vitals filed for this visit. Physical Exam  Vitals signs and nursing note reviewed. Constitutional:       General: She is in acute distress (Anxious). Appearance: She is well-developed. HENT:      Head: Normocephalic and atraumatic. Right Ear: External ear normal.      Left Ear: External ear normal.   Eyes:      Conjunctiva/sclera: Conjunctivae normal.      Pupils: Pupils are equal, round, and reactive to light. Neck:      Musculoskeletal: Normal range of motion and neck supple. Cardiovascular:      Rate and Rhythm: Normal rate and regular rhythm. Heart sounds: Normal heart sounds. Pulmonary:      Effort: Pulmonary effort is normal.      Breath sounds: Normal breath sounds. Abdominal:      General: Bowel sounds are normal.      Palpations: Abdomen is soft. Tenderness: There is no abdominal tenderness. Musculoskeletal: Normal range of motion. Skin:     General: Skin is warm and dry. Capillary Refill: Capillary refill takes less than 2 seconds. Neurological:      Mental Status: She is alert and oriented to person, place, and time. Cranial Nerves: Cranial nerve deficit (Patient with right facial droop, tongue deviates to the right) and facial asymmetry present.       Sensory: Sensory deficit (Subjective decreased sensation to light touch entire right side) present. Motor: Weakness present. Comments: Strength 3+/5 right upper extremity, 2+/5 right lower extremity. No deficits noted in left side. On visual field examination, patient grossly has mild deficit in the left visual fields. Psychiatric:         Mood and Affect: Mood is anxious. Speech: Speech is slurred (Slight). Behavior: Behavior normal.          MDM  Number of Diagnoses or Management Options     Amount and/or Complexity of Data Reviewed  Clinical lab tests: ordered and reviewed  Tests in the radiology section of CPT®: ordered and reviewed  Review and summarize past medical records: yes  Discuss the patient with other providers: yes  Independent visualization of images, tracings, or specimens: yes    Risk of Complications, Morbidity, and/or Mortality  Presenting problems: high  Diagnostic procedures: high  Management options: high    Critical Care  Total time providing critical care: (CRITICAL CARE Documentation: This patient is critically ill and there is a high probability of of imminent or life threatening deterioration in the patient's condition without immediate management. The nature of the patient's clinical problem is: Acute left MCA stroke    I have spent 45 minutes in direct patient care, documentation, review of labs/xrays/old records, discussion with Staff, Colleague, Nursing . The time involved in the performance of separately reportable procedures was not counted toward critical care time. Jose Manuel Whitaker MD; 3/19/2020 @12:53 PM    )    Patient Progress  Patient progress: improved    ED Course as of Mar 19 1253   Thu Mar 19, 2020   1158 Discussed findings with Dr. Maria Del Carmen Fink. Recommends TPA first.  Risks and benefits of TPA discussed with patient and orders placed. Awaiting SOC evaluation of the patient    [BB]   [phone] is improved to a 7, speech is much more clear.   Case discussed with Dr. Maria Del Carmen Fink again who felt that this time the patient was best suited with admission here and no need for further intervention at this time. Consult was made to the hospitalist.    [BB]      ED Course User Index  [BB] Rabia Sheppard MD       Procedures    The patient was observed in the ED. on presentation a code S was called, patient was sent for CT as well as CTA and perfusion scans, and a call was made to Dr. Britton Mason of neurosurgical intervention to alert her of the patient's status. Unfortunately, there was a delay in the consultation to [facility] neurology which led to a delay in TPA administration. Case was discussed with Dr. Britton Mason and the TPA was ordered immediately after the discussion. The patient was noted to improve from an NIH of 11 to an NIH of 7 with the first recheck after initiation of TPA, and it was felt by Dr. Britton Mason that the patient was best served with admission and continued observation at this time with no intervention at this time. Results Reviewed:      Recent Results (from the past 24 hour(s))   CBC WITH AUTOMATED DIFF    Collection Time: 03/19/20 11:35 AM   Result Value Ref Range    WBC 7.6 4.3 - 11.1 K/uL    RBC 4.66 4.05 - 5.2 M/uL    HGB 13.9 11.7 - 15.4 g/dL    HCT 42.8 35.8 - 46.3 %    MCV 91.8 79.6 - 97.8 FL    MCH 29.8 26.1 - 32.9 PG    MCHC 32.5 31.4 - 35.0 g/dL    RDW 13.9 11.9 - 14.6 %    PLATELET 158 908 - 217 K/uL    MPV 8.5 (L) 9.4 - 12.3 FL    ABSOLUTE NRBC 0.00 0.0 - 0.2 K/uL    DF AUTOMATED      NEUTROPHILS 71 43 - 78 %    LYMPHOCYTES 20 13 - 44 %    MONOCYTES 6 4.0 - 12.0 %    EOSINOPHILS 2 0.5 - 7.8 %    BASOPHILS 1 0.0 - 2.0 %    IMMATURE GRANULOCYTES 0 0.0 - 5.0 %    ABS. NEUTROPHILS 5.4 1.7 - 8.2 K/UL    ABS. LYMPHOCYTES 1.5 0.5 - 4.6 K/UL    ABS. MONOCYTES 0.5 0.1 - 1.3 K/UL    ABS. EOSINOPHILS 0.1 0.0 - 0.8 K/UL    ABS. BASOPHILS 0.1 0.0 - 0.2 K/UL    ABS. IMM.  GRANS. 0.0 0.0 - 0.5 K/UL   METABOLIC PANEL, COMPREHENSIVE    Collection Time: 03/19/20 11:35 AM   Result Value Ref Range    Sodium 131 (L) 136 - 145 mmol/L    Potassium 3.5 3.5 - 5.1 mmol/L    Chloride 100 98 - 107 mmol/L    CO2 25 21 - 32 mmol/L    Anion gap 6 (L) 7 - 16 mmol/L    Glucose 296 (H) 65 - 100 mg/dL    BUN 10 8 - 23 MG/DL    Creatinine 0.90 0.6 - 1.0 MG/DL    GFR est AA >60 >60 ml/min/1.73m2    GFR est non-AA >60 >60 ml/min/1.73m2    Calcium 7.9 (L) 8.3 - 10.4 MG/DL    Bilirubin, total 0.2 0.2 - 1.1 MG/DL    ALT (SGPT) 15 12 - 65 U/L    AST (SGOT) 13 (L) 15 - 37 U/L    Alk. phosphatase 69 50 - 130 U/L    Protein, total 6.2 (L) 6.3 - 8.2 g/dL    Albumin 2.8 (L) 3.2 - 4.6 g/dL    Globulin 3.4 2.3 - 3.5 g/dL    A-G Ratio 0.8 (L) 1.2 - 3.5     PROTHROMBIN TIME + INR    Collection Time: 03/19/20 11:35 AM   Result Value Ref Range    Prothrombin time 14.8 (H) 12.0 - 14.7 sec    INR 1.1     TROPONIN I    Collection Time: 03/19/20 11:35 AM   Result Value Ref Range    Troponin-I, Qt. <0.02 (L) 0.02 - 0.05 NG/ML   GLUCOSE, POC    Collection Time: 03/19/20 11:43 AM   Result Value Ref Range    Glucose (POC) 299 (H) 65 - 100 mg/dL       CTA CODE NEURO HEAD AND NECK W CONT   Final Result   Impression:    1. Acute arterial occlusion involving a proximal M2 branch near the left MCA   bifurcation. 2.  Multiple new small pulmonary nodules since the prior exam in September 2019. The largest of these measures 7 mm. Background of emphysema. Recommend chest CT   to further evaluate for primary lung malignancy or metastatic disease. Findings were called to patient by Dr. Hernan Garcia via telephone on 3/19/2020 at   1217 hours      CT PERF W CBF   Preliminary Result   IMPRESSION:   Core infarct in the left MCA territory with larger surrounding   ischemic penumbra. Please note that the determination of patient treatment is not based solely upon   imaging factors or calculation values. Management of ischemia is at the   discretion of the primary physician and is based upon a combination of clinical   and imaging data, along other factors.            CT CODE NEURO HEAD WO CONTRAST Final Result   IMPRESSION: No acute findings

## 2020-03-19 NOTE — PROGRESS NOTES
Location of Assessment: ER RM1    Socioevironmental:  SW met with patient who states that she lives with her , daughter (43) and granddaughter (15). Patient states that she does not work and is currently on disability. Ambulation/ADLs:  Patient states that she has a cane and wheelchair but does not use them consistently. Patient does not require ADL assistance at her baseline and denies any falls. Primary Care:  Patient sees Dr. Abel Betancourt for primary care and is current. Needs:   Patient anticipated to admit and transfer DT. GYPSY/CM dept will continue to follow for discharge needs following PT/OT/ST evaluations.       Malena Hull, [address] [facility]    [address] [email or web address]

## 2020-03-19 NOTE — ED NOTES
TRANSFER - OUT REPORT:    Verbal report given to DIAN DUTTA on Christina Hernández  being transferred to [facility] [phone] for routine progression of care       Report consisted of patients Situation, Background, Assessment and   Recommendations(SBAR). Information from the following report(s) SBAR was reviewed with the receiving nurse. Lines:   Peripheral IV 03/19/20 Right Antecubital (Active)        Opportunity for questions and clarification was provided.       Patient transported with:   Monitor  O2 @ 2 liters  Registered Nurse

## 2020-03-19 NOTE — PROGRESS NOTES
Patient is an NIH 2-3 for her right facial droop and sensory deficit. She has normal speech and no drift in her right arm. Her right arm is a little weaker than her left, but it is improving. CTA shows a distal M2 nonocclusive thrombus - most likely a ruptured plaque that has improved with tpa. CTP shows improved flow. This is a ruptured plaque with stenosis - the CBV and CBF are normal and the MTT and the TTP is increased. There is no need for intervention. Will let hospitalist admit to the ICU overnight since the patient received tpa.

## 2020-03-19 NOTE — PROGRESS NOTES
Bedside and Verbal shift change report given to [address] [place] [address] (oncoming nurse) by Lainey Dubon RN (offgoing nurse). Report included the following information SBAR, Kardex, Intake/Output, MAR, Recent Results, Med Rec Status, Cardiac Rhythm NSR, Alarm Parameters , Procedure Verification, Quality Measures and Dual Neuro Assessment.

## 2020-03-19 NOTE — ED NOTES
I am currently in patients ER room waiting for patient to return from [address] [facility]

## 2020-03-19 NOTE — CONSULTS
LEAPFROG EVALUATION -- [facility]    The Patient is current in the ICU for: CVA s/p tPA  He is being managed by:  IM and Neurology  No need for any additional acute ICU interventions. Patient is awake and alert. Currently hemodynamically stable. Please Call if Needed. No Charge. Magda Khalil MD    Electronically signed.

## 2020-03-19 NOTE — PROGRESS NOTES
Asked to admit patient. Initially on examination patient with improving CVA symptoms. After completion of exam and 10- 15minute time interval patient with worsening of symptoms. Attempted contact with Dr. Isa Arriola neuro interventionalists. Was able to speak with Kirsten Mujica NP who was with Dr. Isa Arriola. Concern for worsening symptoms expressed. NP and Dr. Isa Arriola recommended stat head CT to evaluate for TPA provoked bleed and [facility].   They did not recommend admission to [address] [place] [address] Dr:  -Transfer patient to [facility] ED as per recommendations from Neurovascular Intervention   -No admission orders placed   -Hospitalist service can admit at [address] [facility] downw if Dr. Isa Arriola chooses not to admit patient under her own service

## 2020-03-19 NOTE — ED TRIAGE NOTES
Patient arrives via EMS from [place]. Dr. Myles Samples notified. Patient is alert and oriented. Slight right sided facial droop noted.

## 2020-03-20 ENCOUNTER — APPOINTMENT (OUTPATIENT)
Dept: CT IMAGING | Age: 60
DRG: 065 | End: 2020-03-20
Attending: INTERNAL MEDICINE
Payer: COMMERCIAL

## 2020-03-20 LAB
ANION GAP SERPL CALC-SCNC: 9 MMOL/L (ref 7–16)
BUN SERPL-MCNC: 6 MG/DL (ref 8–23)
CALCIUM SERPL-MCNC: 8.4 MG/DL (ref 8.3–10.4)
CHLORIDE SERPL-SCNC: 106 MMOL/L (ref 98–107)
CO2 SERPL-SCNC: 25 MMOL/L (ref 21–32)
CREAT SERPL-MCNC: 0.68 MG/DL (ref 0.6–1)
GLUCOSE BLD STRIP.AUTO-MCNC: 111 MG/DL (ref 65–100)
GLUCOSE BLD STRIP.AUTO-MCNC: 197 MG/DL (ref 65–100)
GLUCOSE BLD STRIP.AUTO-MCNC: 96 MG/DL (ref 65–100)
GLUCOSE BLD STRIP.AUTO-MCNC: 98 MG/DL (ref 65–100)
GLUCOSE SERPL-MCNC: 104 MG/DL (ref 65–100)
POTASSIUM SERPL-SCNC: 3.8 MMOL/L (ref 3.5–5.1)
SODIUM SERPL-SCNC: 140 MMOL/L (ref 136–145)

## 2020-03-20 PROCEDURE — 92610 EVALUATE SWALLOWING FUNCTION: CPT

## 2020-03-20 PROCEDURE — 74011250637 HC RX REV CODE- 250/637: Performed by: INTERNAL MEDICINE

## 2020-03-20 PROCEDURE — 74011250636 HC RX REV CODE- 250/636: Performed by: INTERNAL MEDICINE

## 2020-03-20 PROCEDURE — 65270000029 HC RM PRIVATE

## 2020-03-20 PROCEDURE — 99233 SBSQ HOSP IP/OBS HIGH 50: CPT | Performed by: PSYCHIATRY & NEUROLOGY

## 2020-03-20 PROCEDURE — 82962 GLUCOSE BLOOD TEST: CPT

## 2020-03-20 PROCEDURE — C8929 TTE W OR WO FOL WCON,DOPPLER: HCPCS

## 2020-03-20 PROCEDURE — 94760 N-INVAS EAR/PLS OXIMETRY 1: CPT

## 2020-03-20 PROCEDURE — 36415 COLL VENOUS BLD VENIPUNCTURE: CPT

## 2020-03-20 PROCEDURE — 74011000250 HC RX REV CODE- 250: Performed by: INTERNAL MEDICINE

## 2020-03-20 PROCEDURE — 80048 BASIC METABOLIC PNL TOTAL CA: CPT

## 2020-03-20 PROCEDURE — 74011636637 HC RX REV CODE- 636/637: Performed by: INTERNAL MEDICINE

## 2020-03-20 PROCEDURE — 70450 CT HEAD/BRAIN W/O DYE: CPT

## 2020-03-20 PROCEDURE — 94640 AIRWAY INHALATION TREATMENT: CPT

## 2020-03-20 PROCEDURE — 99252 IP/OBS CONSLTJ NEW/EST SF 35: CPT | Performed by: PHYSICAL MEDICINE & REHABILITATION

## 2020-03-20 RX ORDER — BUDESONIDE 0.5 MG/2ML
500 INHALANT ORAL
Status: DISCONTINUED | OUTPATIENT
Start: 2020-03-20 | End: 2020-03-22 | Stop reason: HOSPADM

## 2020-03-20 RX ADMIN — DILTIAZEM HYDROCHLORIDE 240 MG: 120 CAPSULE, COATED, EXTENDED RELEASE ORAL at 08:37

## 2020-03-20 RX ADMIN — BUDESONIDE 500 MCG: 0.5 INHALANT RESPIRATORY (INHALATION) at 08:18

## 2020-03-20 RX ADMIN — Medication 10 ML: at 14:12

## 2020-03-20 RX ADMIN — BUDESONIDE 500 MCG: 0.5 INHALANT RESPIRATORY (INHALATION) at 21:42

## 2020-03-20 RX ADMIN — Medication 5 ML: at 21:28

## 2020-03-20 RX ADMIN — LORAZEPAM 0.5 MG: 0.5 TABLET ORAL at 17:12

## 2020-03-20 RX ADMIN — SODIUM CHLORIDE 500 ML: 900 INJECTION, SOLUTION INTRAVENOUS at 00:53

## 2020-03-20 RX ADMIN — SODIUM CHLORIDE 500 ML: 900 INJECTION, SOLUTION INTRAVENOUS at 03:43

## 2020-03-20 RX ADMIN — LORAZEPAM 0.5 MG: 0.5 TABLET ORAL at 08:37

## 2020-03-20 RX ADMIN — Medication 10 ML: at 06:01

## 2020-03-20 RX ADMIN — PERFLUTREN 1 ML: 6.52 INJECTION, SUSPENSION INTRAVENOUS at 09:32

## 2020-03-20 RX ADMIN — ISOSORBIDE MONONITRATE 120 MG: 30 TABLET, EXTENDED RELEASE ORAL at 08:37

## 2020-03-20 RX ADMIN — PRAVASTATIN SODIUM 20 MG: 20 TABLET ORAL at 21:28

## 2020-03-20 RX ADMIN — INSULIN LISPRO 2 UNITS: 100 INJECTION, SOLUTION INTRAVENOUS; SUBCUTANEOUS at 11:19

## 2020-03-20 RX ADMIN — CITALOPRAM HYDROBROMIDE 40 MG: 20 TABLET ORAL at 08:37

## 2020-03-20 NOTE — PROGRESS NOTES
Chart reviewed and pt discussed in am IDR. Tx from ES s/p CVA and tPa. Screen completed prior to tx. PT/OT/ST. Will have IRC/Dr. Bernadine Hoff follow for any potential rehab needs. CM will continue to follow for d/c needs/POC. Care Management Interventions  PCP Verified by CM: Yes  Mode of Transport at Discharge:  Other (see comment)  Transition of Care Consult (CM Consult): Discharge Planning  Current Support Network: Lives with Morgan Yo [address] Provided?: ([employer]/MCR part A only)  Discharge Location  Discharge Placement: Unable to determine at this time

## 2020-03-20 NOTE — PROGRESS NOTES
03/20/20 1910   NIH Stroke Scale   Interval Other (comment)  (Dual NIH with DIAN Alvarez)   LOC 0   LOC Questions 0   LOC Commands 0   Best Gaze 0   Visual 0   Facial Palsy 0   Motor Right Arm 0   Motor Left Arm 0   Motor Right Leg 1   Motor Left Leg 0   Limb Ataxia 0   Sensory 0   Best Language 0   Dysarthria 0   Extinction and Inattention 0   Total 1

## 2020-03-20 NOTE — PROGRESS NOTES
Bedside and Verbal shift change report given to Lori Valencia RN (oncoming nurse) by Shoaib Oliver RN (offgoing nurse). Report included the following information SBAR, Kardex, ED Summary, Intake/Output, MAR, Recent Results and Cardiac Rhythm NSR. Pt resting in bed A&O X4 and on 2L NC. Dual neuro assessment complete with Shoaib Oliver RN. NIH 3. PERRLA 4 mm. Lung sounds coarse. NSR on monitor. /71. Stomach soft upon palpation with active bowel sounds. Pt voiding. SCD's to BLE with pedal pulses palpable. Pt denies pain at this time and call light in reach.

## 2020-03-20 NOTE — DISCHARGE INSTRUCTIONS

## 2020-03-20 NOTE — PROGRESS NOTES
3/20/2020  Attempted to see patient for OT assessment but nurse reports she is about to go to have a CT scan and maybe transferred to the floor this afternoon.    Thank you,  Ruth Easton, OT

## 2020-03-20 NOTE — PROGRESS NOTES
TRANSFER - OUT REPORT:    Verbal report given to Liliana Meyer RN on Jalen Brady  being transferred to 7th floor for routine progression of care       Report consisted of patients Situation, Background, Assessment and   Recommendations(SBAR). Information from the following report(s) SBAR and Dual Neuro Assessment was reviewed with the receiving nurse. Lines:   Peripheral IV 03/19/20 Right Antecubital (Active)   Site Assessment Clean, dry, & intact 3/20/2020  4:00 PM   Phlebitis Assessment 0 3/20/2020  4:00 PM   Infiltration Assessment 0 3/20/2020  4:00 PM   Dressing Status Clean, dry, & intact 3/20/2020  4:00 PM   Dressing Type Transparent;Tape 3/20/2020  4:00 PM   Hub Color/Line Status Capped 3/20/2020  4:00 PM   Alcohol Cap Used No 3/19/2020  7:01 PM        Opportunity for questions and clarification was provided.

## 2020-03-20 NOTE — PROGRESS NOTES
SBP ; stable neuro/ asymptomatic; MD notified via "[employer]. No new orders at this time. Will continue to monitor.

## 2020-03-20 NOTE — PROGRESS NOTES
SPEECH LANGUAGE PATHOLOGY: DYSPHAGIA- Initial Assessment    NAME/AGE/GENDER: Elliot Mack is a 61 y.o. female  DATE: 3/20/2020  PRIMARY DIAGNOSIS: CVA (cerebral vascular accident) ([facility] [I63.9]      ICD-10: Treatment Diagnosis: R13.12 Dysphagia, Oropharyngeal Phase    RECOMMENDATIONS   DIET:    continue prescribed diet   PO:  Regular   Liquids:  regular thin    MEDICATIONS: With liquid     ASPIRATION PRECAUTIONS  · Slow rate of intake  · Small bites/sips  · Upright at 90 degrees during meal     COMPENSATORY STRATEGIES/MODIFICATIONS  · None     EDUCATION:  · Recommendations discussed with Nursing  · Patient     RECOMMENDATIONS for CONTINUED SPEECH THERAPY:   YES: Anticipate need for ongoing speech therapy during this hospitalization and at next level of care. next session to evaluate speech/langauge abilities       ASSESSMENT   Patient presents with oropharyngeal swallow function that is within normal limits. No s/sx of airway compromise with liquids or solids. Recommend continue regular diet/thin liquids. Medications whole with liquid wash. No dysphagia treatment indicated. Educated on strategies to improve swallowing safety related to COPD including small bites/sips, d/c po intake if increased respiratory rate is noted, small meals throught the day. Patient verbalized understanding of strategies discussed. Recommend speech-language evaluation at next session. REHABILITATION POTENTIAL FOR STATED GOALS: Excellent    PLAN    FREQUENCY/DURATION: Speech therapy to follow up for assessment of cognitive-linguistic function.     - Recommendations for next treatment session: Next treatment will address speech-language/cognitive abilities    SUBJECTIVE   Patient sitting upright in bed. Reports speech and expressive language deficits at time of admission, but feels these are nearly back at baseline. Mild expressive language deficits reported prior to CVA due to \"dementia\".  Also endorses mild dysphagia at home due to difficulty \"coordinating breathing and swallowing because of the COPD\". History of Present Injury/Illness: Ms. Jayson Mcfarland  has a past medical history of Acute renal failure ([address] (12/5/2013), Acute respiratory failure ([address] (12/5/2013), Adverse effect of anesthesia, Arthritis, Back pain, CAD (coronary artery disease), CAP (community acquired pneumonia) (12/7/2013), COPD exacerbation ([address] (6/8/2017), CVA (cerebral vascular accident) ([address] (3/19/2020), Cystitis, Diabetes ([address] Difficult intubation (2005), Hypertension, Hypoproteinemia ([address] (12/11/2013), Hypotension (12/5/2013), Hypoxemia (12/20/2013), Ill-defined condition, Migraine headache, Myalgia, Pleural effusion (12/17/2013), Positive occult stool blood test (12/9/2013), Psychiatric disorder, Respiratory failure ([address] (12/2013), Sepsis ([address] (12/7/2013), Septicemia [facility]) (Dec 2013), Unspecified adverse effect of anesthesia, and Upper GI bleed (12/10/2013). She also has no past medical history of Arrhythmia, Asthma, Autoimmune disease ([address] Cancer ([address] Chronic kidney disease, Heart failure ([address] Malignant hyperthermia due to anesthesia, Nausea & vomiting, Pseudocholinesterase deficiency, PUD (peptic ulcer disease), Seizures ([address] Sleep apnea, Thromboembolus ([address] or Thyroid disease. . She also  has a past surgical history that includes hx cholecystectomy; hx hysterectomy; hx orthopaedic; hx orthopaedic (Right, 10/2014); neurological procedure unlisted (2000 & 2005); hx heart catheterization (2011); hx heart catheterization (12/29/2016); and hx heart catheterization (07/15/2019). Problem List:  (Impairments causing functional limitations):  1. Oropharyngeal dysphagia- No symptoms identified  2.      Previous Dysphagia: YES She endorses mild dysphagia at baseline due to difficulty \"cooridnating braething and swallowing because of COPD\"./  Diet Prior to Evaluation: Regular/thin    Orientation:   Person  Place  Time  Situation    Pain: Pain Scale 1: Numeric (0 - 10)  Pain Intensity 1: 0    Cognitive-Linguistic Screen:   Speech Production:   o WNL   Expressive Language:  o Needs further assessment     Receptive Language:  o Needs further assessment   Cognition:   o Needs further assessment    Patient communicated at the conversational level during dysphagia evaluation. No apparent word finding deficits at the conversational level. Appropriate responses to complex commands and open ended questions. Patient states that she has mild word finding deficits at home due to \"dementia\", but is able to effectively communicate at the conversational level. Will benefit from further assessment of speech-language/cognitive abilities at next visit. OBJECTIVE   Oral Motor:   · Labial: Right droop  · Dentition: Intact  · Oral Hygiene: Adequate  · Lingual: No impairment    Swallow evaluation:   Patient presented with thin liquid via cup and straw, puree, mixed, and solid consistencies. Appropriate oral prep with all textures. Timely swallow initiation, and single swallows upon palpation. Adequate oral clearing. No overt signs or symptoms of airway compromise observed with liquid or solid textures. INTERDISCIPLINARY COLLABORATION: Registered Nurse  PRECAUTIONS/ALLERGIES: Latex; Latex, natural rubber; Anoro ellipta [umeclidinium-vilanterol]; Egg; Avelox [moxifloxacin]; Banana; Moxifloxacin hcl; Sudafed [pseudoephedrine hcl];  Valium [diazepam]; and Watermelon     Tool Used: Dysphagia Outcome and Severity Scale (JENNIFER)    Score Comments   Normal Diet  [] 7 With no strategies or extra time needed   Functional Swallow  [] 6 May have mild oral or pharyngeal delay   Mild Dysphagia  [] 5 Which may require one diet consistency restricted    Mild-Moderate Dysphagia  [] 4 With 1-2 diet consistencies restricted   Moderate Dysphagia  [] 3 With 2 or more diet consistencies restricted   Moderate-Severe Dysphagia  [] 2 With partial PO strategies (trials with ST only)   Severe Dysphagia [] 1 With inability to tolerate any PO safely      Score:  Initial: 7 Most Recent: x (Date 03/20/20 )   Interpretation of Tool: The Dysphagia Outcome and Severity Scale (JENNIFER) is a simple, easy-to-use, 7-point scale developed to systematically rate the functional severity of dysphagia based on objective assessment and make recommendations for diet level, independence level, and type of nutrition. Current Medications:   Current Facility-Administered Medications on File Prior to Encounter   Medication Dose Route Frequency Provider Last Rate Last Dose    [COMPLETED] sodium chloride 0.9 % bolus infusion 100 mL  100 mL IntraVENous RAD ONCE Fidel Webber MD   Stopped at 03/19/20 1135    [COMPLETED] iopamidoL (ISOVUE-370) 76 % injection 100 mL  100 mL IntraVENous RAD ONCE Fidel Webber MD   100 mL at 03/19/20 1129    [COMPLETED] saline peripheral flush soln 10 mL  10 mL InterCATHeter RAD ONCE Fidel Webber MD   10 mL at 03/19/20 1129    [COMPLETED] alteplase (ACTIVASE) bolus dose  0.09 mg/kg IntraVENous ONCE Fidel Webber MD   6.57 mg at 03/19/20 1216    [COMPLETED] alteplase (ACTIVASE) infusion 59.13 mg  0.81 mg/kg IntraVENous ONCE Ani Webber MD   Stopped at 03/19/20 1316    [COMPLETED] 0.9% sodium chloride infusion 50 mL  50 mL IntraVENous ONCE Fidel Webber MD   Stopped at 03/19/20 1417    [DISCONTINUED] sodium chloride 0.9 % bolus infusion 50 mL  50 mL IntraVENous ONCE Fidel Webber MD         Current Outpatient Medications on File Prior to Encounter   Medication Sig Dispense Refill    amoxicillin-clavulanate (AUGMENTIN) 875-125 mg per tablet Take 1 Tab by mouth every twelve (12) hours. 20 Tab 0    predniSONE (DELTASONE) 10 mg tablet 3 po day 1, 2 po day 2 and 3, 1 po day 4 and 5 and 1/2 po day 6 and 7 10 Tab 0    benzonatate (TESSALON PERLES) 100 mg capsule Take 100 mg by mouth three (3) times daily as needed for Cough.       dilTIAZem CD (CARDIZEM CD) 240 mg ER capsule Take 1 Cap by mouth daily. 30 Cap 11    albuterol (PROAIR HFA) 90 mcg/actuation inhaler 2 puffs 4 times daily prn 1 Inhaler 11    albuterol-ipratropium (DUO-NEB) 2.5 mg-0.5 mg/3 ml nebu 1 vial via nebulizer qid. Indications: Bronchi Muscle Spasm resulting from COPD 120 Nebule 11    LORazepam (ATIVAN) 0.5 mg tablet Take one tablet three times a day when needed. 90 Tab 5    citalopram (CELEXA) 40 mg tablet TAKE ONE TABLET BY MOUTH ONCE DAILY. 30 Tab 11    pravastatin (PRAVACHOL) 20 mg tablet TAKE ONE TABLET BY MOUTH ONCE DAILY. 30 Tab 9    metFORMIN (GLUCOPHAGE) 500 mg tablet TAKE ONE TABLET BY MOUTH DAILY WITH BREAKFAST 30 Tab 3    isosorbide mononitrate ER (IMDUR) 120 mg CR tablet TAKE ONE TABLET BY MOUTH EVERY MORNING 30 Tab 10    cholecalciferol, vitamin D3, (VITAMIN D3) 2,000 unit tab Take  by mouth daily.  nitroglycerin (NITROSTAT) 0.4 mg SL tablet 1 Tab by SubLINGual route every five (5) minutes as needed for Chest Pain. Up to 3 tablets in 15min 25 Tab 11    budesonide (PULMICORT) 0.5 mg/2 mL nbsp 2 mL by Nebulization route two (2) times a day. Dx: COPD J44.9, Bill to medicare part B. 60 Each 11    OXYGEN-AIR DELIVERY SYSTEMS 3 lpm AM and 2 lpm PM      meclizine (ANTIVERT) 25 mg tablet Take 1 Tab by mouth three (3) times daily as needed. Indications: VERTIGO 30 Tab 1    aspirin delayed-release 81 mg tablet Take 1 Tab by mouth daily.  30 Tab 11       After treatment position/precautions:  · Upright in bed  · RN notified  · Call light within reach    Total Treatment Duration:   Time In: 6766  Time Out: [address], [facility], KATHARINE SMALLS, CCC-SLP

## 2020-03-20 NOTE — PROGRESS NOTES
TRANSFER - OUT REPORT:    Verbal report given to DIAN Coles on Deyanira Pool  being transferred to 7th floor for routine progression of care       Report consisted of patients Situation, Background, Assessment and   Recommendations(SBAR). Information from the following report(s) SBAR, ED Summary, Procedure Summary, MAR, Recent Results, Med Rec Status, Cardiac Rhythm SR, Quality Measures and Dual Neuro Assessment was reviewed with the receiving nurse. Lines:   Peripheral IV 03/19/20 Right Antecubital (Active)   Site Assessment Clean, dry, & intact 3/20/2020  4:00 PM   Phlebitis Assessment 0 3/20/2020  4:00 PM   Infiltration Assessment 0 3/20/2020  4:00 PM   Dressing Status Clean, dry, & intact 3/20/2020  4:00 PM   Dressing Type Transparent;Tape 3/20/2020  4:00 PM   Hub Color/Line Status Capped 3/20/2020  4:00 PM   Alcohol Cap Used No 3/19/2020  7:01 PM        Opportunity for questions and clarification was provided.       Patient transported with:   Monitor  O2 @ 3 liters  Registered Nurse  Cell phone with pt

## 2020-03-20 NOTE — PROGRESS NOTES
PT Note:  Evaluation attempted earlier today and pt was off the floor. Will re-attempt pending schedule and pt status.   Thanks,  Greg Munoz, PT, DPT

## 2020-03-20 NOTE — PROGRESS NOTES
03/20/20 1800   NIH Stroke Scale   Interval Other (comment)  (Dual NIH with ICU,RN)   LOC 0   LOC Questions 0   LOC Commands 0   Best Gaze 0   Visual 0   Facial Palsy 0   Motor Right Arm 0   Motor Left Arm 0   Motor Right Leg 1   Motor Left Leg 0   Limb Ataxia 0   Sensory 0   Best Language 0   Dysarthria 0   Extinction and Inattention 0   Total 1

## 2020-03-20 NOTE — PROGRESS NOTES
Pt's SBP running  most of the evening. Dr. Carlos Rojas notified and order received for 500 ml NS bolus.

## 2020-03-20 NOTE — PROGRESS NOTES
03/20/20 1800   Dual Skin Pressure Injury Assessment   Dual Skin Pressure Injury Assessment WDL   Second Care Provider (Based on [address] [place] [address]) DIAN Morris   Skin Integumentary   Skin Integumentary (WDL) WDL    Pressure  Injury Documentation No Pressure Injury Noted-Pressure Ulcer Prevention Initiated   Wound Prevention and Protection Methods   Orientation of Wound Prevention Posterior   Location of Wound Prevention Sacrum/Coccyx   Dressing Present  Yes   Dressing Status Intact   Wound Offloading (Prevention Methods) Bed, pressure reduction mattress;Pillows;Repositioning;Turning

## 2020-03-20 NOTE — PROGRESS NOTES
Hospitalist Progress Note    3/20/2020  Admit Date: 3/19/2020  3:03 PM   NAME: Yuri Rios   :  1960   MRN:  [id number]   Attending: Montez Hector MD  PCP:  Maciej High MD    SUBJECTIVE:   Patient is a 65yo F with hx COPD/chronic hypoxic respiratory failure (2L NC), tobacco abuse, HTN, and DM who presented to ER at [facility] with R facial droop and weakness. Found to have high grade stenosis/occlusion on CTA in anterior M2 branch L MCA. Given TPA with some improvement in symptoms and transferred downtown for ICU monitoring and neuro follow-up. 3/20:  Repeat CT pending this afternoon  Has had some relative mild low blood pressures overnight, received fluid bolus to maintain perfusion. Symptoms improving, still some R side weakness. Speech difficult if speaking too quickly. Review of Systems negative with exception of pertinent positives noted above  PHYSICAL EXAM     Visit Vitals  /67 (BP 1 Location: Left arm, BP Patient Position: At rest;Head of bed elevated (Comment degrees))   Pulse 84   Temp 98.2 °F (36.8 °C)   Resp 22   Ht 5' 6\" (1.676 m)   Wt 74.5 kg (164 lb 3.9 oz)   SpO2 93%   BMI 26.51 kg/m²      Temp (24hrs), Av.2 °F (36.8 °C), Min:97.4 °F (36.3 °C), Max:98.9 °F (37.2 °C)    Oxygen Therapy  O2 Sat (%): 93 % (20)  Pulse via Oximetry: 84 beats per minute (20)  O2 Device: Nasal cannula (20)  O2 Flow Rate (L/min): 2 l/min(Home O2) (20)  FIO2 (%): 28 % (20)    Intake/Output Summary (Last 24 hours) at 3/20/2020 0842  Last data filed at 3/20/2020 0602  Gross per 24 hour   Intake 1000 ml   Output 850 ml   Net 150 ml      General: No acute distress    Lungs:  CTA Bilaterally.    Heart:  Regular rate and rhythm,  No murmur, rub, or gallop  Abdomen: Soft, Non distended, Non tender, Positive bowel sounds  Extremities: No cyanosis, clubbing or edema  Neurologic:  Slight R facial droop, slight decreased strength RUE    MRI BRAIN WO CONT   Final Result   Impression: Punctate foci of diffusion restriction in the cortex of the left   precentral and postcentral gyri, may represent tiny acute infarctions, without   significant mass effect. CT PERF W CBF   Final Result   IMPRESSION:      Core infarct in the left MCA territory with surrounding ischemic penumbra. Volume of the core infarct and ischemic penumbra has decreased slightly compared   to the perfusion study earlier on the same day. Please note that the determination of patient treatment is not based solely upon   imaging factors or calculation values. Management of ischemia is at the   discretion of the primary physician and is based upon a combination of clinical   and imaging data, along other factors. CTA CODE NEURO HEAD AND NECK W CONT   Final Result   Impression:   1. High-grade stenosis versus occlusion involving the anterior M2 branch at the   left MCA bifurcation. Similar to the prior exam.   2.  Multiple small pulmonary nodules, new compared to the prior CT 9/30/2019. Background of emphysema. Chest CT is recommended to further evaluate for primary   lung malignancy and metastatic disease. 3.  Secretions are present within the trachea and mainstem bronchi, consistent   with aspiration. CT HEAD WO CONT    (Results Pending)         ASSESSMENT      Active Hospital Problems    Diagnosis Date Noted    CVA (cerebral vascular accident) ([address] 03/19/2020     Plan:    CVA:  S/p TPA. ICU monitoring and f/u CT this afternoon. Neurology following, appreciate reccs. Echo pending  statin  Tele  Pt/ot    Lung nodules:  Multiple new small pulmonary nodules noted on CTA neck  Malignancy may contribute to hypercoagulable state/cva risk  Needs CT chest/abdomen/pelvis with contrast to better characterize, can get inpatient, pending CT head    COPD:  No wheeze. Home meds.    Hx chronic respiratory failure on 2L    DM:  SSI    DVT Prophylaxis: start after TPA 24h obs  Dispo: pending progress, therapy evals.  Likely home in 1-2d unless further workup needed    Signed By: Ronen Oliveira MD     March 20, 2020

## 2020-03-20 NOTE — CONSULTS
Consult    Patient: Carmen Spencer MRN: [id number]     YOB: 1960  Age: 61 y.o. Sex: female      Subjective:      Carmen Spencer is a 61 y.o. female who is being seen for stroke status post TPA. The patient was seen in the emergency department by neurology via computer. She presented with right-sided weakness involving the right face and right upper limb. Overall, her symptoms were mild. CT scan was negative. CTA showed high-grade stenosis versus occlusion of the M2 branch of the left MCA. In the setting of low NIH stroke scale, the patient was not a candidate for mechanical thrombectomy. She did receive TPA and her symptoms improved. Today, she is doing well. She has a very mild facial droop and mild drift in the right upper limb. Past Medical History:   Diagnosis Date    Acute renal failure ([address] 12/5/2013    Baseline creatinine was 0.8, and currently it is 2.9     Acute respiratory failure ([address] 12/5/2013    Adverse effect of anesthesia     difficulty waking up    Arthritis     Back pain     CAD (coronary artery disease)     CAP (community acquired pneumonia) 12/7/2013    COPD exacerbation ([address] 6/8/2017    Oxygen at 3lpm continuously    CVA (cerebral vascular accident) ([address] 3/19/2020    Cystitis     Diabetes ([address]     Difficult intubation 2005    with spine surgery at [address]    Hypertension     Hypoproteinemia ([address] 12/11/2013    Hypotension 12/5/2013    Hypoxemia 12/20/2013    follow up overnight oximetry on room air 3/2014 - resolved.     Ill-defined condition     hx of IC     Migraine headache     Myalgia     Pleural effusion 12/17/2013    Left: 450 ml of fluid was obtained       Positive occult stool blood test 12/9/2013    Psychiatric disorder     Respiratory failure ([address] 12/2013    required intubation    Sepsis ([address] 12/7/2013    Septicemia ([address] Dec 2013    no BP, pneumonia, \"Everything was failing\"- on vent 12/6-12/17    Unspecified adverse effect of anesthesia     took a long time to wake up 2005    Upper GI bleed 12/10/2013    GI evaluated      Past Surgical History:   Procedure Laterality Date    HX CHOLECYSTECTOMY      HX HEART CATHETERIZATION  2011    Mild CAD per Dr Refugio Evans  12/29/2016    LAD:30% stenosis with \"sluggish flow\",Dx:30% stenosis. LCX OM:40-50% stenosis with - FFR,Small accesssory OM:70% ostial stenosis in 1 mmvessel,and RCA:30% diffuse CAD.  HX HEART CATHETERIZATION  07/15/2019    LV:EF55-60%. LM:nl. LAD:<30% stenosis. mLCX:50-60% stenosis. mRCA:60% stenosis with normal IFR (1.0) and irregularities    HX HYSTERECTOMY      hyesterectomy    HX ORTHOPAEDIC      4 back surgeries    HX ORTHOPAEDIC Right 10/2014    elbow    NEUROLOGICAL PROCEDURE UNLISTED  2000 & 2005    spine X 4      Family History   Problem Relation Age of Onset    Cancer Mother         colon    Alzheimer Mother     Heart Disease Father      Social History     Tobacco Use    Smoking status: Current Every Day Smoker     Packs/day: 1.00     Years: 41.00     Pack years: 41.00     Types: Cigarettes    Smokeless tobacco: Never Used    Tobacco comment: currently smoking 1/2 or less   Substance Use Topics    Alcohol use:  Yes     Alcohol/week: 0.0 standard drinks     Comment: RARE      Current Facility-Administered Medications   Medication Dose Route Frequency Provider Last Rate Last Dose    budesonide (PULMICORT) 500 mcg/2 ml nebulizer suspension  500 mcg Nebulization BID RT Verito Sosa MD        benzonatate (TESSALON) capsule 100 mg  100 mg Oral TID PRN Verito Sosa MD        citalopram (CELEXA) tablet 40 mg  40 mg Oral DAILY Verito Sosa MD   40 mg at 03/20/20 6550    dilTIAZem CD (CARDIZEM CD) capsule 240 mg  240 mg Oral DAILY Verito Sosa MD   240 mg at 03/20/20 7737    isosorbide mononitrate ER (IMDUR) tablet 120 mg  120 mg Oral DAILY Verito Sosa MD   120 mg at 03/20/20 0837    LORazepam (ATIVAN) tablet 0.5 mg  0.5 mg Oral BID Argelia Agee MD   0.5 mg at 03/20/20 8711    meclizine (ANTIVERT) tablet 25 mg  25 mg Oral TID PRN Argelia Agee MD        nitroglycerin (NITROSTAT) tablet 0.4 mg  0.4 mg SubLINGual Q5MIN PRN Argelia Agee MD        pravastatin (PRAVACHOL) tablet 20 mg  20 mg Oral QHS Argelia Agee MD   20 mg at 03/19/20 2145    sodium chloride (NS) flush 5-40 mL  5-40 mL IntraVENous Q8H Argelia Agee MD   10 mL at 03/20/20 0601    sodium chloride (NS) flush 5-40 mL  5-40 mL IntraVENous PRN Argelia Agee MD        ondansetron [facility]) injection 4 mg  4 mg IntraVENous Q6H PRN Argelia Agee MD        dextrose (D50W) injection syrg 12.5 g  12.5 g IntraVENous ONCE PRN Argelia Agee MD        insulin lispro (HUMALOG) injection   SubCUTAneous AC&HS Argelia Agee MD   Stopped at 03/19/20 2200        Allergies   Allergen Reactions    Latex Other (comments)    Latex, Natural Rubber Itching     hives    Anoro Ellipta [Umeclidinium-Vilanterol] Other (comments)     Chest pain    Egg Itching     Itching with some tongue swelling and nausea    Avelox [Moxifloxacin] Swelling    Banana Swelling    Moxifloxacin Hcl Other (comments)    Sudafed [Pseudoephedrine Hcl] Palpitations    Valium [Diazepam] Other (comments)     angry    Watermelon Swelling       Review of Systems:  CONSTITUTIONAL: No weight loss, fever, chills, weakness or fatigue. HEENT: Eyes: No visual loss, blurred vision, double vision or yellow sclerae. Ears, Nose, Throat: No hearing loss, sneezing, congestion, runny nose or sore throat. SKIN: No rash or itching. CARDIOVASCULAR: No chest pain, chest pressure or chest discomfort. No palpitations or edema. RESPIRATORY: No shortness of breath, cough or sputum. GASTROINTESTINAL: No anorexia, nausea, vomiting or diarrhea. No abdominal pain or blood. GENITOURINARY: no burning with urination. NEUROLOGICAL: No headache, dizziness, syncope, paralysis, ataxia, numbness or tingling in the extremities. No change in bowel or bladder control. MUSCULOSKELETAL: No muscle, back pain, joint pain or stiffness. HEMATOLOGIC: No anemia, bleeding or bruising. LYMPHATICS: No enlarged nodes. No history of splenectomy. PSYCHIATRIC: No history of depression or anxiety. ENDOCRINOLOGIC: No reports of sweating, cold or heat intolerance. No polyuria or polydipsia. ALLERGIES: No history of asthma, hives, eczema or rhinitis. Objective:     Vitals:    03/20/20 0731 03/20/20 0802 03/20/20 0818 03/20/20 0831   BP: 116/60 124/65  132/67   Pulse: 71 72  84   Resp: 17 16  22   Temp:    98.2 °F (36.8 °C)   SpO2: 96% 96% 96% 93%   Weight:       Height:            Physical Exam:  General - Well developed, well nourished, in no apparent distress. Pleasant and conversant. HEENT - Normocephalic, atraumatic. Conjunctiva, tympanic membranes, and oropharynx are clear. Neck - Supple without masses, no bruits   Cardiovascular - Regular rate and rhythm. Normal S1, S2 without murmurs, rubs, or gallops. Lungs - Clear to auscultation. Abdomen - Soft, nontender with normal bowel sounds. Extremities - Peripheral pulses intact. No edema and no rashes. Neurological examination - Comprehension, attention , memory and reasoning are intact. Language and speech are normal. On cranial nerve examination pupils are equal round and reactive to light. Fundoscopic examination is normal. Visual acuity is adequate. Visual fields are full to finger confrontation. Extraocular motility is normal.  Face is asymmetric with a very mild right facial droop. Sensation is normal.. Hearing is intact to finger rustle bilaterally. Motor examination - There is normal muscle tone and bulk. Power is full throughout with exception to mild drift in the right upper limb. . Muscle stretch reflexes are normoactive and there are no pathological reflexes present. Sensation is intact to light touch, pinprick, vibration and proprioception in all extremities.  Cerebellar examination is normal.  Gait and stance deferred    NIHSS   NIHSS Score: 2  1a-Level of Consciousness 0  1b-What is Month/Age 0  1c-Open/Close Eyes&Hand 0  2 -Best Gaze 0  3 -Visual Fields 0  4 -Facial Palsy 1  5a-Motor-Left Arm 0  5b-Motor-Right Arm 1  6a-Motor-Left Leg 0  6b-Motor-Right Leg 0  7 -Limb Ataxia 0  8 -Sensory 0  9 -Best Language 0  10-Dysarthria 0  11-Extinction/Inattention 0    Lab Results   Component Value Date/Time    Cholesterol, total 154 04/26/2019 08:50 AM    HDL Cholesterol 43 04/26/2019 08:50 AM    LDL, calculated 87 04/26/2019 08:50 AM    VLDL, calculated 24 04/26/2019 08:50 AM    Triglyceride 121 04/26/2019 08:50 AM        Lab Results   Component Value Date/Time    Hemoglobin A1c 6.1 (H) 04/26/2019 08:50 AM        CT Results (most recent): Personally Reviewed   Results from [place] encounter on 03/19/20   CT PERF W CBF    Narrative EXAMINATION: CT PERFUSION    DATE: 3/19/2020 3:26 PM    INDICATION: : change neurological symptoms after TPA administration    COMPARISON: Same date at 11:30    TECHNIQUE: CT perfusion of the brain was obtained after the administration of  intravenous contrast. Perfusion maps and perfusion analysis output were  generated using the [employer] perfusion processing software algorithm. Radiation  dose reduction techniques were used for this study: All CT scans performed at  this facility use one or all of the following: Automated exposure control,  adjustment of the mA and/or kVp according to patient's size, iterative  reconstruction. FINDINGS:    Again noted is a perfusion abnormality corresponding to the left  MCA territory including an area of diminished cerebral blood flow now measuring  2 mL compared to 9 mL on the previous study and a surrounding area of elevated T  max measuring 36 mL currently compared to 100 mL of the previous study.       Perfusion Output Values:     CBF < 30% volume (best correlation with core infarct volume without overcalls):  2 ml (core infarction volume greater than 50 cc associated with poor outcomes)    Tmax > 6 seconds: 36 ml    Tmax/CBF Mismatch Volume: 34 ml    Tmax/CBF Mismatch Ratio: 18    Tmax > 10 seconds: 0 ml    Hypoperfusion index: 0.0. Impression IMPRESSION:    Core infarct in the left MCA territory with surrounding ischemic penumbra. Volume of the core infarct and ischemic penumbra has decreased slightly compared  to the perfusion study earlier on the same day. Please note that the determination of patient treatment is not based solely upon  imaging factors or calculation values. Management of ischemia is at the  discretion of the primary physician and is based upon a combination of clinical  and imaging data, along other factors. Results for orders placed or performed during the hospital encounter of 03/19/20   EKG, 12 LEAD, INITIAL   Result Value Ref Range    Ventricular Rate 106 BPM    Atrial Rate 106 BPM    P-R Interval 150 ms    QRS Duration 74 ms    Q-T Interval 358 ms    QTC Calculation (Bezet) 475 ms    Calculated P Axis 77 degrees    Calculated R Axis 68 degrees    Calculated T Axis 80 degrees    Diagnosis       !! AGE AND GENDER SPECIFIC ECG ANALYSIS !! Sinus tachycardia  Possible Left atrial enlargement  Borderline ECG  When compared with ECG of 15-JUL-2019 07:55,  Vent. rate has increased BY  42 BPM  T wave inversion less evident in Anterior leads  Confirmed by ASHA SANTOS (), Dayna Melendrez ([id number]) on 3/19/2020 1:55:55 PM     Results for orders placed or performed in visit on 06/21/19   AMB POC EKG ROUTINE W/ 12 LEADS, INTER & REP    Impression    Sinus  Rhythm   Low voltage in precordial leads.    -  Negative T-waves  -Possible  Anterior  ischemia.      ABNORMAL         MRI Results (most recent): Personally Reviewed  Results from [place] encounter on 03/19/20   MRI BRAIN WO CONT    Narrative Examination: MRI brain without gadolinium    History:  CVA, 60 years Female    Comparison:CT brain March 19, 2020 earlier today    Technique: Sagittal T1-weighted, axial FLAIR, axial fast spin-echo T2-weighted,  axial T1-weighted, axial gradient echo and coronal fast inversion recovery  images of the brain were performed. The study was performed on a 1.5  So MRI  unit. There were no contraindications to MRI based on the screening form. Findings: Punctate foci of diffusion restriction are seen in the left posterior  frontal lobe cortex in the precentral and post central gyrus, which may  represent tiny foci of acute infarction. There is no evidence of masses or mass  effect, obvious hemorrhage. The ventricles and sulci are appropriate for age. The brainstem and cerebellum are unremarkable. Paranasal sinuses are clear. Globes and other extracranial soft tissues are unremarkable. Impression Impression: Punctate foci of diffusion restriction in the cortex of the left  precentral and postcentral gyri, may represent tiny acute infarctions, without  significant mass effect. Most recent Echo  No results found for this visit on 03/19/20. Assessment:     63-year-old woman who presented with right-sided weakness and partial occlusion of the M2 branch of the left MCA. She received TPA with improvement in symptoms. She was not a mechanical thrombectomy candidate due to low NIH stroke scale. She is doing very well today. Plan:     24 hours after TPA the patient can be started on aspirin 81 mg daily    Tomorrow the patient should have a CT scan of the chest, abdomen, and pelvis. She has pulmonary nodules which are new from a fairly recent study. Malignancy should be ruled out and is responsible for about 10% of strokes. Smoking cessation    I will hold off on starting the patient on dual antiplatelet therapy. The reason for this is if the patient has a malignancy then a biopsy may be required.   If there are no suspicious lesions then we will load the patient with Plavix 300 mg and continue 75 mg daily for a total of 90 days. Allow permissive hypertension up to a blood pressure of 180 mmHg. Treat with labetalol or nicardipine for blood pressure over said number.     Echocardiogram with bubble study    Signed By: [place] Schools, DO     March 20, 2020

## 2020-03-20 NOTE — CONSULTS
PM&R Rehab Consult    Subjective:     Date of Consultation:  March 20, 2020    Referring Physician: Dr Sybil Mccollum  Consultant; Dr Fidel White, Neurology    Patient is a 61 y.o. female who is being seen for rehab recommendations s/p CVA    Active Problems:    CVA (cerebral vascular accident) ([facility] (3/19/2020)    HPI; Mrs Rayna Dubois is a functionally independent , right hand dominant 63yo female with a PMH of CAD, COPD (denies being O2 dependent), HTN, HLD, tobacco abuse, IBS, previous spine surgery, sepsis in 2013 with MOSF who presented to [facility]  with  Aphasia with subsequent right-sided weakness involving the right face and right upper limb. Overall, her symptoms were mild. CT scan was negative. CTA showed high-grade stenosis versus occlusion of the M2 branch of the left MCA. In the setting of low NIH stroke scale, the patient was not a candidate for mechanical thrombectomy. She did receive TPA and her symptoms improved. She subsequently had worsening symptoms and was trasferred to [facility]  Repeat HCT prior to transfer down[place] was neg for hemorrhage. MRI showed Punctate foci of diffusion restriction in the cortex of the left  precentral and postcentral gyri, may represent tiny acute infarctions, without significant mass effect. LDL 87. hgbA1c 6.1. NIHSS currently 2. CTp showed core infarct in the left MCA territory with surrounding ischemic penumbra. Volume of the core infarct and ischemic penumbra has decreased slightly compared to the perfusion study earlier on the same day.  has approved a reg diet with thin liquids. Her SBP has been in the 90s and has received two fluid boluses.  PT/OT pending  2D ECHO pending    Past Medical History:   Diagnosis Date    Acute renal failure ([facility] 12/5/2013    Baseline creatinine was 0.8, and currently it is 2.9     Acute respiratory failure ([address] 12/5/2013    Adverse effect of anesthesia     difficulty waking up    Arthritis     Back pain     CAD (coronary artery disease)     CAP (community acquired pneumonia) 12/7/2013    COPD exacerbation ([address] 6/8/2017    Oxygen at 3lpm continuously    CVA (cerebral vascular accident) ([address] 3/19/2020    Cystitis     Diabetes ([address]     Difficult intubation 2005    [facility]    Hypertension     Hypoproteinemia ([address] 12/11/2013    Hypotension 12/5/2013    Hypoxemia 12/20/2013    follow up overnight oximetry on room air 3/2014 - resolved.  Ill-defined condition     hx of IC     Migraine headache     Myalgia     Pleural effusion 12/17/2013    Left: 450 ml of fluid was obtained       Positive occult stool blood test 12/9/2013    Psychiatric disorder     Respiratory failure ([address] 12/2013    required intubation    Sepsis ([address] 12/7/2013    Septicemia ([address] Dec 2013    no BP, pneumonia, \"Everything was failing\"- on vent 12/6-12/17    Unspecified adverse effect of anesthesia     took a long time to wake up 2005    Upper GI bleed 12/10/2013    GI evaluated       Family History   Problem Relation Age of Onset    Cancer Mother         colon    Alzheimer Mother     Heart Disease Father       Social History     Tobacco Use    Smoking status: Current Every Day Smoker     Packs/day: 1.00     Years: 41.00     Pack years: 41.00     Types: Cigarettes    Smokeless tobacco: Never Used    Tobacco comment: currently smoking 1/2 or less   Substance Use Topics    Alcohol use: Yes     Alcohol/week: 0.0 standard drinks     Comment: RARE     Past Surgical History:   Procedure Laterality Date    HX CHOLECYSTECTOMY      HX HEART CATHETERIZATION  2011    Mild CAD per Dr Deshawn Main  12/29/2016    LAD:30% stenosis with \"sluggish flow\",Dx:30% stenosis. LCX OM:40-50% stenosis with - FFR,Small accesssory OM:70% ostial stenosis in 1 mmvessel,and RCA:30% diffuse CAD.  HX HEART CATHETERIZATION  07/15/2019    LV:EF55-60%. LM:nl. LAD:<30% stenosis. mLCX:50-60% stenosis. mRCA:60% stenosis with normal IFR (1.0) and irregularities    HX HYSTERECTOMY      hyesterectomy    HX ORTHOPAEDIC      4 back surgeries    HX ORTHOPAEDIC Right 10/2014    elbow    NEUROLOGICAL PROCEDURE UNLISTED  2000 & 2005    spine X 4      Prior to Admission medications    Medication Sig Start Date End Date Taking? Authorizing Provider   amoxicillin-clavulanate (AUGMENTIN) 875-125 mg per tablet Take 1 Tab by mouth every twelve (12) hours. 2/20/20   Kendra Claudio MD   predniSONE (DELTASONE) 10 mg tablet 3 po day 1, 2 po day 2 and 3, 1 po day 4 and 5 and 1/2 po day 6 and 7 2/20/20   Kendra Claudio MD   benzonatate (TESSALON PERLES) 100 mg capsule Take 100 mg by mouth three (3) times daily as needed for Cough. Provider, Historical   dilTIAZem CD (CARDIZEM CD) 240 mg ER capsule Take 1 Cap by mouth daily. 12/6/19   Adrian Lugo MD   albuterol [facility] HFA) 90 mcg/actuation inhaler 2 puffs 4 times daily prn 10/24/19   Sherice Hein NP   albuterol-ipratropium (DUO-NEB) 2.5 mg-0.5 mg/3 ml nebu 1 vial via nebulizer qid. Indications: Bronchi Muscle Spasm resulting from COPD 10/24/19   Sherice Hein NP   LORazepam (ATIVAN) 0.5 mg tablet Take one tablet three times a day when needed. 10/21/19   Kendra Claudio MD   citalopram (CELEXA) 40 mg tablet TAKE ONE TABLET BY MOUTH ONCE DAILY. 10/21/19   Kendra Claudio MD   pravastatin (PRAVACHOL) 20 mg tablet TAKE ONE TABLET BY MOUTH ONCE DAILY. 7/8/19   Kendra lCaudio MD   metFORMIN (GLUCOPHAGE) 500 mg tablet TAKE ONE TABLET BY MOUTH DAILY WITH BREAKFAST 7/8/19   Kendra Claudio MD   isosorbide mononitrate ER (IMDUR) 120 mg CR tablet TAKE ONE TABLET BY MOUTH EVERY MORNING 5/24/19   Adrian Lugo MD   cholecalciferol, vitamin D3, (VITAMIN D3) 2,000 unit tab Take  by mouth daily. Provider, Historical   nitroglycerin (NITROSTAT) 0.4 mg SL tablet 1 Tab by SubLINGual route every five (5) minutes as needed for Chest Pain.  Up to 3 tablets in 15min 1/25/19 Sujatha Longoria MD   budesonide (PULMICORT) 0.5 mg/2 mL nbsp 2 mL by Nebulization route two (2) times a day. Dx: COPD J44.9, Bill to medicare part B. 12/24/18   Rosa Ferguson NP   OXYGEN-AIR DELIVERY SYSTEMS 3 lpm AM and 2 lpm PM    Provider, Usman   meclizine (ANTIVERT) 25 mg tablet Take 1 Tab by mouth three (3) times daily as needed. Indications: VERTIGO 5/31/17   Torri Melchor MD   aspirin delayed-release 81 mg tablet Take 1 Tab by mouth daily. 12/14/16   Sujatha Longoria MD     Allergies   Allergen Reactions    Latex Other (comments)    Latex, Natural Rubber Itching     hives    Anoro Ellipta [Umeclidinium-Vilanterol] Other (comments)     Chest pain    Egg Itching     Itching with some tongue swelling and nausea    Avelox [Moxifloxacin] Swelling    Banana Swelling    Moxifloxacin Hcl Other (comments)    Sudafed [Pseudoephedrine Hcl] Palpitations    Valium [Diazepam] Other (comments)     angry    Watermelon Swelling        Review of Systems:   CONSTITUTIONAL: No weight loss, fever, chills, weakness or fatigue. HEENT: Eyes: No visual loss, blurred vision, double vision or yellow sclerae. Ears, Nose, Throat: No hearing loss, sneezing, congestion, runny nose or sore throat. SKIN: No rash or itching. CARDIOVASCULAR: No chest pain, chest pressure or chest discomfort. No palpitations or edema. RESPIRATORY: No shortness of breath, cough or sputum. GASTROINTESTINAL: No anorexia, nausea, vomiting or diarrhea. No abdominal pain or blood. GENITOURINARY: no burning with urination. NEUROLOGICAL: No headache, dizziness, syncope, paralysis, ataxia, numbness or tingling in the extremities. No change in bowel or bladder control. MUSCULOSKELETAL: No muscle,joint pain or stiffness. +chronic LBP and sciatica intermittently  HEMATOLOGIC: No anemia, bleeding or bruising. LYMPHATICS: No enlarged nodes. No history of splenectomy. PSYCHIATRIC: No history of depression or anxiety.   ENDOCRINOLOGIC: No reports of sweating, cold or heat intolerance. No polyuria or polydipsia. ALLERGIES: No history of asthma, hives, eczema or rhinitis.          Objective:     Vitals:  Blood pressure 101/55, pulse 94, temperature 98.2 °F (36.8 °C), resp. rate 26, height 5' 6\" (1.676 m), weight 164 lb 3.9 oz (74.5 kg), SpO2 94 %. Temp (24hrs), Av.2 °F (36.8 °C), Min:97.4 °F (36.3 °C), Max:98.9 °F (37.2 °C)      Intake and Output:   1901 -  0700  In: 1000 [I.V.:1000]  Out: 850 [Urine:850]    Physical Exam:  Sitting up in bed. NAD. Pleasant and cooperative  WDWN, normal conversation  HEENT; very slt right facial weakness  Comprehension, memory, recall and attn intact  Speech is clear, no word finding deficits or aphasia    Neck - Supple without masses, no bruits   Cardiovascular - Regular rate and rhythm. Normal S1, S2 without murmurs, rubs, or gallops. Lungs - Clear to auscultation. Abdomen - Soft, nontender with normal bowel sounds. Extremities - Peripheral pulses intact. No edema and no rashes.   NEURO; PERRLA, EOMI  Right sided prox weakness with shoulder flexion 4-   4 vs 5 on left  Bilateral LE prox weakness more so on right 4- vs 4+  Distally 5/5  No pathologic reflexes  dtrs symmetrical, normoactive  Sensation intact  Mild drift RUE , no dysmetria    Labs/Studies:  Recent Results (from the past 72 hour(s))   CBC WITH AUTOMATED DIFF    Collection Time: 20 11:35 AM   Result Value Ref Range    WBC 7.6 4.3 - 11.1 K/uL    RBC 4.66 4.05 - 5.2 M/uL    HGB 13.9 11.7 - 15.4 g/dL    HCT 42.8 35.8 - 46.3 %    MCV 91.8 79.6 - 97.8 FL    MCH 29.8 26.1 - 32.9 PG    MCHC 32.5 31.4 - 35.0 g/dL    RDW 13.9 11.9 - 14.6 %    PLATELET 277 594 - 165 K/uL    MPV 8.5 (L) 9.4 - 12.3 FL    ABSOLUTE NRBC 0.00 0.0 - 0.2 K/uL    DF AUTOMATED      NEUTROPHILS 71 43 - 78 %    LYMPHOCYTES 20 13 - 44 %    MONOCYTES 6 4.0 - 12.0 %    EOSINOPHILS 2 0.5 - 7.8 %    BASOPHILS 1 0.0 - 2.0 %    IMMATURE GRANULOCYTES 0 0.0 - 5.0 % ABS. NEUTROPHILS 5.4 1.7 - 8.2 K/UL    ABS. LYMPHOCYTES 1.5 0.5 - 4.6 K/UL    ABS. MONOCYTES 0.5 0.1 - 1.3 K/UL    ABS. EOSINOPHILS 0.1 0.0 - 0.8 K/UL    ABS. BASOPHILS 0.1 0.0 - 0.2 K/UL    ABS. IMM. GRANS. 0.0 0.0 - 0.5 K/UL   METABOLIC PANEL, COMPREHENSIVE    Collection Time: 03/19/20 11:35 AM   Result Value Ref Range    Sodium 131 (L) 136 - 145 mmol/L    Potassium 3.5 3.5 - 5.1 mmol/L    Chloride 100 98 - 107 mmol/L    CO2 25 21 - 32 mmol/L    Anion gap 6 (L) 7 - 16 mmol/L    Glucose 296 (H) 65 - 100 mg/dL    BUN 10 8 - 23 MG/DL    Creatinine 0.90 0.6 - 1.0 MG/DL    GFR est AA >60 >60 ml/min/1.73m2    GFR est non-AA >60 >60 ml/min/1.73m2    Calcium 7.9 (L) 8.3 - 10.4 MG/DL    Bilirubin, total 0.2 0.2 - 1.1 MG/DL    ALT (SGPT) 15 12 - 65 U/L    AST (SGOT) 13 (L) 15 - 37 U/L    Alk. phosphatase 69 50 - 130 U/L    Protein, total 6.2 (L) 6.3 - 8.2 g/dL    Albumin 2.8 (L) 3.2 - 4.6 g/dL    Globulin 3.4 2.3 - 3.5 g/dL    A-G Ratio 0.8 (L) 1.2 - 3.5     PROTHROMBIN TIME + INR    Collection Time: 03/19/20 11:35 AM   Result Value Ref Range    Prothrombin time 14.8 (H) 12.0 - 14.7 sec    INR 1.1     TROPONIN I    Collection Time: 03/19/20 11:35 AM   Result Value Ref Range    Troponin-I, Qt. <0.02 (L) 0.02 - 0.05 NG/ML   GLUCOSE, POC    Collection Time: 03/19/20 11:43 AM   Result Value Ref Range    Glucose (POC) 299 (H) 65 - 100 mg/dL   EKG, 12 LEAD, INITIAL    Collection Time: 03/19/20 11:46 AM   Result Value Ref Range    Ventricular Rate 106 BPM    Atrial Rate 106 BPM    P-R Interval 150 ms    QRS Duration 74 ms    Q-T Interval 358 ms    QTC Calculation (Bezet) 475 ms    Calculated P Axis 77 degrees    Calculated R Axis 68 degrees    Calculated T Axis 80 degrees    Diagnosis       !! AGE AND GENDER SPECIFIC ECG ANALYSIS !! Sinus tachycardia  Possible Left atrial enlargement  Borderline ECG  When compared with ECG of 15-JUL-2019 07:55,  Vent.  rate has increased BY  42 BPM  T wave inversion less evident in Anterior leads  Confirmed by ASHA SANTOS (), [place] Monday ([id number]) on 3/19/2020 1:55:55 PM     GLUCOSE, POC    Collection Time: 03/19/20  9:46 PM   Result Value Ref Range    Glucose (POC) 108 (H) 65 - 212 mg/dL   METABOLIC PANEL, BASIC    Collection Time: 03/20/20  3:26 AM   Result Value Ref Range    Sodium 140 136 - 145 mmol/L    Potassium 3.8 3.5 - 5.1 mmol/L    Chloride 106 98 - 107 mmol/L    CO2 25 21 - 32 mmol/L    Anion gap 9 7 - 16 mmol/L    Glucose 104 (H) 65 - 100 mg/dL    BUN 6 (L) 8 - 23 MG/DL    Creatinine 0.68 0.6 - 1.0 MG/DL    GFR est AA >60 >60 ml/min/1.73m2    GFR est non-AA >60 >60 ml/min/1.73m2    Calcium 8.4 8.3 - 10.4 MG/DL   GLUCOSE, POC    Collection Time: 03/20/20  8:13 AM   Result Value Ref Range    Glucose (POC) 111 (H) 65 - 100 mg/dL   GLUCOSE, POC    Collection Time: 03/20/20 11:16 AM   Result Value Ref Range    Glucose (POC) 197 (H) 65 - 100 mg/dL         Ambulation:  Activity and Safety  Activity Level: Up with Assistance  Ambulate: No (Comment)  Activity: In bed, Resting quietly  Activity Assistance: Partial (one person)  Weight Bearing Status: WBAT (Weight Bearing as Tolerated)  Mode of Transportation: Oxygen, IV equipment, Wheelchair  Repositioned: Supine (back)  Patient Turned: Turns self  Head of Bed Elevated: Self regulated  Activity Response: Tolerated well  Assistive Device: Fall prevention device  Safety Measures: Bed/Chair alarm on, Bed/Chair-Wheels locked, Bed in low position, Call light within reach, Side rails X 3     Impression/Plan:     Active Problems:    CVA (cerebral vascular accident) ([address] (3/19/2020)      L MCA stroke s/p TPA     Recommendations: Continue Acute Rehab Program  Coordination of rehab/medical care  Counseling of PM & R care issues management  Monitoring and management of medical conditions per plan of care/orders   -await PT/OT evals. No speech needs. Mild right sided prox weakness. IRC not indicated. Should do very well. Discussed risk factors.    -ECHO pending  -ASA 24hrs post TPA. Avoid hypotension; she has had hypotension with SBP in the 90s, needs better perfusion with SBP up to 180. Cont Pravachol  -no smoking  -Dr Ernestina Arias recommending malignancy screen with CTchest/abd/pelvis. Pulm nodules known. Had noted in Sept 2019; followed by [facility] for COPD as well.    Discussion with pt/Staff  Reviewed Therapies/Labs/Meds/Records  Woody Gutierrez MD

## 2020-03-20 NOTE — CDMP QUERY
Patient admitted with CVA. Patient noted to have hx of COPD and is prescribed O2 3L am and 2L pm. If possible, please document in the progress notes and d/c summary if you are evaluating and / or treating any of the following: 
 
? Chronic respiratory failure with hypoxia ? Chronic respiratory failure with hypercapnia ? Chronic respiratory failure, unspecified ? Other, please specify ? Clinically unable to determine The medical record reflects the following: 
   Risk Factors: COPD Clinical Indicators: PTA  meds with 3L O2 am and 2L O2 pm, O2 sats >92% on 2L while inpatient Treatment: 2-3L O2 nasal cannula Jesus, Jazmyn Winn RN, BSN, CDS Clinical Documentation Improvement 
[phone]

## 2020-03-21 ENCOUNTER — APPOINTMENT (OUTPATIENT)
Dept: CT IMAGING | Age: 60
DRG: 065 | End: 2020-03-21
Attending: FAMILY MEDICINE
Payer: COMMERCIAL

## 2020-03-21 PROBLEM — J96.11 CHRONIC RESPIRATORY FAILURE WITH HYPOXIA (HCC): Status: ACTIVE | Noted: 2020-03-21

## 2020-03-21 PROBLEM — Z99.81 DEPENDENCE ON CONTINUOUS SUPPLEMENTAL OXYGEN: Chronic | Status: ACTIVE | Noted: 2018-04-18

## 2020-03-21 PROBLEM — J96.11 CHRONIC RESPIRATORY FAILURE WITH HYPOXIA (HCC): Chronic | Status: ACTIVE | Noted: 2020-03-21

## 2020-03-21 LAB
CREAT SERPL-MCNC: 0.64 MG/DL (ref 0.6–1)
GLUCOSE BLD STRIP.AUTO-MCNC: 119 MG/DL (ref 65–100)
GLUCOSE BLD STRIP.AUTO-MCNC: 127 MG/DL (ref 65–100)
GLUCOSE BLD STRIP.AUTO-MCNC: 201 MG/DL (ref 65–100)
GLUCOSE BLD STRIP.AUTO-MCNC: 93 MG/DL (ref 65–100)

## 2020-03-21 PROCEDURE — 97535 SELF CARE MNGMENT TRAINING: CPT

## 2020-03-21 PROCEDURE — 94760 N-INVAS EAR/PLS OXIMETRY 1: CPT

## 2020-03-21 PROCEDURE — 74011636320 HC RX REV CODE- 636/320: Performed by: INTERNAL MEDICINE

## 2020-03-21 PROCEDURE — 74011250637 HC RX REV CODE- 250/637: Performed by: INTERNAL MEDICINE

## 2020-03-21 PROCEDURE — 77010033678 HC OXYGEN DAILY

## 2020-03-21 PROCEDURE — 97161 PT EVAL LOW COMPLEX 20 MIN: CPT

## 2020-03-21 PROCEDURE — 65270000029 HC RM PRIVATE

## 2020-03-21 PROCEDURE — 97110 THERAPEUTIC EXERCISES: CPT

## 2020-03-21 PROCEDURE — 74011000258 HC RX REV CODE- 258: Performed by: INTERNAL MEDICINE

## 2020-03-21 PROCEDURE — 99231 SBSQ HOSP IP/OBS SF/LOW 25: CPT | Performed by: PSYCHIATRY & NEUROLOGY

## 2020-03-21 PROCEDURE — 97165 OT EVAL LOW COMPLEX 30 MIN: CPT

## 2020-03-21 PROCEDURE — 71260 CT THORAX DX C+: CPT

## 2020-03-21 PROCEDURE — 74011636637 HC RX REV CODE- 636/637: Performed by: INTERNAL MEDICINE

## 2020-03-21 PROCEDURE — 74011000250 HC RX REV CODE- 250: Performed by: INTERNAL MEDICINE

## 2020-03-21 PROCEDURE — 94640 AIRWAY INHALATION TREATMENT: CPT

## 2020-03-21 PROCEDURE — 36415 COLL VENOUS BLD VENIPUNCTURE: CPT

## 2020-03-21 PROCEDURE — 82962 GLUCOSE BLOOD TEST: CPT

## 2020-03-21 PROCEDURE — 82565 ASSAY OF CREATININE: CPT

## 2020-03-21 PROCEDURE — 74011250637 HC RX REV CODE- 250/637: Performed by: NURSE PRACTITIONER

## 2020-03-21 PROCEDURE — 97530 THERAPEUTIC ACTIVITIES: CPT

## 2020-03-21 PROCEDURE — 74011250636 HC RX REV CODE- 250/636: Performed by: INTERNAL MEDICINE

## 2020-03-21 RX ORDER — SODIUM CHLORIDE 0.9 % (FLUSH) 0.9 %
10 SYRINGE (ML) INJECTION
Status: DISCONTINUED | OUTPATIENT
Start: 2020-03-21 | End: 2020-03-21 | Stop reason: SDUPTHER

## 2020-03-21 RX ORDER — ATORVASTATIN CALCIUM 40 MG/1
80 TABLET, FILM COATED ORAL DAILY
Status: DISCONTINUED | OUTPATIENT
Start: 2020-03-21 | End: 2020-03-22 | Stop reason: HOSPADM

## 2020-03-21 RX ORDER — CLOPIDOGREL BISULFATE 75 MG/1
300 TABLET ORAL
Status: COMPLETED | OUTPATIENT
Start: 2020-03-21 | End: 2020-03-21

## 2020-03-21 RX ORDER — CLOPIDOGREL BISULFATE 75 MG/1
75 TABLET ORAL DAILY
Status: DISCONTINUED | OUTPATIENT
Start: 2020-03-22 | End: 2020-03-22 | Stop reason: HOSPADM

## 2020-03-21 RX ORDER — SODIUM CHLORIDE 0.9 % (FLUSH) 0.9 %
10 SYRINGE (ML) INJECTION
Status: COMPLETED | OUTPATIENT
Start: 2020-03-21 | End: 2020-03-21

## 2020-03-21 RX ORDER — HEPARIN SODIUM 5000 [USP'U]/ML
5000 INJECTION, SOLUTION INTRAVENOUS; SUBCUTANEOUS EVERY 8 HOURS
Status: DISCONTINUED | OUTPATIENT
Start: 2020-03-21 | End: 2020-03-22 | Stop reason: HOSPADM

## 2020-03-21 RX ORDER — GUAIFENESIN 100 MG/5ML
81 LIQUID (ML) ORAL DAILY
Status: DISCONTINUED | OUTPATIENT
Start: 2020-03-21 | End: 2020-03-22 | Stop reason: HOSPADM

## 2020-03-21 RX ORDER — GUAIFENESIN 100 MG/5ML
81 LIQUID (ML) ORAL DAILY
Status: DISCONTINUED | OUTPATIENT
Start: 2020-03-21 | End: 2020-03-21

## 2020-03-21 RX ADMIN — LORAZEPAM 0.5 MG: 0.5 TABLET ORAL at 20:50

## 2020-03-21 RX ADMIN — BUDESONIDE 500 MCG: 0.5 INHALANT RESPIRATORY (INHALATION) at 08:15

## 2020-03-21 RX ADMIN — IOPAMIDOL 100 ML: 755 INJECTION, SOLUTION INTRAVENOUS at 14:24

## 2020-03-21 RX ADMIN — Medication 5 ML: at 05:42

## 2020-03-21 RX ADMIN — ASPIRIN 81 MG 81 MG: 81 TABLET ORAL at 14:36

## 2020-03-21 RX ADMIN — DIATRIZOATE MEGLUMINE AND DIATRIZOATE SODIUM 15 ML: 660; 100 LIQUID ORAL; RECTAL at 06:26

## 2020-03-21 RX ADMIN — HEPARIN SODIUM 5000 UNITS: 5000 INJECTION INTRAVENOUS; SUBCUTANEOUS at 14:36

## 2020-03-21 RX ADMIN — HEPARIN SODIUM 5000 UNITS: 5000 INJECTION INTRAVENOUS; SUBCUTANEOUS at 20:51

## 2020-03-21 RX ADMIN — LORAZEPAM 0.5 MG: 0.5 TABLET ORAL at 08:00

## 2020-03-21 RX ADMIN — BUDESONIDE 500 MCG: 0.5 INHALANT RESPIRATORY (INHALATION) at 21:14

## 2020-03-21 RX ADMIN — SODIUM CHLORIDE 100 ML: 900 INJECTION, SOLUTION INTRAVENOUS at 14:24

## 2020-03-21 RX ADMIN — Medication 10 ML: at 14:24

## 2020-03-21 RX ADMIN — INSULIN LISPRO 4 UNITS: 100 INJECTION, SOLUTION INTRAVENOUS; SUBCUTANEOUS at 16:33

## 2020-03-21 RX ADMIN — CITALOPRAM HYDROBROMIDE 40 MG: 20 TABLET ORAL at 07:53

## 2020-03-21 RX ADMIN — CLOPIDOGREL BISULFATE 300 MG: 75 TABLET ORAL at 16:32

## 2020-03-21 RX ADMIN — Medication 10 ML: at 22:16

## 2020-03-21 RX ADMIN — ATORVASTATIN CALCIUM 80 MG: 40 TABLET, FILM COATED ORAL at 14:36

## 2020-03-21 RX ADMIN — ISOSORBIDE MONONITRATE 120 MG: 30 TABLET, EXTENDED RELEASE ORAL at 08:04

## 2020-03-21 RX ADMIN — BENZONATATE 100 MG: 100 CAPSULE ORAL at 08:00

## 2020-03-21 RX ADMIN — Medication 10 ML: at 14:39

## 2020-03-21 RX ADMIN — DILTIAZEM HYDROCHLORIDE 240 MG: 120 CAPSULE, COATED, EXTENDED RELEASE ORAL at 08:00

## 2020-03-21 NOTE — PROGRESS NOTES
Problem: Mobility Impaired (Adult and Pediatric)  Goal: *Therapy Goal (Edit Goal, Insert Text)  Outcome: Progressing Towards Goal  Note:     LTG:  (1.)Ms. Yoanna Reddy will move from supine to sit and sit to supine , scoot up and down, and roll side to side in bed with INDEPENDENT within 4 treatment day(s). (2.)Ms. Yoanna Reddy will transfer from bed to chair and chair to bed with INDEPENDENT using the least restrictive device within 4 treatment day(s). (3.)Ms. Yoanna Reddy will ambulate with INDEPENDENT for 1000 feet with the least restrictive device within 4 treatment day(s). ________________________________________________________________________________________________       PHYSICAL THERAPY: Initial Assessment and AM 3/21/2020  INPATIENT: PT Visit Days : 1  Payor: Lisha Hyman / Plan: Yaniv Other PPO / Product Type: PPO /       NAME/AGE/GENDER: Alvaro Burris is a 61 y.o. female   PRIMARY DIAGNOSIS: CVA (cerebral vascular accident) ([address] [I63.9] CVA (cerebral vascular accident) ([address] CVA (cerebral vascular accident) ([address]        ICD-10: Treatment Diagnosis:    Generalized Muscle Weakness (M62.81)  Other lack of cordination (R27.8)  Difficulty in walking, Not elsewhere classified (R26.2)  Other abnormalities of gait and mobility (R26.89)   Precaution/Allergies:  Latex; Latex, natural rubber; Anoro ellipta [umeclidinium-vilanterol]; Egg; Avelox [moxifloxacin]; Banana; Moxifloxacin hcl; Sudafed [pseudoephedrine hcl]; Valium [diazepam]; and Watermelon      ASSESSMENT:     Ms. Yoanna Reddy is a pleasant middle aged female with above diagnosis who demonstrates with decreased transfers, ambulation and mobility below her prior functional baseline. All transfers are currently limited modified independent x 1 out of bed to sit and stand followed by ambulation for 500 feet in room and corral. She then returns to room and is seated bedside chair and performs therapeutic exercise.   Skilled PT is indicated for this patient's functional mobility deficits. Patient requires cuing to perform exercises correctly. Overall good progress towards physical therapy goals is anticipated. Goals listed above are appropriate. PT will continue efforts as patient is still below functional baseline. ?????? ? ? This section established at most recent assessment??????????   PROBLEM LIST (Impairments causing functional limitations):  Decreased Strength affecting function   Decreased Transfer Abilities   Decreased Ambulation Ability/Technique   Decreased Balance   Decreased Activity Tolerance   Decreased Pacing Skills   Increased Fatigue affecting function   Decreased Flexibility/Joint Mobility   Decreased [place] with Home Exercise Program   REHABILITATION POTENTIAL FOR STATED GOALS: GOOD  PLAN OF CARE:INTERVENTIONS PLANNED: (Benefits and precautions of physical therapy have been discussed with the patient.)  balance exercise   bed mobility   family education   gait training   range of motion: active/assisted/passive   therapeutic activities   therapeutic exercise/strengthening   transfer training   FREQUENCY/DURATION: Follow patient 3 x's per week for until goals are met in order to address above goals. REHAB RECOMMENDATIONS (at time of discharge pending progress):    Placement: It is my opinion, based on this patient's performance to date, that Ms. Gerhard Greer may benefit from being discharged with NO further skilled therapy due to a proven ability to function at baseline. Equipment:   None at this time              HISTORY:   History of Present Injury/Illness (Reason for Referral):  PER MD H&P :  Patient is a 80-year-old female with a past medical history of COPD (supposed be on 2 L oxygen at home), fibromyalgia, tobacco abuse, hyperlipidemia, IBS, HTN, diabetes mellitus, CAD, and several back surgeries who presents to the emergency department with right facial droop and dysarthria.   Patient reportedly started having symptoms between 1030 and 11 AM.  Patient reports her daughter noticed a right facial droop. This progressed to right arm weakness and right leg weakness. Patient still had symptoms on arrival to emergency department. There was delay and evaluation by tele-neurology. Stat head CT was negative for acute pathology. Stat head CTA showed High-grade stenosis versus occlusion involving the anterior M2 branch of the left MCA. Neuro interventionalists did not recommend intervention. Patient was recommended as TPA candidate. Patient was seen and examined during TPA infusion. Patient at that time reported improvement in facial droop and dysarthria. She denied any chest pain, palpitations, or shortness of breath. She denied any headache, dizziness, lightheadedness, changes in vision, or hearing difficulty. Patient examined again 10 to 15 minutes later with worsening of symptoms. Discussed with neurosurgery who recommended transfer to [facility] ED after repeat CT to evaluate for bleed. Patient was seen and examined at [facility] [address] ED. Past Medical History/Comorbidities:   Ms. Alka Burrows  has a past medical history of Acute renal failure ([address] (12/5/2013), Acute respiratory failure ([address] (12/5/2013), Adverse effect of anesthesia, Arthritis, Back pain, CAD (coronary artery disease), CAP (community acquired pneumonia) (12/7/2013), COPD exacerbation ([address] (6/8/2017), CVA (cerebral vascular accident) ([address] (3/19/2020), Cystitis, Diabetes ([address] Difficult intubation (2005), Hypertension, Hypoproteinemia ([address] (12/11/2013), Hypotension (12/5/2013), Hypoxemia (12/20/2013), Ill-defined condition, Migraine headache, Myalgia, Pleural effusion (12/17/2013), Positive occult stool blood test (12/9/2013), Psychiatric disorder, Respiratory failure ([address] (12/2013), Sepsis ([address] (12/7/2013), Septicemia [facility]) (Dec 2013), Unspecified adverse effect of anesthesia, and Upper GI bleed (12/10/2013).  She also has no past medical history of Arrhythmia, Asthma, Autoimmune disease ([facility] Cancer ([facility] Chronic kidney disease, Heart failure ([facility] Malignant hyperthermia due to anesthesia, Nausea & vomiting, Pseudocholinesterase deficiency, PUD (peptic ulcer disease), Seizures ([facility] Sleep apnea, Thromboembolus ([facility] or Thyroid disease. Ms. Betty Sterling  has a past surgical history that includes hx cholecystectomy; hx hysterectomy; hx orthopaedic; hx orthopaedic (Right, 10/2014); neurological procedure unlisted (2000 & 2005); hx heart catheterization (2011); hx heart catheterization (12/29/2016); and hx heart catheterization (07/15/2019). Social History/Living Environment:   Home Environment: Private residence  # Steps to Enter: 0  One/Two Story Residence: One story  Living Alone: No  Support Systems: Spouse/Significant Other/Partner, Child(paul), Family member(s)  Patient Expects to be Discharged to[de-identified] Private residence  Current DME Used/Available at Home: Wheelchair, [place], straight, Grab bars, Other (comment)(only uses WC or SC when LB is painful or she is tired)  Tub or Shower Type: Tub/Shower combination(sits in bath due to vertigo)  Prior Level of Function/Work/Activity:  No significant limitations. Dominant Side:         RIGHT    Personal Factors:          Sex:  female        Age:  61 y.o.    Number of Personal Factors/Comorbidities that affect the Plan of Care: 0: LOW COMPLEXITY   EXAMINATION:   Most Recent Physical Functioning:   Gross Assessment:                  Posture:  Posture (WDL): Exceptions to WDL  Posture Assessment: Cervical, Forward head  Balance:  Sitting: Intact  Standing: Impaired  Standing - Static: Fair  Standing - Dynamic : Fair Bed Mobility:  Rolling: Modified independent  Supine to Sit: Modified independent  Sit to Supine: Modified independent  Scooting: Modified independent  Wheelchair Mobility:     Transfers:  Sit to Stand: Modified independent  Stand to Sit: Modified independent  Bed to Chair: Independent;Supervision  Gait:     Base of Support: Center of gravity altered  Speed/Joan: Accelerated; Fluctuations; Pace decreased (<100 feet/min); Shuffled; Slow  Step Length: Left shortened;Right shortened  Swing Pattern: Left asymmetrical;Right asymmetrical  Stance: Left decreased;Right decreased;Time;Weight shift  Gait Abnormalities: Decreased step clearance;Shuffling gait; Steppage gait; Step to gait;Trunk sway increased  Distance (ft): 500 Feet (ft)  Assistive Device: (none)  Ambulation - Level of Assistance: Modified independent  Interventions: Manual cues; Safety awareness training; Tactile cues; Verbal cues; Visual/Demos      Body Structures Involved:  Bones  Joints  Muscles  Ligaments Body Functions Affected:  Neuromusculoskeletal  Movement Related  Skin Related  Metobolic/Endocrine Activities and Participation Affected:  General Tasks and Demands  Communication  Mobility  Self Care  [facility] and [address] [place] [address]   Number of elements that affect the Plan of Care: 1-2: LOW COMPLEXITY   CLINICAL PRESENTATION:   Presentation: Stable and uncomplicated: LOW COMPLEXITY   CLINICAL DECISION MAKIN[address] [place] [id number] AM-PAC 6 Clicks   Basic Mobility Inpatient Short Form  How much difficulty does the patient currently have. .. Unable A Lot A Little None   1. Turning over in bed (including adjusting bedclothes, sheets and blankets)? [] 1   [] 2   [] 3   [x] 4   2. Sitting down on and standing up from a chair with arms ( e.g., wheelchair, bedside commode, etc.)   [] 1   [] 2   [] 3   [x] 4   3. Moving from lying on back to sitting on the side of the bed? [] 1   [] 2   [] 3   [x] 4   How much help from another person does the patient currently need. .. Total A Lot A Little None   4. Moving to and from a bed to a chair (including a wheelchair)? [] 1   [] 2   [x] 3   [] 4   5. Need to walk in hospital room? [] 1   [] 2   [x] 3   [] 4   6. Climbing 3-5 steps with a railing?    [] 1   [] 2   [x] 3   [] 4   © , Trustees of [address] [place] [address] [address] [address], under license to [employer]. All rights reserved      Score:  Initial: 21 Most Recent: X (Date: -- )    Interpretation of Tool:  Represents activities that are increasingly more difficult (i.e. Bed mobility, Transfers, Gait). Medical Necessity:     Patient demonstrates   good   rehab potential due to higher previous functional level. Reason for Services/Other Comments:  Patient continues to require skilled intervention due to   medical complications, patient unable to attend/participate in therapy as expected, and decreased transfers, ambulation and mobility. .   Use of outcome tool(s) and clinical judgement create a POC that gives a: Clear prediction of patient's progress: LOW COMPLEXITY            TREATMENT:   (In addition to Assessment/Re-Assessment sessions the following treatments were rendered)   Pre-treatment Symptoms/Complaints:  0/10 pain reported. Pain: Initial:     0/10 no pain reported. Post Session:  0/10 no pain reported. Therapeutic Activity: (    15 mins): Therapeutic activities including Bed transfers, Chair transfers, and Ambulation on level ground to improve mobility, strength, balance, and coordination. Required minimal Manual cues; Safety awareness training; Tactile cues; Verbal cues; Visual/Demos to promote coordination of bilateral, lower extremity(s). Therapeutic Exercise: ( 8 mins):  Exercises per grid below to improve mobility, strength, balance, and coordination. Required minimal visual, verbal, and manual cues to promote proper body alignment and promote proper body posture. Progressed repetitions as indicated. Date:  3/21/2020  Date:   Date:     Activity/Exercise Parameters Parameters Parameters   AP's  20 x's     HIP ABD 20 x's     LAQ's  20 x's     Marches  20 x's                         Braces/Orthotics/Lines/Etc:   O2 Device: Nasal cannula  Treatment/Session Assessment:    Response to Treatment:  improved mobility and transfers.      Interdisciplinary Collaboration:   Physical Therapist  Registered Nurse  After treatment position/precautions:   Up in chair  Bed alarm/tab alert on  Bed/Chair-wheels locked  Bed in low position  Call light within reach  RN notified  Side rails x 3   Compliance with Program/Exercises: Will assess as treatment progresses  Recommendations/Intent for next treatment session: \"Next visit will focus on advancements to more challenging activities, reduction in assistance provided, and transfers, ambulation and mobility. \".   Total Treatment Duration:  PT Patient Time In/Time Out  Time In: 1015  Time Out: [address] [place], [place]

## 2020-03-21 NOTE — PROGRESS NOTES
Date of Outreach Update:  Francine Mazariegos was seen and assessed. MEWS Score: 2 (03/21/20 0400)  Vitals:    03/21/20 0000 03/21/20 0400 03/21/20 0800 03/21/20 0815   BP: 130/80 111/64 94/55    Pulse: 76 78 85    Resp: 24 24 22    Temp: 97.9 °F (36.6 °C) 97.6 °F (36.4 °C) 97.6 °F (36.4 °C)    SpO2: 96% 95% 92% 97%   Weight:       Height:             Pain Assessment  Pain Intensity 1: 0 (03/20/20 2000)        Patient Stated Pain Goal: 0      Previous Outreach assessment has been reviewed. There have been no significant clinical changes since the completion of the last dated Outreach assessment. Patient alert and oriented. PERRL. Moves all extremities purposefully. Denies numbness. Mild right sided weakness. VS, labs, and progress notes reviewed. Will continue to follow up per outreach protocol.     Signed By:   Naldo Magallon RN    March 21, 2020 8:45 AM

## 2020-03-21 NOTE — PROGRESS NOTES
Problem: Falls - Risk of  Goal: *Absence of Falls  Description: Document Rudolph Phipps Fall Risk and appropriate interventions in the flowsheet.   Outcome: Progressing Towards Goal  Note: Fall Risk Interventions:            Medication Interventions: Bed/chair exit alarm, Patient to call before getting OOB, Teach patient to arise slowly    Elimination Interventions: Bed/chair exit alarm, Call light in reach, Patient to call for help with toileting needs, Toilet paper/wipes in reach              Problem: Patient Education: Go to Patient Education Activity  Goal: Patient/Family Education  Outcome: Progressing Towards Goal     Problem: TIA/CVA Stroke: Day 2 Until Discharge  Goal: Activity/Safety  Outcome: Progressing Towards Goal  Goal: Diagnostic Test/Procedures  Outcome: Progressing Towards Goal  Goal: Nutrition/Diet  Outcome: Progressing Towards Goal  Goal: Discharge Planning  Outcome: Progressing Towards Goal  Goal: Medications  Outcome: Progressing Towards Goal  Goal: Respiratory  Outcome: Progressing Towards Goal  Goal: Treatments/Interventions/Procedures  Outcome: Progressing Towards Goal  Goal: Psychosocial  Outcome: Progressing Towards Goal  Goal: *Verbalizes anxiety and depression are reduced or absent  Outcome: Progressing Towards Goal  Goal: *Absence of aspiration  Outcome: Progressing Towards Goal  Goal: *Absence of deep venous thrombosis signs and symptoms(Stroke Metric)  Outcome: Progressing Towards Goal  Goal: *Optimal pain control at patient's stated goal  Outcome: Progressing Towards Goal  Goal: *Tolerating diet  Outcome: Progressing Towards Goal  Goal: *Ability to perform ADLs and demonstrates progressive mobility and function  Outcome: Progressing Towards Goal  Goal: *Stroke education continued(Stroke Metric)  Outcome: Progressing Towards Goal

## 2020-03-21 NOTE — PROGRESS NOTES
Hospitalist Note     Admit Date:  3/19/2020  3:03 PM   Name:  Christina Hernández   Age:  61 y.o.  :  1960   MRN:  [id number]   PCP:  Jesi Sapp MD  Treatment Team: Attending Provider: Gisel Stevens MD; Nurse Practitioner: Renetta Huston NP; Consulting Provider: José Miguel Goodson DO; Care Manager: David Miller, RN; Consulting Provider: Veva Shone, MD; Utilization Review: Nikita Rivera RN; Primary Nurse: Mia Jackson RN; Physical Therapist: Kyrie Carranza PT; Occupational Therapist: Alexandra Velarde OT    HPI/Subjective:   Pt is a 62 y/o F with COPD on 2L NC, DM, HTN, smoker, who was admitted with CVA. presented to ER at [facility] with R facial droop and weakness. Found to have high grade stenosis/occlusion on CTA in anterior M2 branch L MCA. Given TPA with some improvement in symptoms and transferred downtown for ICU monitoring and neuro follow-up. 3/21 - pt feels OK today. Says only symptoms is mild R hand weakness. Denies issues with ambulation. Awaiting panCT.     No other complaints  Objective:     Patient Vitals for the past 24 hrs:   Temp Pulse Resp BP SpO2   20 0815     97 %   20 0800 97.6 °F (36.4 °C) 85 22 94/55 92 %   20 0400 97.6 °F (36.4 °C) 78 24 111/64 95 %   20 0000 97.9 °F (36.6 °C) 76 24 130/80 96 %   20 2142     96 %   20 2000 97.7 °F (36.5 °C) 78 24 122/71 97 %   20 1818 98.1 °F (36.7 °C) 94 24 117/60 96 %   20 1730  91 24 119/60 93 %   20 1602 98.7 °F (37.1 °C) 83 15 117/58 94 %   20 1531  79 21 110/52 92 %   20 1502  87 23 103/55 93 %   20 1455  87 29 99/58    03/20/20 1417  79 20 99/50 93 %   20 1331  93 24 101/57 94 %   20 1302  95 23 105/56 94 %   20 1231  80 18 110/55 96 %   20 1202 98.3 °F (36.8 °C) 78 18 103/55 96 %   20 1131  88 23 100/56 93 %   20 1102  94 26 101/55 94 %   20 1031  91 30 94/58 94 %   03/20/20 1002  85 25 106/60 94 %   03/20/20 0931  91 23 104/59 94 %     Oxygen Therapy  O2 Sat (%): 97 % (03/21/20 0815)  Pulse via Oximetry: 77 beats per minute (03/21/20 0815)  O2 Device: Nasal cannula (03/20/20 2142)  O2 Flow Rate (L/min): 2 l/min (03/21/20 0815)  FIO2 (%): 28 % (03/19/20 1912)    Estimated body mass index is 26.51 kg/m² as calculated from the following:    Height as of an earlier encounter on 3/19/20: 5' 6\" (1.676 m). Weight as of this encounter: 74.5 kg (164 lb 3.9 oz). Intake/Output Summary (Last 24 hours) at 3/21/2020 9260  Last data filed at 3/20/2020 1735  Gross per 24 hour   Intake 120 ml   Output 800 ml   Net -680 ml       *Note that automatically entered I/Os may not be accurate; dependent on patient compliance with collection and accurate  by techs. General:    Well nourished. Alert. CV:   RRR. No murmur, rub, or gallop. Lungs:   CTAB. No wheezing, rhonchi, or rales. Abdomen:   Soft, nontender, nondistended. Extremities: Warm and dry. No cyanosis or edema. Skin:     No rashes or jaundice.    Neuro:  No gross focal deficits    Data Review:  I have reviewed all labs, meds, and studies from the last 24 hours:    Recent Results (from the past 24 hour(s))   GLUCOSE, POC    Collection Time: 03/20/20 11:16 AM   Result Value Ref Range    Glucose (POC) 197 (H) 65 - 100 mg/dL   GLUCOSE, POC    Collection Time: 03/20/20  4:11 PM   Result Value Ref Range    Glucose (POC) 98 65 - 100 mg/dL   GLUCOSE, POC    Collection Time: 03/20/20  9:27 PM   Result Value Ref Range    Glucose (POC) 96 65 - 100 mg/dL   CREATININE AND GFR    Collection Time: 03/21/20  5:11 AM   Result Value Ref Range    Creatinine 0.64 0.6 - 1.0 MG/DL    GFR est AA >60 >60 ml/min/1.73m2    GFR est non-AA >60 >60 ml/min/1.73m2   GLUCOSE, POC    Collection Time: 03/21/20  7:13 AM   Result Value Ref Range    Glucose (POC) 127 (H) 65 - 100 mg/dL        All Micro Results     None          Current Meds:  Current Facility-Administered Medications   Medication Dose Route Frequency    sodium chloride 0.9 % bolus infusion 100 mL  100 mL IntraVENous RAD ONCE    saline peripheral flush soln 10 mL  10 mL InterCATHeter RAD ONCE    iopamidoL (ISOVUE-370) 76 % injection 100 mL  100 mL IntraVENous RAD ONCE    sodium chloride 0.9 % bolus infusion 100 mL  100 mL IntraVENous RAD ONCE    diatrizoate eliel-diatrizoat sod (MD-GASTROVIEW,GASTROGRAFIN) 66-10 % contrast solution 15 mL  15 mL Oral RAD ONCE    iopamidoL (ISOVUE-370) 76 % injection 100 mL  100 mL IntraVENous RAD ONCE    saline peripheral flush soln 10 mL  10 mL InterCATHeter RAD ONCE    budesonide (PULMICORT) 500 mcg/2 ml nebulizer suspension  500 mcg Nebulization BID RT    benzonatate (TESSALON) capsule 100 mg  100 mg Oral TID PRN    citalopram (CELEXA) tablet 40 mg  40 mg Oral DAILY    dilTIAZem CD (CARDIZEM CD) capsule 240 mg  240 mg Oral DAILY    isosorbide mononitrate ER (IMDUR) tablet 120 mg  120 mg Oral DAILY    LORazepam (ATIVAN) tablet 0.5 mg  0.5 mg Oral BID    meclizine (ANTIVERT) tablet 25 mg  25 mg Oral TID PRN    nitroglycerin (NITROSTAT) tablet 0.4 mg  0.4 mg SubLINGual Q5MIN PRN    pravastatin (PRAVACHOL) tablet 20 mg  20 mg Oral QHS    sodium chloride (NS) flush 5-40 mL  5-40 mL IntraVENous Q8H    sodium chloride (NS) flush 5-40 mL  5-40 mL IntraVENous PRN    ondansetron (ZOFRAN) injection 4 mg  4 mg IntraVENous Q6H PRN    insulin lispro (HUMALOG) injection   SubCUTAneous AC&HS       Other Studies:  Results for orders placed or performed during the hospital encounter of 20   2D ECHO COMPLETE ADULT (TTE) W OR [address] [facility] [address] [facility], [facility]  [phone]    Transthoracic Echocardiogram  2D, M-mode, Doppler, and Color Doppler    Patient: Tiffani Weber  MR #: [id number]  : 1960  Age: 61 years  Gender: Female  Study date: 20-Mar-2020  Account #: [id number]  Height: 66 in  Weight: 160.6 lb  BSA: 1.82 mï¾²  Status:Routine  Location: 3112  BP: 133/ 67    Allergies: LATEX, LATEX, NATURAL RUBBER, UMECLIDINIUM-VILANTEROL, EGG,  MOXIFLOXACIN, BANANA, MOXIFLOXACIN HCL, PSEUDOEPHEDRINE HCL, DIAZEPAM,  WATERMELON    Sonographer:  Celia Abarca [facility]  Group:  [facility]  Reading Physician:  Briana Pereyra MD [facility]    INDICATIONS: CVA    PROCEDURE: This was a routine study. A transthoracic echocardiogram was  performed. The study included complete 2D imaging, M-mode, complete spectral  Doppler, and color Doppler. Intravenous contrast (Definity, 1 ml) was  administered to opacify the left ventricle. Image quality was adequate. LEFT VENTRICLE: Size was normal. Systolic function was normal. Ejection  fraction was estimated in the range of 55 % to 60 %. There were no regional  wall motion abnormalities. Wall thickness was normal.    RIGHT VENTRICLE: The size was normal. Systolic function was normal.    LEFT ATRIUM: Size was normal.    ATRIAL SEPTUM: Contrast injection was performed. There was no right-to-left  shunt, with provocative maneuvers to increase right atrial pressure. RIGHT ATRIUM: Size was normal.    SYSTEMIC VEINS: IVC: The inferior vena cava was normal in size and course. AORTIC VALVE: The valve was structurally normal, tri-commissural. There was   no  evidence for stenosis. There was no insufficiency. MITRAL VALVE: Valve structure was normal. There was no evidence for stenosis. There was no regurgitation. TRICUSPID VALVE: The valve structure was normal. There was no evidence for  stenosis. There was no regurgitation. PULMONIC VALVE: The valve structure was normal. There was no evidence for  stenosis. There was no insufficiency. PERICARDIUM: There was no pericardial effusion. AORTA: The root exhibited normal size.     SUMMARY:    -  Left ventricle: Systolic function was normal. Ejection fraction was  estimated in the range of 55 % to 60 %. There were no regional wall motion  abnormalities. -  Atrial septum: Contrast injection was performed. There was no   right-to-left  shunt, with provocative maneuvers to increase right atrial pressure. SYSTEM MEASUREMENT TABLES    2D  Ao Diam: 3 cm  LA Diam: 3.2 cm  %FS: 34.7 %  IVSd: 1 cm  LVIDd: 4.6 cm  LVIDs: 3 cm  LVOT Diam: 2.1 cm  LVPWd: 1 cm  RVIDd: 2.5 cm    Prepared and signed by    Keri Jorgensen MD [facility]  Signed 20-Mar-2020 12:42:01         Ct Head Wo Cont    Result Date: 3/20/2020  Examination: CT scan of the brain without contrast. History: CVA s/p TPA, 61 years Female CVA s/p TPA Scan to be done at 1pm per order Technique: 5 mm axial imaging of the brain from the posterior fossa to the vertex. All CT scans at this location are performed using dose modulation techniques as appropriate to a performed exam including the following: automated exposure control; adjustment of the mA and/or kV according to patient's size (this includes techniques or standardized protocols for targeted exams where dose is matched to indication/reason for exam; i.e. extremities or head); use of iterative reconstruction technique. Comparison:  CT brain yesterday Findings: Probable mild microvascular disease unchanged. The ventricles, sulci are age-appropriate. No intracranial hemorrhage or extra-axial collection is identified. No evidence of acute infarct. No mass effect or midline shift is present. Basal cisterns are intact. The visualized paranasal sinuses and mastoid air cells are clear. The orbits, bones, and soft tissues are normal in appearance. Impression:  No acute intracranial abnormality seen on CT.       Assessment and Plan:     Hospital Problems as of 3/21/2020 Date Reviewed: 2/3/2020          Codes Class Noted - Resolved POA    Chronic respiratory failure with hypoxia (HCC) (Chronic) ICD-10-CM: J96.11  ICD-9-CM: 518.83, 799.02  3/21/2020 - Present Yes        * (Principal) CVA (cerebral vascular accident) [facility]) ICD-10-CM: I63.9  ICD-9-CM: 434.91  3/19/2020 - Present Yes        Dependence on continuous supplemental oxygen (Chronic) ICD-10-CM: Z99.81  ICD-9-CM: V46.2  4/18/2018 - Present Yes        Essential hypertension (Chronic) ICD-10-CM: I10  ICD-9-CM: 401.9  Unknown - Present Yes        Diabetes ([address] (Chronic) ICD-10-CM: E11.9  ICD-9-CM: 250.00  Unknown - Present Yes        COPD (chronic obstructive pulmonary disease) (HCC) (Chronic) ICD-10-CM: J44.9  ICD-9-CM: 877  6/8/2017 - Present Yes        Cigarette nicotine dependence with nicotine-induced disorder (Chronic) ICD-10-CM: F17.219  ICD-9-CM: 292.9  9/16/2016 - Present Yes        ASCVD (arteriosclerotic cardiovascular disease) (Chronic) ICD-10-CM: I25.10  ICD-9-CM: 429.2, 440.9  7/26/2016 - Present Yes              Plan:  CVA  -likely home later today after panCT  -cont statin. Started ASA  -defer plavix to neuro  -echo unremarkable  -PT/OT need to see today    Pulmonary nodules  -panCT pending  -pt has said she doesn't want and will never want chemo or any cancer treatments. Likely will just do serial surveillance.       HTN   -controlled    DM  -controlled    DC planning/Dispo:    -discharge soon    Diet:  DIET DIABETIC WITH OPTIONS  DVT ppx:  heparin    Signed:  Floyd Mcbride MD

## 2020-03-21 NOTE — PROGRESS NOTES
03/21/20 0704   NIH Stroke Scale   Interval Other (comment)  (Dual NIH w/ Al,RN)   LOC 0   LOC Questions 0   LOC Commands 0   Best Gaze 0   Visual 0   Facial Palsy 0   Motor Right Arm 0   Motor Left Arm 0   Motor Right Leg 1   Motor Left Leg 0   Limb Ataxia 0   Sensory 0   Best Language 0   Dysarthria 0   Extinction and Inattention 0   Total 1

## 2020-03-21 NOTE — PROGRESS NOTES
[facility] CRITICAL CARE OUTREACH NURSE PROGRESS REPORT      SUBJECTIVE: Called to assess patient secondary to transfer from critical care. MEWS Score: 2 (03/21/20 0000)  Vitals:    03/20/20 1818 03/20/20 2000 03/20/20 2142 03/21/20 0000   BP: 117/60 122/71  130/80   Pulse: 94 78  76   Resp: 24 24  24   Temp: 98.1 °F (36.7 °C) 97.7 °F (36.5 °C)  97.9 °F (36.6 °C)   SpO2: 96% 97% 96% 96%   Weight:       Height:              LAB DATA:    Recent Labs     03/20/20  0326 03/19/20  1135    131*   K 3.8 3.5    100   CO2 25 25   AGAP 9 6*   * 296*   BUN 6* 10   CREA 0.68 0.90   GFRAA >60 >60   GFRNA >60 >60   CA 8.4 7.9*   ALB  --  2.8*   TP  --  6.2*   GLOB  --  3.4   AGRAT  --  0.8*   ALT  --  15        Recent Labs     03/19/20  1135   WBC 7.6   HGB 13.9   HCT 42.8             OBJECTIVE: On arrival to room, I found patient to be resting quietly in bed. ASSESSMENT:  Pt alert and oriented. Denies HA, changes in vision or sensation. RLE drift present. No other neuro deficits noted. SpO2 97% on 2L NC. Lung sounds with slight wheezing. Pt denies SOB. Respirations even and unlabored. VS, labs, and progress notes reviewed. Primary RN without any additional needs/concerns at this time. PLAN:  Will continue to follow per outreach protocol.

## 2020-03-21 NOTE — PROGRESS NOTES
Problem: Self Care Deficits Care Plan (Adult)  Goal: *Acute Goals and Plan of Care (Insert Text)  Description: 1. Patient will complete lower body bathing and dressing with independent and adaptive equipment as needed. Goal met 3/21/2020  2. Patient will complete toileting with independently. Goal met 3/21/2020  3. Patient will tolerate 25 minutes of OT treatment with 1 rest breaks to increase activity tolerance for ADLs. Goal met 3/21/2020  4. Patient will complete functional transfers with independence and adaptive equipment as needed. Goal met 3/21/2020  5. Patient will complete grooming tasks in standing at sink level independently. Goal met 3/21/2020  6. Patient will safely retrieve item off all floor with no LOB in prep for home management. Goal met 3/21/2020    Timeframe: 1 visit    Outcome: Goals met/resolved       OCCUPATIONAL THERAPY: Initial Assessment, Daily Note, Discharge, and AM   3/21/2020  INPATIENT: OT Visit Days: 1  Payor: Asad Chase / Plan: Pema Frank PPO / Product Type: PPO /      NAME/AGE/GENDER: Karla Allen is a 61 y.o. female   PRIMARY DIAGNOSIS:  CVA (cerebral vascular accident) ([facility] [I63.9] CVA (cerebral vascular accident) ([facility] [address] CVA (cerebral vascular accident) ([facility] [address]        ICD-10: Treatment Diagnosis:    · Generalized Muscle Weakness (M62.81)  · Other lack of cordination (R27.8)   Precautions/Allergies:    Latex; Latex, natural rubber; Anoro ellipta [umeclidinium-vilanterol]; Egg; Avelox [moxifloxacin]; Banana; Moxifloxacin hcl; Sudafed [pseudoephedrine hcl]; Valium [diazepam]; and Watermelon      ASSESSMENT:     Ms. Alka Burrows is a 62 YO R dominant WF admitted with R facial droop, R UE weakness and word finding, received tPA and reports all symptoms have resolved with exception of slight R facial droop and some numbness in R fingertips. Imaging positive for L MCA CVA. A & O x4. No complaint of pain.   Reports she lives in 1 level home with no steps at entrance with her spouse, their 38 YO dtr and 15 YO granddtr. Pt disabled, driving, was independent with all self care tasks, has SC and WC but only used when her LB was hurting or she was tried, reports rare usage. Pt shared cooking with dtr. Granddtr does all laundry. Has T/S combo with GBs, but prefers tub batheing due to history of vertigo and dizziness. Has had no trouble getting into or out of tub. Reports \"a few almost falls\" where she caught herself usually on uneven surfaces, but no brigida falls. Today, pt independent with bed mobility, and ambulation in room. Pt completed full sponge bath in sitting and standing at sink level as below in ADL grid with no help from OT and was safe. Picked up sock off floor safely with no LOB and donned independently with crossed leg method. B UEs are WFLs for basic self care tasks, R UE only slightly weaker than L UE.  4-/5 shoulder flexion and grasp but functional. Rest is 4+-5/5 throughout. Pt reports slight tingling sensation in R fingertips but coordination is WFLs, Nathalia good, slight R UE drift but manageable. Hand writing legible and timely. Pt able to open all containers for grooming, washed her hair in the sink with her head upside down with no trouble, brushed it and styled it. OT educated pt on stroke signs and symptoms with good understanding. Donned underwear in standing, no LOB holding to sink. Pt returned to recliner and was left with all needs in reach. CNA changing bed linens. Pt is functioning very near her baseline and does not need any further skilled OT at this time. Pt in agreement. Expect pt to progress to her baseline quickly. This section established at most recent assessment   PROBLEM LIST (Impairments causing functional limitations):  1. Decreased Strength  2. Decreased Balance   INTERVENTIONS PLANNED: (Benefits and precautions of occupational therapy have been discussed with the patient.)  1. Activities of daily living training  2. Adaptive equipment training  3.  Balance training  4. Clothing management  5. Neuromuscular re-eduation  6. Therapeutic activity  7. Therapeutic exercise     TREATMENT PLAN: Frequency/Duration: Eval and DC, goals met today in 1 visit  Rehabilitation Potential For Stated Goals: Excellent     REHAB RECOMMENDATIONS (at time of discharge pending progress):    Placement: It is my opinion, based on this patient's performance to date, that Ms. Errol Jacobo may benefit from being discharged with NO further skilled therapy due to a proven ability to function at baseline, all goals being met, and the high likelihood of returning to baseline. Equipment:    None at this time              OCCUPATIONAL PROFILE AND HISTORY:   History of Present Injury/Illness (Reason for Referral):  Patient is a 49-year-old female with a past medical history of COPD (supposed be on 2 L oxygen at home), fibromyalgia, tobacco abuse, hyperlipidemia, IBS, HTN, diabetes mellitus, CAD, and several back surgeries who presents to the emergency department with right facial droop and dysarthria. Patient reportedly started having symptoms between 1030 and 11 AM.  Patient reports her daughter noticed a right facial droop. This progressed to right arm weakness and right leg weakness. Patient still had symptoms on arrival to emergency department. There was delay and evaluation by tele-neurology. Stat head CT was negative for acute pathology. Stat head CTA showed High-grade stenosis versus occlusion involving the anterior M2 branch of the left MCA. Neuro interventionalists did not recommend intervention. Patient was recommended as TPA candidate. Patient was seen and examined during TPA infusion. Patient at that time reported improvement in facial droop and dysarthria. She denied any chest pain, palpitations, or shortness of breath. She denied any headache, dizziness, lightheadedness, changes in vision, or hearing difficulty.   Patient examined again 10 to 15 minutes later with worsening of symptoms. Discussed with neurosurgery who recommended transfer to [facility] ED after repeat CT to evaluate for bleed. Patient was seen and examined [facility]. 10 systems reviewed and negative except as noted in HPI. Past Medical History/Comorbidities:   Ms. Kimi Delgado  has a past medical history of Acute renal failure ([address] (12/5/2013), Acute respiratory failure ([address] (12/5/2013), Adverse effect of anesthesia, Arthritis, Back pain, CAD (coronary artery disease), CAP (community acquired pneumonia) (12/7/2013), COPD exacerbation ([address] (6/8/2017), CVA (cerebral vascular accident) ([address] (3/19/2020), Cystitis, Diabetes ([address] Difficult intubation (2005), Hypertension, Hypoproteinemia ([address] (12/11/2013), Hypotension (12/5/2013), Hypoxemia (12/20/2013), Ill-defined condition, Migraine headache, Myalgia, Pleural effusion (12/17/2013), Positive occult stool blood test (12/9/2013), Psychiatric disorder, Respiratory failure ([address] (12/2013), Sepsis ([address] (12/7/2013), [facility] (Dec 2013), Unspecified adverse effect of anesthesia, and Upper GI bleed (12/10/2013). She also has no past medical history of Arrhythmia, Asthma, Autoimmune disease ([address] Cancer ([address] Chronic kidney disease, Heart failure ([address] Malignant hyperthermia due to anesthesia, Nausea & vomiting, Pseudocholinesterase deficiency, PUD (peptic ulcer disease), Seizures ([address] Sleep apnea, Thromboembolus ([address] or Thyroid disease. Ms. Kimi Delgado  has a past surgical history that includes hx cholecystectomy; hx hysterectomy; hx orthopaedic; hx orthopaedic (Right, 10/2014); neurological procedure unlisted (2000 & 2005); hx heart catheterization (2011); hx heart catheterization (12/29/2016); and hx heart catheterization (07/15/2019).   Social History/Living Environment:   Home Environment: Private residence  # Steps to Enter: 0  One/Two Story Residence: One story  Living Alone: No  Support Systems: Spouse/Significant Other/Partner, Child(paul), Family member(s)  Patient Expects to be Discharged to[de-identified] Private residence  Current DME Used/Available at Home: Wheelchair, [employer], straight, Grab bars, Other (comment)(only uses WC or SC when LB is painful or she is tired)  Tub or Shower Type: Tub/Shower combination(sits in bath due to vertigo)  Prior Level of Function/Work/Activity:  Pt disabled, driving, was independent with all self care tasks, has SC and WC but only used when her LB was hurting or she was tried, reports rare usage. Pt shared cooking with dtr. Magdir does all laundry. Has T/S combo with GBs, but prefers tub batheing due to history of vertigo and dizziness. Has had no trouble getting into or out of tub. Reports \"a few almost falls\" where she caught herself usually on uneven surfaces, but no brigida falls. Dominant Side:         RIGHT   Number of Personal Factors/Comorbidities that affect the Plan of Care: Extensive review of physical, cognitive, and psychosocial performance (3+):  HIGH COMPLEXITY   ASSESSMENT OF OCCUPATIONAL PERFORMANCE[de-identified]   Activities of Daily Living:   Basic ADLs (From Assessment) Complex ADLs (From Assessment)   Feeding: Independent  Oral Facial Hygiene/Grooming: Independent  Bathing: Independent  Upper Body Dressing: Independent  Lower Body Dressing: Independent  Toileting: Independent Instrumental ADL  Meal Preparation: Minimum assistance  Homemaking: Moderate assistance(does small batches, no heavy lifting)  Medication Management: Independent  Financial Management: Independent   Grooming/Bathing/Dressing Activities of Daily Living   Grooming  Grooming Assistance: Independent  Position Performed: Standing;Seated in chair(at sink level)  Washing Face: Independent  Washing Hands: Independent  Brushing Teeth:  Independent  Brushing/Combing Hair: Independent(shower cap used)  Cues: Mirror for visual feedback;Verbal cues provided;Visual cues provided Cognitive Retraining  Problem Solving: General alternative solution; Identifying the problem; Identifying the task  Executive Functions: Executing cognitive plans;Managing time;Regulating behavior  Organizing/Sequencing: Breaking task down;Prioritizing; Two step sequence  Attention to Task: Single task  Maintains Attention For (Time): Greater than 10 minutes  Following Commands: Follows two step commands/directions  Safety/Judgement: Awareness of environment; Fall prevention;Good awareness of safety precautions  Cues: Tactile cues provided   Upper Body Bathing  Bathing Assistance: Independent  Position Performed: Seated in chair;Standing(at sink level)  Cues: Verbal cues provided;Visual cues provided  Adaptive Equipment: (none) Feeding  Feeding Assistance: Independent  Container Management: Independent  Cutting Food: Independent  Utensil Management: Independent  Food to Mouth: Independent  Drink to Mouth: Independent   Lower Body Bathing  Bathing Assistance: Independent  Perineal  : Independent  Position Performed: Standing(in front of sink holding to sink)  Cues: Verbal cues provided(to increase safety)  Adaptive Equipment: Wipes(personal cleansing cloth); Other(comment)(no DME)  Lower Body : Independent; Modified independent; Compensatory technique training  Position Performed: Seated in chair;Bending forward method;Standing(at sink level)  Cues: Verbal cues provided;Visual cues provided  Adaptive Equipment: Other (comment)(Seated in chair for activity tolerance and safety) Toileting  Toileting Assistance: Independent  Bladder Hygiene: Independent  Clothing Management: Independent  Cues: Verbal cues provided;Visual cues provided  Adaptive Equipment: Elevated seat   Upper Body Dressing Assistance  Dressing Assistance: [id number]: Independent(including tying herself) Functional Transfers  Bathroom Mobility: Independent  Toilet Transfer : Independent(no rails used)  Adaptive Equipment: Other (comment)(none)   Lower Body Dressing Assistance  Dressing Assistance: Independent  Underpants: Independent  Socks: Independent  Leg Crossed Method Used: Yes  Position Performed: Bending forward method;Seated in chair;Standing  Cues: Verbal cues provided;Visual cues provided  Adaptive Equipment Used: Other(comment)(none) Bed/Mat Mobility  Rolling: Independent  Supine to Sit: Independent  Sit to Supine: Independent  Sit to Stand: Independent  Stand to Sit: Independent  Bed to Chair: Independent  Scooting: Independent     Most Recent Physical Functioning:   Gross Assessment:  AROM: Within functional limits  Strength: Generally decreased, functional(4/5 R sh flex and grasp, rest is 4+-5/5)  Coordination: Generally decreased, functional(slower with R UE, but pt reports much improved\)  Tone: Normal  Sensation: Impaired(R with minimal decreased sensation/numbness)               Posture:     Balance:  Sitting: Intact  Standing: Intact Bed Mobility:  Rolling: Independent  Supine to Sit: Independent  Sit to Supine: Independent  Scooting: Independent  Wheelchair Mobility:     Transfers:  Sit to Stand: Independent  Stand to Sit: Independent  Bed to Chair: Independent            Patient Vitals for the past 6 hrs:   BP BP Patient Position SpO2 O2 Flow Rate (L/min) Pulse   03/21/20 0800 94/55 At rest 92 %  85   03/21/20 0815   97 % 2 l/min        Mental Status  Neurologic State: Alert  Orientation Level: Oriented X4  Cognition: Follows commands, Appropriate for age attention/concentration  Perception: Appears intact  Perseveration: No perseveration noted  Safety/Judgement: Awareness of environment, Fall prevention, Good awareness of safety precautions                          Physical Skills Involved:  1. Sensation  2. Fine Motor Control Cognitive Skills Affected (resulting in the inability to perform in a timely and safe manner): 1. none  Psychosocial Skills Affected:  1. Habits/Routines  2. Environmental Adaptation  3.  Self-Awareness   Number of elements that affect the Plan of Care: 3-5: MODERATE COMPLEXITY   CLINICAL DECISION MAKIN[address] [place] [address] AM-PAC 6 Clicks   Daily Activity Inpatient Short Form  How much help from another person does the patient currently need. .. Total A Lot A Little None   1. Putting on and taking off regular lower body clothing? [] 1   [] 2   [] 3   [x] 4   2. Bathing (including washing, rinsing, drying)? [] 1   [] 2   [] 3   [x] 4   3. Toileting, which includes using toilet, bedpan or urinal?   [] 1   [] 2   [] 3   [x] 4   4. Putting on and taking off regular upper body clothing? [] 1   [] 2   [] 3   [x] 4   5. Taking care of personal grooming such as brushing teeth? [] 1   [] 2   [] 3   [x] 4   6. Eating meals? [] 1   [] 2   [] 3   [x] 4   © , Trustees of [address] [place] [address], under license to [employer]. All rights reserved      Score:  Initial: 24, completed 3/21/2020 Most Recent: X (Date: -- )    Interpretation of Tool:  Represents activities that are increasingly more difficult (i.e. Bed mobility, Transfers, Gait). Medical Necessity:     · Patient demonstrates   · excellent  ·  rehab potential due to higher previous functional level. Reason for Services/Other Comments:  · At baseline, proven with ADL this amYELITZA skilled OT. Use of outcome tool(s) and clinical judgement create a POC that gives a: LOW COMPLEXITY         TREATMENT:   (In addition to Assessment/Re-Assessment sessions the following treatments were rendered)     Pre-treatment Symptoms/Complaints:  \"I feel so much better than I have in a long time. \"  Pain: Initial:   Pain Intensity 1: 0  Post Session:  no complaint of pain     Self Care: (40 mins): Procedure(s) (per grid) utilized to improve and/or restore self-care/home management as related to dressing, bathing, toileting, grooming, self feeding, and safety with all ADLs and home management . Required minimal verbal and   cueing to facilitate activities of daily living skills and compensatory activities.     Evaluation: 8 mins    Braces/Orthotics/Lines/Etc:   · O2 Device: Nasal cannula  Treatment/Session Assessment:    · Response to Treatment:  tolerated well, ADL safely, Ind in room with PT clearance  · Interdisciplinary Collaboration:   o Physical Therapist  o Occupational Therapist  o Registered Nurse  o   o Certified Nursing Assistant/Patient Care Technician  · After treatment position/precautions:   o Up in chair  o Bed alarm/tab alert on  o Bed/Chair-wheels locked  o Bed in low position  o Call light within reach  o RN notified  o Tray table beside pt   · Compliance with Program/Exercises: Compliant most of the time.   · Recommendations/Intent for next treatment session: Eval, ADL, DC, skilled OT not indicated  Total Treatment Duration:  48 mins  OT Patient Time In/Time Out  Time In: 0916  Time Out: [address] Golden HillsKelsey Pablo, OT   Jayla Saldaña MS, OTR/L

## 2020-03-21 NOTE — PROGRESS NOTES
CM met with patient to discuss d/c plan. Patient states she wasn't interested in rehab nor HH. Patient states she will be returning back home. States she has great family support. CM will continue to monitor. Care Management Interventions  PCP Verified by CM: Yes(Rod Martin MD)  Mode of Transport at Discharge:  Other (see comment)(family)  Transition of Care Consult (CM Consult): Discharge Planning  Discharge Durable Medical Equipment: No  Physical Therapy Consult: Yes  Occupational Therapy Consult: Yes  Speech Therapy Consult: Yes  Current Support Network: Lives with Spouse, Own Home  Confirm Follow Up Transport: Family  [employer] Information Provided?: ([employer]/MCR part A only)  Discharge Location  Discharge Placement: Home

## 2020-03-21 NOTE — PROGRESS NOTES
Date of Outreach Update:  Karla Allen was seen and assessed. MEWS Score: 2 (03/21/20 0000)  Vitals:    03/20/20 1818 03/20/20 2000 03/20/20 2142 03/21/20 0000   BP: 117/60 122/71  130/80   Pulse: 94 78  76   Resp: 24 24  24   Temp: 98.1 °F (36.7 °C) 97.7 °F (36.5 °C)  97.9 °F (36.6 °C)   SpO2: 96% 97% 96% 96%   Weight:       Height:             Pain Assessment  Pain Intensity 1: 0 (03/20/20 2000)        Patient Stated Pain Goal: 0      Pt asleep in bed. Respirations even and unlabored. Primary RN without any additional needs/concerns at this time. Previous Outreach assessment has been reviewed. There have been no significant clinical changes since the completion of the last dated Outreach assessment. Will continue to follow up per outreach protocol.     Signed By:   Arnel Sadler    March 21, 2020 4:36 AM

## 2020-03-21 NOTE — PROGRESS NOTES
DIAN Gallegos aware patient needs Creat/GFR from within 24 hours. CT to call back once labs are updated.

## 2020-03-21 NOTE — PROGRESS NOTES
03/21/20 1854   NIH Stroke Scale   Interval Other (comment)   LOC 0   LOC Questions 0   LOC Commands 0   Best Gaze 0   Visual 0   Facial Palsy 0   Motor Right Arm 0   Motor Left Arm 0   Motor Right Leg 1   Motor Left Leg 0   Limb Ataxia 0   Sensory 0   Best Language 0   Dysarthria 0   Extinction and Inattention 0   Total 1

## 2020-03-21 NOTE — PROGRESS NOTES
Neurology Daily Progress Note     Assessment:     Left MCA stroke s/p tPA. CTA demonstrated occlusion of the M2 branch of the left MCA, pulmonary nodules. TTE negative for PFO or atrial enlargement. Will obtain CT of chest/abdomen/pelvis to evaluate for possible malignancy. If there are no suspicious lesions, then we will load the patient with Plavix 300 mg and continue 75 mg daily for a total of 90 days. Plan:     · Start ASA 81 mg daily   · Continue high intensity statin   · Neurochecks Q4H  · CT of chest/abdomen/pelvis to evaluate for possible malignancy. Pulmonary nodules noted on CTA. If there are no suspicious lesions, then we will load the patient with Plavix 300 mg and continue 75 mg daily for a total of 90 days. · Telemetry   · PT/OT/ST  · DVT prophylaxis   · BP management - normotensive, with long-term goal <130/80  · Smoking cessation if applicable   · Diabetes education if applicable   · Depression Screening prior to discharge      Subjective: Interval history:    Patient doing well. Continues to have mild right sided facial droop, RUE weakness improving. TTE negative for PFO or atrial enlargement. CTA of chest/abd/pelvis pending. History:    Francine Mazariegos is a 61 y.o. female who is being seen for stroke. Review of systems negative with exception of pertinent positives and negatives noted above.        Objective:     Vitals:    03/21/20 0000 03/21/20 0400 03/21/20 0800 03/21/20 0815   BP: 130/80 111/64 94/55    Pulse: 76 78 85    Resp: 24 24 22    Temp: 97.9 °F (36.6 °C) 97.6 °F (36.4 °C) 97.6 °F (36.4 °C)    SpO2: 96% 95% 92% 97%   Weight:       Height:              Current Facility-Administered Medications:     sodium chloride 0.9 % bolus infusion 100 mL, 100 mL, IntraVENous, RAD ONCE, Evelyn Chan MD    saline peripheral flush soln 10 mL, 10 mL, InterCATHeter, RAD ONCE, Karissa Shah MD    iopamidoL (ISOVUE-370) 76 % injection 100 mL, 100 mL, IntraVENous, RAD ONCE, Rocio Bard Tung MD    sodium chloride 0.9 % bolus infusion 100 mL, 100 mL, IntraVENous, RAD ONCE, Gautam Cordova MD    diatrizoate eliel-diatrizoat sod (MD-GASTROVIEW,GASTROGRAFIN) 66-10 % contrast solution 15 mL, 15 mL, Oral, RAD ONCE, Gautam Cordova MD    iopamidoL (ISOVUE-370) 76 % injection 100 mL, 100 mL, IntraVENous, RAD ONCE, Gautam Cordova MD    saline peripheral flush soln 10 mL, 10 mL, InterCATHeter, RAD ONCE, Gautam Cordova MD    budesonide (PULMICORT) 500 mcg/2 ml nebulizer suspension, 500 mcg, Nebulization, BID RT, Elton Jewell MD, 500 mcg at 03/21/20 0815    benzonatate (TESSALON) capsule 100 mg, 100 mg, Oral, TID PRN, Elton Jewell MD, 100 mg at 03/21/20 0800    citalopram (CELEXA) tablet 40 mg, 40 mg, Oral, DAILY, Elton Jewell MD, 40 mg at 03/21/20 0753    dilTIAZem CD (CARDIZEM CD) capsule 240 mg, 240 mg, Oral, DAILY, Elton Jewell MD, 240 mg at 03/21/20 0800    isosorbide mononitrate ER (IMDUR) tablet 120 mg, 120 mg, Oral, DAILY, Elton Jewell MD, 120 mg at 03/21/20 0804    LORazepam (ATIVAN) tablet 0.5 mg, 0.5 mg, Oral, BID, Elton Jewell MD, 0.5 mg at 03/21/20 0800    meclizine (ANTIVERT) tablet 25 mg, 25 mg, Oral, TID PRN, Elton Jewell MD    nitroglycerin (NITROSTAT) tablet 0.4 mg, 0.4 mg, SubLINGual, Q5MIN PRN, Elton Jewell MD    pravastatin (PRAVACHOL) tablet 20 mg, 20 mg, Oral, QHS, Elton Jewell MD, 20 mg at 03/20/20 2128    sodium chloride (NS) flush 5-40 mL, 5-40 mL, IntraVENous, Q8H, Elton Jewell MD, 5 mL at 03/21/20 0542    sodium chloride (NS) flush 5-40 mL, 5-40 mL, IntraVENous, PRN, Elton Jewell MD    ondansetron [facility]) injection 4 mg, 4 mg, IntraVENous, Q6H PRN, Elton Jewell MD    insulin lispro (HUMALOG) injection, , SubCUTAneous, AC&HS, Elton Jewell MD, Stopped at 03/20/20 1614    Recent Results (from the past 12 hour(s))   GLUCOSE, POC    Collection Time: 03/20/20  9:27 PM   Result Value Ref Range    Glucose (POC) 96 65 - 100 mg/dL   CREATININE AND GFR    Collection Time: 03/21/20  5:11 AM   Result Value Ref Range    Creatinine 0.64 0.6 - 1.0 MG/DL    GFR est AA >60 >60 ml/min/1.73m2    GFR est non-AA >60 >60 ml/min/1.73m2   GLUCOSE, POC    Collection Time: 03/21/20  7:13 AM   Result Value Ref Range    Glucose (POC) 127 (H) 65 - 100 mg/dL     CT Results (most recent):  Results from [place] encounter on 03/19/20   CT HEAD WO CONT    Narrative Examination: CT scan of the brain without contrast.    History: CVA s/p TPA, 61 years Female CVA s/p TPA  Scan to be done at 1pm per order    Technique: 5 mm axial imaging of the brain from the posterior fossa to the  vertex. All CT scans at this location are performed using dose modulation  techniques as appropriate to a performed exam including the following: automated  exposure control; adjustment of the mA and/or kV according to patient's size  (this includes techniques or standardized protocols for targeted exams where  dose is matched to indication/reason for exam; i.e. extremities or head); use of  iterative reconstruction technique. Comparison:  CT brain yesterday    Findings: Probable mild microvascular disease unchanged. The ventricles, sulci  are age-appropriate. No intracranial hemorrhage or extra-axial collection is  identified. No evidence of acute infarct. No mass effect or midline shift is  present. Basal cisterns are intact. The visualized paranasal sinuses and  mastoid air cells are clear. The orbits, bones, and soft tissues are normal in  appearance. Impression Impression:  No acute intracranial abnormality seen on CT. Physical Exam:  General - Well developed, well nourished, in no apparent distress. Pleasant and conversent. HEENT - Normocephalic, atraumatic. Conjunctiva are clear. Neck - Supple without masses  Cardiovascular - Regular rate and rhythm. Normal S1, S2 without murmurs, rubs, or gallops. Lungs - Clear to auscultation.   Abdomen - Soft, nontender with normal bowel sounds. Extremities - Peripheral pulses intact. No edema and no rashes. Neurological examination - Comprehension, attention, memory and reasoning are intact. Language and speech are normal.   On cranial nerve examination, (II, III, IV, VI) pupils are equal, round, and reactive to light. Visual acuity is adequate. Visual fields are full to finger confrontation. Extraocular motility is normal. (V, VII) mild facial droop on right and sensation is intact to light touch. (VIII) Hearing is intact. (IX, X) Palate and uvula elevate symmetrically. Voice is normal. (XI) Shoulder shrug is strong and equal bilaterally. (XII)Tongue is midline. Motor examination - There is normal muscle tone and bulk. Power is full throughout, mild drift in RUE. Muscle stretch reflexes are normoactive and there are no pathological reflexes present. Plantar response is flexor. Sensation is intact to light touch, pinprick, vibration and proprioception in all extremities. Cerebellar examination is normal. Gait and stance are deferred.        Signed By: Anita Braxton NP     March 21, 2020

## 2020-03-22 VITALS
TEMPERATURE: 98.5 F | DIASTOLIC BLOOD PRESSURE: 70 MMHG | HEART RATE: 81 BPM | OXYGEN SATURATION: 95 % | BODY MASS INDEX: 26.4 KG/M2 | HEIGHT: 66 IN | SYSTOLIC BLOOD PRESSURE: 109 MMHG | RESPIRATION RATE: 20 BRPM | WEIGHT: 164.24 LBS

## 2020-03-22 LAB — GLUCOSE BLD STRIP.AUTO-MCNC: 130 MG/DL (ref 65–100)

## 2020-03-22 PROCEDURE — 74011250637 HC RX REV CODE- 250/637: Performed by: INTERNAL MEDICINE

## 2020-03-22 PROCEDURE — 82962 GLUCOSE BLOOD TEST: CPT

## 2020-03-22 PROCEDURE — 74011250637 HC RX REV CODE- 250/637: Performed by: NURSE PRACTITIONER

## 2020-03-22 PROCEDURE — 74011250636 HC RX REV CODE- 250/636: Performed by: INTERNAL MEDICINE

## 2020-03-22 PROCEDURE — 94760 N-INVAS EAR/PLS OXIMETRY 1: CPT

## 2020-03-22 PROCEDURE — 94640 AIRWAY INHALATION TREATMENT: CPT

## 2020-03-22 PROCEDURE — 74011000250 HC RX REV CODE- 250: Performed by: INTERNAL MEDICINE

## 2020-03-22 RX ORDER — ATORVASTATIN CALCIUM 40 MG/1
40 TABLET, FILM COATED ORAL DAILY
Qty: 30 TAB | Refills: 3 | Status: SHIPPED | OUTPATIENT
Start: 2020-03-23 | End: 2020-07-29 | Stop reason: SDUPTHER

## 2020-03-22 RX ORDER — CLOPIDOGREL BISULFATE 75 MG/1
75 TABLET ORAL DAILY
Qty: 30 TAB | Refills: 2 | Status: SHIPPED | OUTPATIENT
Start: 2020-03-23 | End: 2020-04-27 | Stop reason: SDUPTHER

## 2020-03-22 RX ADMIN — HEPARIN SODIUM 5000 UNITS: 5000 INJECTION INTRAVENOUS; SUBCUTANEOUS at 02:23

## 2020-03-22 RX ADMIN — ISOSORBIDE MONONITRATE 120 MG: 30 TABLET, EXTENDED RELEASE ORAL at 08:20

## 2020-03-22 RX ADMIN — ATORVASTATIN CALCIUM 80 MG: 40 TABLET, FILM COATED ORAL at 08:20

## 2020-03-22 RX ADMIN — CITALOPRAM HYDROBROMIDE 40 MG: 20 TABLET ORAL at 08:20

## 2020-03-22 RX ADMIN — DILTIAZEM HYDROCHLORIDE 240 MG: 120 CAPSULE, COATED, EXTENDED RELEASE ORAL at 08:20

## 2020-03-22 RX ADMIN — BENZONATATE 100 MG: 100 CAPSULE ORAL at 08:20

## 2020-03-22 RX ADMIN — CLOPIDOGREL BISULFATE 75 MG: 75 TABLET ORAL at 08:20

## 2020-03-22 RX ADMIN — ASPIRIN 81 MG 81 MG: 81 TABLET ORAL at 08:20

## 2020-03-22 RX ADMIN — BUDESONIDE 500 MCG: 0.5 INHALANT RESPIRATORY (INHALATION) at 07:22

## 2020-03-22 RX ADMIN — LORAZEPAM 0.5 MG: 0.5 TABLET ORAL at 08:20

## 2020-03-22 RX ADMIN — Medication 10 ML: at 06:08

## 2020-03-22 NOTE — PROGRESS NOTES
Pt is for discharge home today with no needs/supportive care orders recieved for CM at this time. Care Management Interventions  PCP Verified by CM: Yes(Barbara Martin MD)  Mode of Transport at Discharge:  Other (see comment)(family)  Transition of Care Consult (CM Consult): Discharge Planning  Discharge Durable Medical Equipment: No  Physical Therapy Consult: Yes  Occupational Therapy Consult: Yes  Speech Therapy Consult: Yes  Current Support Network: Lives with Spouse, Own Home  Confirm Follow Up Transport: Family  [employer] Information Provided?: ([employer]/M[employer] part A only)  Discharge Location  Discharge Placement: Home

## 2020-03-22 NOTE — PROGRESS NOTES
03/22/20 0744   NIH Stroke Scale   Interval Other (comment)  (shift report with Al)   LOC 0   LOC Questions 0   LOC Commands 0   Best Gaze 0   Visual 0   Facial Palsy 0   Motor Right Arm 0   Motor Left Arm 0   Motor Right Leg 1   Motor Left Leg 0   Limb Ataxia 0   Sensory 0   Best Language 0   Dysarthria 0   Extinction and Inattention 0   Total 1

## 2020-03-22 NOTE — PROGRESS NOTES
Problem: Falls - Risk of  Goal: *Absence of Falls  Description: Document Alejandro Bunting Fall Risk and appropriate interventions in the flowsheet.   Outcome: Progressing Towards Goal  Note: Fall Risk Interventions:            Medication Interventions: Bed/chair exit alarm    Elimination Interventions: Bed/chair exit alarm, Call light in reach              Problem: Patient Education: Go to Patient Education Activity  Goal: Patient/Family Education  Outcome: Progressing Towards Goal     Problem: Patient Education: Go to Patient Education Activity  Goal: Patient/Family Education  Outcome: Progressing Towards Goal     Problem: TIA/CVA Stroke: 0-24 hours  Goal: Off Pathway (Use only if patient is Off Pathway)  Outcome: Progressing Towards Goal  Goal: Activity/Safety  Outcome: Progressing Towards Goal  Goal: Consults, if ordered  Outcome: Progressing Towards Goal  Goal: Diagnostic Test/Procedures  Outcome: Progressing Towards Goal  Goal: Nutrition/Diet  Outcome: Progressing Towards Goal  Goal: Discharge Planning  Outcome: Progressing Towards Goal  Goal: Medications  Outcome: Progressing Towards Goal  Goal: Respiratory  Outcome: Progressing Towards Goal  Goal: Treatments/Interventions/Procedures  Outcome: Progressing Towards Goal  Goal: Minimize risk of bleeding post-thrombolytic infusion  Outcome: Progressing Towards Goal  Goal: Monitor for complications post-thrombolytic infusion  Outcome: Progressing Towards Goal  Goal: Psychosocial  Outcome: Progressing Towards Goal  Goal: *Hemodynamically stable  Outcome: Progressing Towards Goal  Goal: *Neurologically stable  Description: Absence of additional neurological deficits    Outcome: Progressing Towards Goal  Goal: *Verbalizes anxiety and depression are reduced or absent  Outcome: Progressing Towards Goal  Goal: *Absence of Signs of Aspiration on Current Diet  Outcome: Progressing Towards Goal  Goal: *Absence of deep venous thrombosis signs and symptoms(Stroke Metric)  Outcome: Progressing Towards Goal  Goal: *Ability to perform ADLs and demonstrates progressive mobility and function  Outcome: Progressing Towards Goal  Goal: *Stroke education started(Stroke Metric)  Outcome: Progressing Towards Goal  Goal: *Dysphagia screen performed(Stroke Metric)  Outcome: Progressing Towards Goal  Goal: *Rehab consulted(Stroke Metric)  Outcome: Progressing Towards Goal     Problem: TIA/CVA Stroke: Day 2 Until Discharge  Goal: Off Pathway (Use only if patient is Off Pathway)  Outcome: Progressing Towards Goal  Goal: Activity/Safety  Outcome: Progressing Towards Goal  Goal: Diagnostic Test/Procedures  Outcome: Progressing Towards Goal  Goal: Nutrition/Diet  Outcome: Progressing Towards Goal  Goal: Discharge Planning  Outcome: Progressing Towards Goal  Goal: Medications  Outcome: Progressing Towards Goal  Goal: Respiratory  Outcome: Progressing Towards Goal  Goal: Treatments/Interventions/Procedures  Outcome: Progressing Towards Goal  Goal: Psychosocial  Outcome: Progressing Towards Goal  Goal: *Verbalizes anxiety and depression are reduced or absent  Outcome: Progressing Towards Goal  Goal: *Absence of aspiration  Outcome: Progressing Towards Goal  Goal: *Absence of deep venous thrombosis signs and symptoms(Stroke Metric)  Outcome: Progressing Towards Goal  Goal: *Optimal pain control at patient's stated goal  Outcome: Progressing Towards Goal  Goal: *Tolerating diet  Outcome: Progressing Towards Goal  Goal: *Ability to perform ADLs and demonstrates progressive mobility and function  Outcome: Progressing Towards Goal  Goal: *Stroke education continued(Stroke Metric)  Outcome: Progressing Towards Goal     Problem: Ischemic Stroke: Discharge Outcomes  Goal: *Verbalizes anxiety and depression are reduced or absent  Outcome: Progressing Towards Goal  Goal: *Verbalize understanding of risk factor modification(Stroke Metric)  Outcome: Progressing Towards Goal  Goal: *Hemodynamically stable  Outcome: Progressing Towards Goal  Goal: *Absence of aspiration pneumonia  Outcome: Progressing Towards Goal  Goal: *Aware of needed dietary changes  Outcome: Progressing Towards Goal  Goal: *Verbalize understanding of prescribed medications including anti-coagulants, anti-lipid, and/or anti-platelets(Stroke Metric)  Outcome: Progressing Towards Goal  Goal: *Tolerating diet  Outcome: Progressing Towards Goal  Goal: *Aware of follow-up diagnostics related to anticoagulants  Outcome: Progressing Towards Goal  Goal: *Ability to perform ADLs and demonstrates progressive mobility and function  Outcome: Progressing Towards Goal  Goal: *Absence of DVT(Stroke Metric)  Outcome: Progressing Towards Goal  Goal: *Absence of aspiration  Outcome: Progressing Towards Goal  Goal: *Optimal pain control at patient's stated goal  Outcome: Progressing Towards Goal  Goal: *Home safety concerns addressed  Outcome: Progressing Towards Goal  Goal: *Describes available resources and support systems  Outcome: Progressing Towards Goal  Goal: *Verbalizes understanding of activation of EMS(911) for stroke symptoms(Stroke Metric)  Outcome: Progressing Towards Goal  Goal: *Understands and describes signs and symptoms to report to providers(Stroke Metric)  Outcome: Progressing Towards Goal  Goal: *Neurolgocially stable (absence of additional neurological deficits)  Outcome: Progressing Towards Goal  Goal: *Verbalizes importance of follow-up with primary care physician(Stroke Metric)  Outcome: Progressing Towards Goal  Goal: *Smoking cessation discussed,if applicable(Stroke Metric)  Outcome: Progressing Towards Goal  Goal: *Depression screening completed(Stroke Metric)  Outcome: Progressing Towards Goal     Problem: Patient Education: Go to Patient Education Activity  Goal: Patient/Family Education  Outcome: Progressing Towards Goal

## 2020-03-22 NOTE — DISCHARGE SUMMARY
Hospitalist Discharge Summary     Admit Date:  3/19/2020  3:03 PM   Name:  Santo Olsen   Age:  61 y.o.  :  1960   MRN:  [id number]   PCP:  Urvashi Fernandez MD  Treatment Team: Attending Provider: Vishnu Eaton MD; Nurse Practitioner: Antonio Levin NP; Consulting Provider: Candida Maria DO; Care Manager: Raynelle Boast, RN; Consulting Provider: Merlin Hill MD; Utilization Review: Jerod Garcia RN; Primary Nurse: Pranav Garcia RN; Charge Nurse: Rosanna Rivera    Problem List for this Hospitalization:  Hospital Problems as of 3/22/2020 Date Reviewed: 2/3/2020          Codes Class Noted - Resolved POA    Chronic respiratory failure with hypoxia ([facility].) (Chronic) ICD-10-CM: J96.11  ICD-9-CM: 518.83, 799.02  3/21/2020 - Present Yes        * (Principal) CVA (cerebral vascular accident) [facility]) ICD-10-CM: I63.9  ICD-9-CM: 434.91  3/19/2020 - Present Yes        Dependence on continuous supplemental oxygen (Chronic) ICD-10-CM: Z99.81  ICD-9-CM: V46.2  2018 - Present Yes        Essential hypertension (Chronic) ICD-10-CM: I10  ICD-9-CM: 401.9  Unknown - Present Yes        Diabetes ([facility] (Chronic) ICD-10-CM: E11.9  ICD-9-CM: 250.00  Unknown - Present Yes        COPD (chronic obstructive pulmonary disease) ([facility] (Chronic) ICD-10-CM: J44.9  ICD-9-CM: 264  2017 - Present Yes        Cigarette nicotine dependence with nicotine-induced disorder (Chronic) ICD-10-CM: F17.219  ICD-9-CM: 292.9  2016 - Present Yes        ASCVD (arteriosclerotic cardiovascular disease) (Chronic) ICD-10-CM: I25.10  ICD-9-CM: 429.2, 440.9  2016 - Present Yes                Admission HPI from 3/19/2020:    \" Patient is a 49-year-old female with a past medical history of COPD (supposed be on 2 L oxygen at home), fibromyalgia, tobacco abuse, hyperlipidemia, IBS, HTN, diabetes mellitus, CAD, and several back surgeries who presents to the emergency department with right facial droop and dysarthria. Patient reportedly started having symptoms between 1030 and 11 AM.  Patient reports her daughter noticed a right facial droop. This progressed to right arm weakness and right leg weakness. Patient still had symptoms on arrival to emergency department. There was delay and evaluation by tele-neurology. Stat head CT was negative for acute pathology. Stat head CTA showed High-grade stenosis versus occlusion involving the anterior M2 branch of the left MCA. Neuro interventionalists did not recommend intervention. Patient was recommended as TPA candidate. Patient was seen and examined during TPA infusion. Patient at that time reported improvement in facial droop and dysarthria. She denied any chest pain, palpitations, or shortness of breath. She denied any headache, dizziness, lightheadedness, changes in vision, or hearing difficulty. Patient examined again 10 to 15 minutes later with worsening of symptoms. Discussed with neurosurgery who recommended transfer to [address] [place] [address] ED after repeat CT to evaluate for bleed. Patient was seen and examined at [place] ED. \"    Hospital Course:  Pt is a 62 y/o F with COPD on 2L NC, DM, HTN, smoker, who was admitted with CVA. presented to ER at [facility] with R facial droop and weakness.  Found to have high grade stenosis/occlusion on CTA in anterior M2 branch L MCA.  Given TPA with some improvement in symptoms and transferred downtown for ICU monitoring and neuro follow-up. Her symptoms are really just limited to mild R hand weakness at this point. Neurology checked panCT to rule out malignancy; none found. Full report below. Cont ASA 81mg. plavix for 90d.   High intensity statin indicated regardless of LDL    Disposition: Home or Self Care  Activity: Activity as tolerated  Diet: DIET DIABETIC WITH OPTIONS Consistent Carb 1500-1600kcal; Regular  Code Status: Full Code    Follow Up Orders:  No orders of the defined types were placed in this encounter. Follow-up Information     Follow up With Specialties Details Why Contact Garret Stanley MD Internal Medicine Call on 3/23/2020 follow up Clifford Lopez 2  [phone]            Discharge meds at bottom of this note. Plan was discussed with pt. All questions answered. Patient was stable at time of discharge. Patient will call a physician or return if any concerns. Discharge summary and encounter summary was sent to PCP electronically via \"Comm Mgt\" link in [facility], if possible. Diagnostic Imaging/Tests:   Mri Brain Wo Cont    Result Date: 3/19/2020  Examination: MRI brain without gadolinium History:  CVA, 61 years Female Comparison:CT brain March 19, 2020 earlier today Technique: Sagittal T1-weighted, axial FLAIR, axial fast spin-echo T2-weighted, axial T1-weighted, axial gradient echo and coronal fast inversion recovery images of the brain were performed. The study was performed on a 1.5  So MRI unit. There were no contraindications to MRI based on the screening form. Findings: Punctate foci of diffusion restriction are seen in the left posterior frontal lobe cortex in the precentral and post central gyrus, which may represent tiny foci of acute infarction. There is no evidence of masses or mass effect, obvious hemorrhage. The ventricles and sulci are appropriate for age. The brainstem and cerebellum are unremarkable. Paranasal sinuses are clear. Globes and other extracranial soft tissues are unremarkable. Impression: Punctate foci of diffusion restriction in the cortex of the left precentral and postcentral gyri, may represent tiny acute infarctions, without significant mass effect.     Ct Head Wo Cont    Result Date: 3/20/2020  Examination: CT scan of the brain without contrast. History: CVA s/p TPA, 61 years Female CVA s/p TPA Scan to be done at 1pm per order Technique: 5 mm axial imaging of the brain from the posterior fossa to the vertex. All CT scans at this location are performed using dose modulation techniques as appropriate to a performed exam including the following: automated exposure control; adjustment of the mA and/or kV according to patient's size (this includes techniques or standardized protocols for targeted exams where dose is matched to indication/reason for exam; i.e. extremities or head); use of iterative reconstruction technique. Comparison:  CT brain yesterday Findings: Probable mild microvascular disease unchanged. The ventricles, sulci are age-appropriate. No intracranial hemorrhage or extra-axial collection is identified. No evidence of acute infarct. No mass effect or midline shift is present. Basal cisterns are intact. The visualized paranasal sinuses and mastoid air cells are clear. The orbits, bones, and soft tissues are normal in appearance. Impression:  No acute intracranial abnormality seen on CT. Ct Head Wo Cont    Result Date: 3/19/2020  Head CT INDICATION: Left-sided weakness, increasing symptoms post TPA administration Multiple axial images obtained through the brain without intravenous contrast. Radiation dose reduction techniques were used for this study: All CT scans performed at this facility use one or all of the following: Automated exposure control, adjustment of the mA and/or kVp according to patient's size, iterative reconstruction. Compared with earlier study. FINDINGS: No areas of abnormal attenuation are seen in the brain. There is no CT evidence of acute hemorrhage or infarction. The ventricles are normal in size. There are no extra-axial fluid collections. No masses are seen. The sinuses are clear. There are no bony lesions. IMPRESSION: No CT evidence of acute intracranial abnormality. Ct Chest Abd Pelv W Cont    Result Date: 3/21/2020  CT of the chest, abdomen, and pelvis with contrast 3/21/2020 Comparison: Chest CT 9/30/2019 and prior.  CT neck 3/19/2020 Indication: Pulmonary nodules, evaluate for malignancy. Technique:  CT imaging was performed of the chest, abdomen, and pelvis following the uncomplicated administration of intravenous contrast (Isovue 370, 100 mL). Intravenous contrast was used for better evaluation of solid organs and vascular structures. Oral contrast was used for bowel opacification. Radiation dose reduction techniques were used for this study:  Our CT scanners use one or all of the following: Automated exposure control, adjustment of the mA and/or kVp according to patient's size, iterative reconstruction. Findings: CHEST CT: Heart size upper limits normal. There is subendocardial fat deposition at the left ventricular apex with LAD calcification suggesting sequelae remote infarction. No pathologically enlarged mediastinal, hilar, or axillary lymph nodes. No pleural or pericardial effusion. Moderate emphysema. Perihilar bronchial wall thickening with endobronchial debris extending to the right lower lobe without consolidation. This likely represents sequela aspiration. There are 3 vague groundglass nodules at the superior segment right lower lobe new from previous exam measuring up to 6 mm. ABDOMEN CT: The liver is normal in appearance, with no focal abnormalities. Gallbladder surgically absent. There are atherosclerotic calcifications noted within the aorta and its branches. The spleen, pancreas, adrenal glands, and kidneys are normal. There is no intra or extrahepatic biliary ductal dilatation. PELVIS CT: The bowel is normal in caliber, and there is no focal or diffuse bowel wall thickening. There is no free air or free fluid in the abdomen or pelvis. There is no significant retroperitoneal, pelvic, mesenteric, or inguinal lymphadenopathy. The bladder is unremarkable. There are no aggressive appearing osseous lesions. IMPRESSION: 1. No discrete solid organ mass.  2. Few vague groundglass nodule superior segment right lower lobe with adjacent endobronchial debris and bronchial wall thickening. Constellation of findings suggest sequelae of aspiration and small foci aspiration pneumonitis. Recommend repeat noncontrast chest CT using nodule protocol in 3 months. 3. Coronary artery calcification with evidence suggesting prior LAD left ventricular apex and part. Ct Perf W Cbf    Result Date: 3/19/2020  EXAMINATION: CT PERFUSION DATE: 3/19/2020 3:26 PM INDICATION: : change neurological symptoms after TPA administration COMPARISON: Same date at 11:30 TECHNIQUE: CT perfusion of the brain was obtained after the administration of intravenous contrast. Perfusion maps and perfusion analysis output were generated using the [employer] perfusion processing software algorithm. Radiation dose reduction techniques were used for this study: All CT scans performed at this facility use one or all of the following: Automated exposure control, adjustment of the mA and/or kVp according to patient's size, iterative reconstruction. FINDINGS:    Again noted is a perfusion abnormality corresponding to the left MCA territory including an area of diminished cerebral blood flow now measuring 2 mL compared to 9 mL on the previous study and a surrounding area of elevated T max measuring 36 mL currently compared to 100 mL of the previous study. Perfusion Output Values: CBF < 30% volume (best correlation with core infarct volume without overcalls): 2 ml (core infarction volume greater than 50 cc associated with poor outcomes) Tmax > 6 seconds: 36 ml Tmax/CBF Mismatch Volume: 34 ml Tmax/CBF Mismatch Ratio: 18 Tmax > 10 seconds: 0 ml Hypoperfusion index: 0.0. IMPRESSION: Core infarct in the left MCA territory with surrounding ischemic penumbra. Volume of the core infarct and ischemic penumbra has decreased slightly compared to the perfusion study earlier on the same day.  Please note that the determination of patient treatment is not based solely upon imaging factors or calculation values. Management of ischemia is at the discretion of the primary physician and is based upon a combination of clinical and imaging data, along other factors. Ct Perf W Cbf    Result Date: 3/19/2020  EXAMINATION: CT PERFUSION DATE: 3/19/2020 11:39 AM INDICATION: : Code Neuro; no additional history provided COMPARISON: Head CT and CT angiogram from the same day TECHNIQUE: CT perfusion of the brain was obtained after the administration of intravenous contrast. Perfusion maps and perfusion analysis output were generated using the [employer] perfusion processing software algorithm. Radiation dose reduction techniques were used for this study: All CT scans performed at this facility use one or all of the following: Automated exposure control, adjustment of the mA and/or kVp according to patient's size, iterative reconstruction. FINDINGS:    There is an area of diminished cerebral blood flow in the left MCA territory measuring 9 mL. There is a corresponding larger ischemic penumbra measuring 100 mL. Perfusion Output Values: CBF < 30% volume (best correlation with core infarct volume without overcalls): 9 ml (core infarction volume greater than 50 cc associated with poor outcomes) Tmax > 6 seconds: 100 ml Tmax/CBF Mismatch Volume: 91 ml Tmax/CBF Mismatch Ratio: 11 Hypoperfusion Intensity Ratio (Tmax > 10 seconds / Tmax > 6 seconds): 0.4 (values greater than 0.5 associated with poor outcome) Tmax > 10 seconds Volume: 44 ml (volume greater than 100 mL is associated with poor outcome)     IMPRESSION:   Core infarct in the left MCA territory with larger surrounding ischemic penumbra. Please note that the determination of patient treatment is not based solely upon imaging factors or calculation values. Management of ischemia is at the discretion of the primary physician and is based upon a combination of clinical and imaging data, along other factors.        Cta Code Neuro Head And Neck W Cont    Result Date: 3/20/2020  Title:  CT arteriogram of the neck and head. Indication: Cerebrovascular accident (CVA). . Technique: Axial images of the neck and head were obtained after the uneventful administration of intravenous iodinated contrast media. Contrast was used to best identify the arterial structures. Images were reviewed on a separate, free standing, three-dimensional workstation as per the referring physicians request.  All stenosis percentages derived by comparing the narrowest segment with the distal Internal Carotid Artery luminal diameter, as described in the [employer] Symptomatic Carotid Endarterectomy Trial (NASCET) criteria. All CT scans at this facility are performed using dose reduction/dose modulation techniques, as appropriate the performed exam, including the following: Automated Exposure Control; Adjustment of the mA and/or kV according to patient size (this includes techniques or standardized protocols for targeted exams where dose is matched to indication/reason for exam); and Use of Iterative Reconstruction Technique. Comparison: CTA performed earlier in the day. Findings: Brain: No midline shift or mass effect. No enhancing mass lesion. Lungs:  Multiple small solid pulmonary nodules are again seen as detailed on the recent chest CT report. Background of moderate centrilobular emphysema. Secretions are present within the trachea and mainstem bronchi, consistent with aspiration. Bones:  No osseous destruction. . Moderate multilevel degenerative changes with facet acropathy in the cervical spine. Probable small exophytic osteoma along the left occiput. Degenerative disc disease with anterior osteophyte formation is present at C6/C7. Paranasal sinuses:  Paranasal sinuses are clear. .  Brain:  No acute intracranial abnormality. No mass lesion or hydrocephalus. .  Soft tissues:  A 1.3 cm nodule is present in the right thyroid lobe. .  Dural venous sinuses:  Patent.  Aortic arch:  Nonaneurysmal. Mild to moderate atherosclerosis. .  Right brachiocephalic artery:  No significant stenosis or occlusion. .  . Right subclavian artery:  No significant stenosis or occlusion. .  . Left subclavian artery:  No significant stenosis or occlusion. .  . Right common carotid artery:  No significant stenosis or occlusion. .  . Right external carotid artery:  No significant stenosis or occlusion. .  . Right internal carotid artery:  No significant stenosis or occlusion. .  . Mild to moderate atherosclerosis of the carotid bulb. Left common carotid artery: No significant stenosis or occlusion. .  . Left external carotid artery:  No significant stenosis or occlusion. .  . Left internal carotid artery:  No significant stenosis or occlusion. . Mild to moderate atherosclerosis of the carotid bulb. Right vertebral artery:  No significant stenosis or occlusion. .. Left vertebral artery:  No significant stenosis or occlusion. . Basilar artery:  No significant stenosis, occlusion, or aneurysm. .  Right middle cerebral artery:  No significant stenosis, occlusion, or aneurysm. Right anterior cerebral artery:  No significant stenosis, occlusion, or aneurysm. Ethelene Gauss Anterior communicating artery: No significant stenosis, occlusion, or aneurysm. Ethelene Gauss Left middle cerebral artery:  No significant stenosis, occlusion, or aneurysm. . High-grade stenosis versus occlusion involving the anterior M2 branch at the left MCA bifurcation. Similar to the prior exam. Distal branches do opacify with contrast distally. Left anterior cerebral artery:  No significant stenosis, occlusion, or aneurysm. .  Right posterior communicating artery: No significant stenosis, occlusion, or aneurysm. Ethelene Gauss Left posterior communicating artery:  No significant stenosis, occlusion, or aneurysm. .  Right posterior cerebral artery:  No significant stenosis, occlusion, or aneurysm. Ethelene Gauss Left posterior cerebral artery:  No significant stenosis, occlusion, or aneurysm. .      Impression: 1.   High-grade stenosis versus occlusion involving the anterior M2 branch at the left MCA bifurcation. Similar to the prior exam. 2.  Multiple small pulmonary nodules, new compared to the prior CT 9/30/2019. Background of emphysema. Chest CT is recommended to further evaluate for primary lung malignancy and metastatic disease. 3.  Secretions are present within the trachea and mainstem bronchi, consistent with aspiration. Cta Code Neuro Head And Neck W Cont    Result Date: 3/19/2020  Title:  CT arteriogram of the neck and head. Indication: Strokelike symptoms. . Technique: Axial images of the neck and head were obtained after the uneventful administration of intravenous iodinated contrast media. Contrast was used to best identify the arterial structures. Images were reviewed on a separate, free standing, three-dimensional workstation as per the referring physicians request.  All stenosis percentages derived by comparing the narrowest segment with the distal Internal Carotid Artery luminal diameter, as described in the [employer] Symptomatic Carotid Endarterectomy Trial (NASCET) criteria. All CT scans at this facility are performed using dose reduction/dose modulation techniques, as appropriate the performed exam, including the following: Automated Exposure Control; Adjustment of the mA and/or kV according to patient size (this includes techniques or standardized protocols for targeted exams where dose is matched to indication/reason for exam); and Use of Iterative Reconstruction Technique. Comparison: CT 9/30/2019. Yennifer Raymond Findings: Brain: No enhancing mass lesion. No midline shift or mass effect. Lungs:  Emphysematous changes are present. Multiple small solid pulmonary nodules, new compared to the prior CT 9/30/2019. The largest of these measures 7 mm and is present in the superior aspect of the right lower lobe. Bones:  No osseous destruction. . Facet arthropathy is present in the cervical spine.  Degenerative disc disease with anterior osteophyte formation is present at C6-C7. Paranasal sinuses:  Paranasal sinuses are clear. .  Brain:  No acute intracranial abnormality. No mass lesion or hydrocephalus. .  Soft tissues: There is a 1.3 cm nodule in the right thyroid lobe. .  Dural venous sinuses:  Patent. Aortic arch:  Mild calcific atherosclerosis. Nonaneurysmal..  Right brachiocephalic artery:  No significant stenosis or occlusion. .  . Right subclavian artery:  No significant stenosis or occlusion. .  . Left subclavian artery:  No significant stenosis or occlusion. .  . Right common carotid artery:  No significant stenosis or occlusion. .  . Right external carotid artery:  No significant stenosis or occlusion. .  . Right internal carotid artery:  No significant stenosis or occlusion. .  . Moderate atherosclerosis of the carotid bulb. Left common carotid artery: No significant stenosis or occlusion. .  . Left external carotid artery:  No significant stenosis or occlusion. .  . Left internal carotid artery:  No significant stenosis or occlusion. . Moderate atherosclerosis of the carotid bulb. Right vertebral artery:  No significant stenosis or occlusion. .. Left vertebral artery:  No significant stenosis or occlusion. . Basilar artery:  No significant stenosis, occlusion, or aneurysm. .  Right middle cerebral artery:  No significant stenosis, occlusion, or aneurysm. Right anterior cerebral artery:  No significant stenosis, occlusion, or aneurysm. [facility] Anterior communicating artery: No significant stenosis, occlusion, or aneurysm. [facility] Left middle cerebral artery:  Acute arterial occlusion involving a proximal M2 branch near the left MCA bifurcation. The M1 segment appears patent. Left anterior cerebral artery:  No significant stenosis, occlusion, or aneurysm. .  Right posterior communicating artery: No significant stenosis, occlusion, or aneurysm. [facility] Left posterior communicating artery:  No significant stenosis, occlusion, or aneurysm. .  Right posterior cerebral artery:  No significant stenosis, occlusion, or aneurysm. Neisha Morin Left posterior cerebral artery:  No significant stenosis, occlusion, or aneurysm. .      Impression: 1. Acute arterial occlusion involving a proximal M2 branch near the left MCA bifurcation. 2.  Multiple new small pulmonary nodules since the prior exam in 2019. The largest of these measures 7 mm. Background of emphysema. Recommend chest CT to further evaluate for primary lung malignancy or metastatic disease. Findings were called to patient by Dr. Martina Cole via telephone on 3/19/2020 at 1217 hours    Ct Code Neuro Head Wo Contrast    Result Date: 3/19/2020  HEAD CT WITHOUT CONTRAST  3/19/2020 HISTORY:   Code stroke;  left-sided deficit TECHNIQUE: Noncontrast axial images were obtained through the brain. All CT scans at this facility used dose modulation, interactive reconstruction and/or weight based dosing when appropriate to reduce radiation dose to as low as reasonably achievable. COMPARISON: None FINDINGS: There is no acute intracranial hemorrhage, significant mass effect or CT evidence of acute large artery territorial infarction. Please note that a hyperacute infarct or small vessel infarct may not be apparent on initial CT imaging. Mild cerebral volume loss is present. There is no hydrocephalus , intra-axial mass or abnormal extra-axial fluid collection. There are no displaced skull fractures. The mastoid air cells and paranasal sinuses are clear where imaged.      IMPRESSION: No acute findings       Echocardiogram results:  Results for orders placed or performed during the hospital encounter of 20   2D ECHO COMPLETE ADULT (TTE) W OR [address], [facility]  [phone]    Transthoracic Echocardiogram  2D, M-mode, Doppler, and Color Doppler    Patient: Madeline Dillon  MR #: [id number]  : 1960  Age: 61 years  Gender: Female  Study date: 20-Mar-2020  Account #: [de-identified]  Height: 66 in  Weight: 160.6 lb  BSA: 1.82 mï¾²  Status:Routine  Location: 3112  BP: 133/ 67    Allergies: LATEX, LATEX, NATURAL RUBBER, UMECLIDINIUM-VILANTEROL, EGG,  MOXIFLOXACIN, BANANA, MOXIFLOXACIN HCL, PSEUDOEPHEDRINE HCL, DIAZEPAM,  WATERMELON    Sonographer:  Ervin Shaikh [facility]  Group:  [facility]  Reading Physician:  Keri Jorgensen MD [facility]    INDICATIONS: CVA    PROCEDURE: This was a routine study. A transthoracic echocardiogram was  performed. The study included complete 2D imaging, M-mode, complete spectral  Doppler, and color Doppler. Intravenous contrast (Definity, 1 ml) was  administered to opacify the left ventricle. Image quality was adequate. LEFT VENTRICLE: Size was normal. Systolic function was normal. Ejection  fraction was estimated in the range of 55 % to 60 %. There were no regional  wall motion abnormalities. Wall thickness was normal.    RIGHT VENTRICLE: The size was normal. Systolic function was normal.    LEFT ATRIUM: Size was normal.    ATRIAL SEPTUM: Contrast injection was performed. There was no right-to-left  shunt, with provocative maneuvers to increase right atrial pressure. RIGHT ATRIUM: Size was normal.    SYSTEMIC VEINS: IVC: The inferior vena cava was normal in size and course. AORTIC VALVE: The valve was structurally normal, tri-commissural. There was   no  evidence for stenosis. There was no insufficiency. MITRAL VALVE: Valve structure was normal. There was no evidence for stenosis. There was no regurgitation. TRICUSPID VALVE: The valve structure was normal. There was no evidence for  stenosis. There was no regurgitation. PULMONIC VALVE: The valve structure was normal. There was no evidence for  stenosis. There was no insufficiency. PERICARDIUM: There was no pericardial effusion. AORTA: The root exhibited normal size.     SUMMARY:    -  Left ventricle: Systolic function was normal. Ejection fraction was  estimated in the range of 55 % to 60 %. There were no regional wall motion  abnormalities. -  Atrial septum: Contrast injection was performed. There was no   right-to-left  shunt, with provocative maneuvers to increase right atrial pressure.     SYSTEM MEASUREMENT TABLES    2D  Ao Diam: 3 cm  LA Diam: 3.2 cm  %FS: 34.7 %  IVSd: 1 cm  LVIDd: 4.6 cm  LVIDs: 3 cm  LVOT Diam: 2.1 cm  LVPWd: 1 cm  RVIDd: 2.5 cm    Prepared and signed by    Steven Milner [place] [facility] [place]  Signed 20-Mar-2020 12:42:01         Procedures done this admission:  * No surgery found *    All Micro Results     None          Labs: Results:       BMP, Mg, Phos Recent Labs     03/21/20  0511 03/20/20  0326 03/19/20  1135   NA  --  140 131*   K  --  3.8 3.5   CL  --  106 100   CO2  --  25 25   AGAP  --  9 6*   BUN  --  6* 10   CREA 0.64 0.68 0.90   CA  --  8.4 7.9*   GLU  --  104* 296*      CBC Recent Labs     03/19/20  1135   WBC 7.6   RBC 4.66   HGB 13.9   HCT 42.8      GRANS 71   LYMPH 20   EOS 2   MONOS 6   BASOS 1   IG 0   ANEU 5.4   ABL 1.5   JEVON 0.1   ABM 0.5   ABB 0.1   AIG 0.0      LFT Recent Labs     03/19/20  1135   SGOT 13*   ALT 15   AP 69   TP 6.2*   ALB 2.8*   GLOB 3.4   AGRAT 0.8*      Cardiac Testing Lab Results   Component Value Date/Time     12/07/2013 03:43 AM    Troponin-I <0.05 06/04/2011 01:37 PM    Troponin-I, Qt. <0.02 (L) 03/19/2020 11:35 AM    Troponin-I, Qt. <0.02 (L) 06/08/2017 12:40 PM    Troponin-I, Qt. <0.04 12/05/2013 07:15 PM      Coagulation Tests Lab Results   Component Value Date/Time    Prothrombin time 14.8 (H) 03/19/2020 11:35 AM    Prothrombin time 13.5 07/15/2019 07:53 AM    Prothrombin time 10.7 12/21/2016 01:24 PM    INR 1.1 03/19/2020 11:35 AM    INR 1.0 07/15/2019 07:53 AM    INR 1.0 12/21/2016 01:24 PM      A1c Lab Results   Component Value Date/Time    Hemoglobin A1c 6.1 (H) 04/26/2019 08:50 AM    Hemoglobin A1c 6.2 (H) 10/26/2018 08:44 AM    Hemoglobin A1c 6.5 (H) 04/18/2018 10:18 AM Lipid Panel Lab Results   Component Value Date/Time    Cholesterol, total 154 04/26/2019 08:50 AM    HDL Cholesterol 43 04/26/2019 08:50 AM    LDL, calculated 87 04/26/2019 08:50 AM    VLDL, calculated 24 04/26/2019 08:50 AM    Triglyceride 121 04/26/2019 08:50 AM      Thyroid Panel Lab Results   Component Value Date/Time    TSH 2.600 10/26/2018 08:44 AM    TSH 2.250 07/26/2016 11:28 AM        Most Recent UA Lab Results   Component Value Date/Time    Color YELLOW 12/16/2013 09:30 PM    Appearance CLEAR 12/16/2013 09:30 PM    Specific gravity 1.007 12/16/2013 09:30 PM    pH (UA) 7.0 12/16/2013 09:30 PM    Protein NEGATIVE  12/16/2013 09:30 PM    Glucose NEGATIVE  12/16/2013 09:30 PM    Ketone 15 (A) 12/16/2013 09:30 PM    Bilirubin NEGATIVE  12/16/2013 09:30 PM    Blood MODERATE (A) 12/16/2013 09:30 PM    Urobilinogen 0.2 12/16/2013 09:30 PM    Nitrites NEGATIVE  12/16/2013 09:30 PM    Leukocyte Esterase NEGATIVE  12/16/2013 09:30 PM    WBC 0-3 12/16/2013 09:30 PM    RBC 0-3 12/16/2013 09:30 PM    Epithelial cells 0-3 12/16/2013 09:30 PM    Bacteria 0 12/16/2013 09:30 PM    Casts 0-3 HYALINE 12/16/2013 09:30 PM        Allergies   Allergen Reactions    Latex Other (comments)    Latex, Natural Rubber Itching     hives    Anoro Ellipta [Umeclidinium-Vilanterol] Other (comments)     Chest pain    Egg Itching     Itching with some tongue swelling and nausea    Avelox [Moxifloxacin] Swelling    Banana Swelling    Moxifloxacin Hcl Other (comments)    Sudafed [Pseudoephedrine Hcl] Palpitations    Valium [Diazepam] Other (comments)     angry    Watermelon Swelling     Immunization History   Administered Date(s) Administered    Influenza Vaccine (Quadrivalent) 10/22/2018    Pneumococcal Conjugate (PCV-13) 05/22/2015    Pneumococcal Polysaccharide (PPSV-23) 02/27/2014       All Labs from Last 24 Hrs:  Recent Results (from the past 24 hour(s))   GLUCOSE, POC    Collection Time: 03/21/20 10:53 AM   Result Value Ref Range    Glucose (POC) 119 (H) 65 - 100 mg/dL   GLUCOSE, POC    Collection Time: 03/21/20  4:06 PM   Result Value Ref Range    Glucose (POC) 201 (H) 65 - 100 mg/dL   GLUCOSE, POC    Collection Time: 03/21/20 10:14 PM   Result Value Ref Range    Glucose (POC) 93 65 - 100 mg/dL   GLUCOSE, POC    Collection Time: 03/22/20  7:08 AM   Result Value Ref Range    Glucose (POC) 130 (H) 65 - 100 mg/dL       Current Med List in Hospital:   Current Facility-Administered Medications   Medication Dose Route Frequency    heparin (porcine) injection 5,000 Units  5,000 Units SubCUTAneous Q8H    aspirin chewable tablet 81 mg  81 mg Oral DAILY    atorvastatin (LIPITOR) tablet 80 mg  80 mg Oral DAILY    clopidogreL (PLAVIX) tablet 75 mg  75 mg Oral DAILY    budesonide (PULMICORT) 500 mcg/2 ml nebulizer suspension  500 mcg Nebulization BID RT    benzonatate (TESSALON) capsule 100 mg  100 mg Oral TID PRN    citalopram (CELEXA) tablet 40 mg  40 mg Oral DAILY    dilTIAZem CD (CARDIZEM CD) capsule 240 mg  240 mg Oral DAILY    isosorbide mononitrate ER (IMDUR) tablet 120 mg  120 mg Oral DAILY    LORazepam (ATIVAN) tablet 0.5 mg  0.5 mg Oral BID    meclizine (ANTIVERT) tablet 25 mg  25 mg Oral TID PRN    nitroglycerin (NITROSTAT) tablet 0.4 mg  0.4 mg SubLINGual Q5MIN PRN    sodium chloride (NS) flush 5-40 mL  5-40 mL IntraVENous Q8H    sodium chloride (NS) flush 5-40 mL  5-40 mL IntraVENous PRN    ondansetron (ZOFRAN) injection 4 mg  4 mg IntraVENous Q6H PRN    insulin lispro (HUMALOG) injection   SubCUTAneous AC&HS       Discharge Exam:  Patient Vitals for the past 24 hrs:   Temp Pulse Resp BP SpO2   03/22/20 0800 98.5 °F (36.9 °C) 81 20 109/70 95 %   03/22/20 0722     92 %   03/22/20 0533 97.7 °F (36.5 °C) 74 23 98/56 91 %   03/22/20 0400 99.7 °F (37.6 °C) 74 23  98 %   03/21/20 2310 98 °F (36.7 °C) 84  (!) 119/95    03/21/20 2117 97.8 °F (36.6 °C) 84 23 101/55 94 %   03/21/20 2114     95 %   03/21/20 1600 97.9 °F (36.6 °C) 83 23 93/51 91 %   03/21/20 1200 97.9 °F (36.6 °C) 81 22 100/58 90 %     Oxygen Therapy  O2 Sat (%): 95 % (03/22/20 0800)  Pulse via Oximetry: 76 beats per minute (03/22/20 0722)  O2 Device: Room air (03/22/20 0722)  O2 Flow Rate (L/min): 2 l/min (03/21/20 0815)  FIO2 (%): 28 % (03/19/20 1912)    Estimated body mass index is 26.51 kg/m² as calculated from the following:    Height as of an earlier encounter on 3/19/20: 5' 6\" (1.676 m). Weight as of this encounter: 74.5 kg (164 lb 3.9 oz). No intake or output data in the 24 hours ending 03/22/20 7617    *Note that automatically entered I/Os may not be accurate; dependent on patient compliance with collection and accurate  by assistants. General:    Well nourished. Alert. Eyes:   Normal sclerae. Extraocular movements intact. ENT:  Normocephalic, atraumatic. Moist mucous membranes  CV:   Regular rate and rhythm. No murmur, rub, or gallop. Lungs:  Clear to auscultation bilaterally. No wheezing, rhonchi, or rales. Abdomen: Soft, nontender, nondistended. Extremities: Warm and dry. No cyanosis or edema. Neurologic: CN II-XII grossly intact. Mild R hand weakness. Skin:     No rashes or jaundice. Psych:  Normal mood and affect. Discharge Info:   Current Discharge Medication List      START taking these medications    Details   atorvastatin (LIPITOR) 40 mg tablet Take 1 Tab by mouth daily. Indications: stroke prevention  Qty: 30 Tab, Refills: 3      clopidogreL (PLAVIX) 75 mg tab Take 1 Tab by mouth daily. Indications: prevention for a blood clot going to the brain  Qty: 30 Tab, Refills: 2         CONTINUE these medications which have NOT CHANGED    Details   benzonatate (TESSALON PERLES) 100 mg capsule Take 100 mg by mouth three (3) times daily as needed for Cough. dilTIAZem CD (CARDIZEM CD) 240 mg ER capsule Take 1 Cap by mouth daily.   Qty: 30 Cap, Refills: 11    Associated Diagnoses: Essential hypertension; Palpitations      albuterol (PROAIR HFA) 90 mcg/actuation inhaler 2 puffs 4 times daily prn  Qty: 1 Inhaler, Refills: 11      albuterol-ipratropium (DUO-NEB) 2.5 mg-0.5 mg/3 ml nebu 1 vial via nebulizer qid. Indications: Bronchi Muscle Spasm resulting from COPD  Qty: 120 Nebule, Refills: 11      LORazepam (ATIVAN) 0.5 mg tablet Take one tablet three times a day when needed. Qty: 90 Tab, Refills: 5    Associated Diagnoses: Anxiety      citalopram (CELEXA) 40 mg tablet TAKE ONE TABLET BY MOUTH ONCE DAILY. Qty: 30 Tab, Refills: 11      metFORMIN (GLUCOPHAGE) 500 mg tablet TAKE ONE TABLET BY MOUTH DAILY WITH BREAKFAST  Qty: 30 Tab, Refills: 3      isosorbide mononitrate ER (IMDUR) 120 mg CR tablet TAKE ONE TABLET BY MOUTH EVERY MORNING  Qty: 30 Tab, Refills: 10    Associated Diagnoses: ASCVD (arteriosclerotic cardiovascular disease); Chest pain, unspecified type      cholecalciferol, vitamin D3, (VITAMIN D3) 2,000 unit tab Take  by mouth daily. nitroglycerin (NITROSTAT) 0.4 mg SL tablet 1 Tab by SubLINGual route every five (5) minutes as needed for Chest Pain. Up to 3 tablets in 15min  Qty: 25 Tab, Refills: 11      budesonide (PULMICORT) 0.5 mg/2 mL nbsp 2 mL by Nebulization route two (2) times a day. Dx: COPD J44.9, Bill to medicare part B.  Qty: 60 Each, Refills: 11      OXYGEN-AIR DELIVERY SYSTEMS 3 lpm AM and 2 lpm PM      meclizine (ANTIVERT) 25 mg tablet Take 1 Tab by mouth three (3) times daily as needed. Indications: VERTIGO  Qty: 30 Tab, Refills: 1      aspirin delayed-release 81 mg tablet Take 1 Tab by mouth daily.   Qty: 30 Tab, Refills: 11    Associated Diagnoses: Ischemic chest pain ([address] ASCVD (arteriosclerotic cardiovascular disease)         STOP taking these medications       amoxicillin-clavulanate (AUGMENTIN) 875-125 mg per tablet Comments:   Reason for Stopping:         predniSONE (DELTASONE) 10 mg tablet Comments:   Reason for Stopping:         pravastatin (PRAVACHOL) 20 mg tablet Comments: Reason for Stopping:                 Time spent in patient discharge planning and coordination 35 minutes.     Signed:  Marta Thornton MD

## 2020-04-27 PROBLEM — R41.3 MEMORY CHANGES: Status: ACTIVE | Noted: 2020-04-27

## 2020-05-15 PROBLEM — R91.8 LUNG NODULES: Status: ACTIVE | Noted: 2020-05-15

## 2020-06-12 ENCOUNTER — HOSPITAL ENCOUNTER (OUTPATIENT)
Dept: MRI IMAGING | Age: 60
Discharge: HOME | End: 2020-06-12
Attending: PSYCHIATRY & NEUROLOGY

## 2020-06-12 DIAGNOSIS — G45.9 TIA (TRANSIENT ISCHEMIC ATTACK): ICD-10-CM

## 2020-06-12 DIAGNOSIS — Z86.73 HISTORY OF STROKE: ICD-10-CM

## 2020-06-19 ENCOUNTER — HOSPITAL ENCOUNTER (OUTPATIENT)
Dept: LAB | Age: 60
Discharge: HOME | End: 2020-06-19
Attending: PSYCHIATRY & NEUROLOGY
Payer: COMMERCIAL

## 2020-06-19 ENCOUNTER — HOSPITAL ENCOUNTER (OUTPATIENT)
Dept: CT IMAGING | Age: 60
Discharge: HOME | End: 2020-06-19
Attending: NURSE PRACTITIONER

## 2020-06-19 DIAGNOSIS — R91.8 LUNG NODULES: ICD-10-CM

## 2020-06-19 DIAGNOSIS — G45.9 TIA (TRANSIENT ISCHEMIC ATTACK): ICD-10-CM

## 2020-06-19 DIAGNOSIS — Z86.73 HISTORY OF STROKE: ICD-10-CM

## 2020-06-19 DIAGNOSIS — Z51.81 THERAPEUTIC DRUG MONITORING: ICD-10-CM

## 2020-06-19 LAB
CHOLEST SERPL-MCNC: 147 MG/DL
EST. AVERAGE GLUCOSE BLD GHB EST-MCNC: 148 MG/DL
HBA1C MFR BLD: 6.8 % (ref 4.8–6)
HDLC SERPL-MCNC: 39 MG/DL (ref 40–60)
HDLC SERPL: 3.8 {RATIO}
LDLC SERPL CALC-MCNC: 68.6 MG/DL
LIPID PROFILE,FLP: ABNORMAL
P2Y12 PLT RESPONSE,PPPR: 122 PRU
TRIGL SERPL-MCNC: 197 MG/DL (ref 35–150)
VLDLC SERPL CALC-MCNC: 39.4 MG/DL (ref 6–23)

## 2020-06-19 PROCEDURE — 80061 LIPID PANEL: CPT

## 2020-06-19 PROCEDURE — 85576 BLOOD PLATELET AGGREGATION: CPT

## 2020-06-19 PROCEDURE — 83036 HEMOGLOBIN GLYCOSYLATED A1C: CPT

## 2020-06-19 PROCEDURE — 36415 COLL VENOUS BLD VENIPUNCTURE: CPT

## 2020-06-19 NOTE — PROGRESS NOTES
Spoke with the patient in regards to their CT scan results, [facility] to the patient [facility] shows nodules are stable and one is actually smaller. I also explained that we will follow up as planned with her. Patient understood the results and did not have any further questions or concerns at this time. // Kathi Ayon. A.

## 2020-06-19 NOTE — PROGRESS NOTES
Please let her know that CT shows nodules are stable and one is actually smaller. Follow up appt as planned.

## 2020-06-24 ENCOUNTER — HOSPITAL ENCOUNTER (OUTPATIENT)
Dept: MRI IMAGING | Age: 60
Discharge: HOME | End: 2020-06-24
Attending: PSYCHIATRY & NEUROLOGY
Payer: COMMERCIAL

## 2020-06-24 PROCEDURE — 70551 MRI BRAIN STEM W/O DYE: CPT

## 2021-06-21 ENCOUNTER — HOSPITAL ENCOUNTER (OUTPATIENT)
Dept: CT IMAGING | Age: 61
Discharge: HOME | End: 2021-06-21
Attending: NURSE PRACTITIONER
Payer: COMMERCIAL

## 2021-06-21 VITALS — WEIGHT: 144 LBS | BODY MASS INDEX: 23.14 KG/M2 | HEIGHT: 66 IN

## 2021-06-21 DIAGNOSIS — F17.218 CIGARETTE NICOTINE DEPENDENCE WITH OTHER NICOTINE-INDUCED DISORDER: ICD-10-CM

## 2021-06-21 PROBLEM — R05.3 CHRONIC COUGH: Status: ACTIVE | Noted: 2021-06-21

## 2021-06-21 PROBLEM — S91.331A PUNCTURE WOUND OF RIGHT FOOT: Status: ACTIVE | Noted: 2021-06-21

## 2021-06-21 PROCEDURE — 71271 CT THORAX LUNG CANCER SCR C-: CPT

## 2021-06-24 NOTE — PROGRESS NOTES
Called the patient and gave her results of CT as instructed by Ms. Sintia LOVE, CT shows stable small nodules and she advises to take another CT in 1 year as screening study.  She verbalized understanding and was pleased with results Lorenzo Bryant

## 2021-08-11 PROBLEM — R07.9 CHEST PAIN: Status: ACTIVE | Noted: 2021-08-11

## 2022-03-18 PROBLEM — R41.3 MEMORY CHANGES: Status: ACTIVE | Noted: 2020-04-27

## 2022-03-18 PROBLEM — N30.90 CYSTITIS: Status: ACTIVE | Noted: 2019-06-10

## 2022-03-18 PROBLEM — R00.2 PALPITATIONS: Status: ACTIVE | Noted: 2019-01-25

## 2022-03-18 PROBLEM — J40 TRACHEOBRONCHITIS: Status: ACTIVE | Noted: 2017-06-08

## 2022-03-18 PROBLEM — J41.8 MIXED SIMPLE AND MUCOPURULENT CHRONIC BRONCHITIS (HCC): Status: ACTIVE | Noted: 2018-06-22

## 2022-03-18 PROBLEM — E55.9 VITAMIN D DEFICIENCY: Status: ACTIVE | Noted: 2019-04-22

## 2022-03-18 PROBLEM — Z99.81 DEPENDENCE ON CONTINUOUS SUPPLEMENTAL OXYGEN: Status: ACTIVE | Noted: 2018-04-18

## 2022-03-18 PROBLEM — J96.11 CHRONIC RESPIRATORY FAILURE WITH HYPOXIA (HCC): Status: ACTIVE | Noted: 2020-03-21

## 2022-03-18 PROBLEM — D75.1 POLYCYTHEMIA: Status: ACTIVE | Noted: 2017-06-09

## 2022-03-19 PROBLEM — Z72.0 TOBACCO USE: Status: ACTIVE | Noted: 2017-06-08

## 2022-03-19 PROBLEM — G89.4 CHRONIC PAIN SYNDROME: Status: ACTIVE | Noted: 2018-04-18

## 2022-03-19 PROBLEM — S91.331A PUNCTURE WOUND OF RIGHT FOOT: Status: ACTIVE | Noted: 2021-06-21

## 2022-03-19 PROBLEM — J44.9 COPD (CHRONIC OBSTRUCTIVE PULMONARY DISEASE) (HCC): Status: ACTIVE | Noted: 2017-06-08

## 2022-03-19 PROBLEM — R07.9 CHEST PAIN: Status: ACTIVE | Noted: 2021-08-11

## 2022-03-19 PROBLEM — R05.3 CHRONIC COUGH: Status: ACTIVE | Noted: 2021-06-21

## 2022-03-20 PROBLEM — R91.8 LUNG NODULES: Status: ACTIVE | Noted: 2020-05-15

## 2022-03-20 PROBLEM — I63.9 CVA (CEREBRAL VASCULAR ACCIDENT) (HCC): Status: ACTIVE | Noted: 2020-03-19

## 2022-03-29 PROBLEM — M51.369 DEGENERATION OF LUMBAR INTERVERTEBRAL DISC: Status: ACTIVE | Noted: 2022-03-29

## 2022-03-29 PROBLEM — M51.36 DEGENERATION OF LUMBAR INTERVERTEBRAL DISC: Status: ACTIVE | Noted: 2022-03-29

## 2022-03-29 PROBLEM — G47.00 INSOMNIA: Status: ACTIVE | Noted: 2022-03-29

## 2022-03-29 PROBLEM — M25.559 PAIN IN JOINT, PELVIC REGION AND THIGH: Status: ACTIVE | Noted: 2022-03-29

## 2022-04-22 ENCOUNTER — APPOINTMENT (RX ONLY)
Dept: URBAN - METROPOLITAN AREA CLINIC 24 | Facility: CLINIC | Age: 62
Setting detail: DERMATOLOGY
End: 2022-04-22

## 2022-04-22 DIAGNOSIS — Z71.89 OTHER SPECIFIED COUNSELING: ICD-10-CM

## 2022-04-22 DIAGNOSIS — L82.1 OTHER SEBORRHEIC KERATOSIS: ICD-10-CM

## 2022-04-22 DIAGNOSIS — D22 MELANOCYTIC NEVI: ICD-10-CM

## 2022-04-22 DIAGNOSIS — L57.8 OTHER SKIN CHANGES DUE TO CHRONIC EXPOSURE TO NONIONIZING RADIATION: ICD-10-CM

## 2022-04-22 PROBLEM — D22.21 MELANOCYTIC NEVI OF RIGHT EAR AND EXTERNAL AURICULAR CANAL: Status: ACTIVE | Noted: 2022-04-22

## 2022-04-22 PROCEDURE — ? COUNSELING

## 2022-04-22 PROCEDURE — 99242 OFF/OP CONSLTJ NEW/EST SF 20: CPT | Mod: 25

## 2022-04-22 PROCEDURE — 11311 SHAVE SKIN LESION 0.6-1.0 CM: CPT

## 2022-04-22 PROCEDURE — ? SHAVE REMOVAL

## 2022-04-22 ASSESSMENT — LOCATION DETAILED DESCRIPTION DERM
LOCATION DETAILED: LEFT SUPERIOR FOREHEAD
LOCATION DETAILED: RIGHT SUPERIOR HELIX
LOCATION DETAILED: RIGHT INFERIOR MEDIAL FOREHEAD

## 2022-04-22 ASSESSMENT — LOCATION SIMPLE DESCRIPTION DERM
LOCATION SIMPLE: RIGHT FOREHEAD
LOCATION SIMPLE: LEFT FOREHEAD
LOCATION SIMPLE: RIGHT EAR

## 2022-04-22 ASSESSMENT — LOCATION ZONE DERM
LOCATION ZONE: EAR
LOCATION ZONE: FACE

## 2022-04-22 NOTE — HPI: DERMATOLOGY CONSULTATION
How Severe Is Your Condition? (The Patient Describes The Severity Level As....): moderate
What Is The Consultation For? (Seen In Consultation For...): Darkening lesion on right ear
Which Provider Consulted Us?: Dr.Connie Gr
Where On Your Body Is It? (Located On The...): Right ear

## 2022-04-22 NOTE — PROCEDURE: SHAVE REMOVAL
Medical Necessity Information: It is in your best interest to select a reason for this procedure from the list below. All of these items fulfill various CMS LCD requirements except the new and changing color options.
Notification Instructions: Patient will be notified of pathology results. However, patient instructed to call the office if not contacted within 2 weeks.
Post-Care Instructions: I reviewed with the patient in detail post-care instructions. Patient is to keep the biopsy site dry overnight, and then apply bacitracin twice daily until healed. Patient may apply hydrogen peroxide soaks to remove any crusting.
Add Variable For Additional Medical Justification: No
Anesthesia Type: 1% lidocaine without epinephrine and a 1:10 solution of 8.4% sodium bicarbonate
Accession #: PC
Consent was obtained from the patient. The risks and benefits to therapy were discussed in detail. Specifically, the risks of infection, scarring, bleeding, prolonged wound healing, incomplete removal, allergy to anesthesia, nerve injury and recurrence were addressed. Prior to the procedure, the treatment site was clearly identified and confirmed by the patient. All components of Universal Protocol/PAUSE Rule completed.
Billing Type: Third-Party Bill
X Size Of Lesion In Cm (Optional): 0
Was A Bandage Applied: Yes
Wound Care: Vaseline
Hemostasis: Aluminum Chloride
Size Of Lesion In Cm (Required): 0.8
Biopsy Method: Dermablade
Medical Necessity Clause: This procedure was medically necessary because the lesion that was treated was:
Detail Level: Detailed
Anesthesia Volume In Cc: 0.5

## 2022-06-20 DIAGNOSIS — Z86.73 PERSONAL HISTORY OF TRANSIENT ISCHEMIC ATTACK (TIA), AND CEREBRAL INFARCTION WITHOUT RESIDUAL DEFICITS: ICD-10-CM

## 2022-06-20 RX ORDER — CLOPIDOGREL BISULFATE 75 MG/1
TABLET ORAL
Qty: 90 TABLET | Refills: 2 | Status: SHIPPED | OUTPATIENT
Start: 2022-06-20

## 2022-06-22 ENCOUNTER — HOSPITAL ENCOUNTER (OUTPATIENT)
Dept: CT IMAGING | Age: 62
Discharge: HOME | End: 2022-06-25
Payer: COMMERCIAL

## 2022-06-22 DIAGNOSIS — F17.218 CIGARETTE NICOTINE DEPENDENCE WITH OTHER NICOTINE-INDUCED DISORDER: ICD-10-CM

## 2022-06-22 PROCEDURE — 71271 CT THORAX LUNG CANCER SCR C-: CPT

## 2022-06-27 ENCOUNTER — CLINICAL DOCUMENTATION (OUTPATIENT)
Dept: PULMONOLOGY | Age: 62
End: 2022-06-27

## 2022-06-27 NOTE — RESULT ENCOUNTER NOTE
Chest CT is personally is personally reviewed, scarring noted in GLEN[place], stable. Centrilobular emphysema. Area of architectural distortion in infiltrate-mild, RUL is new, possibly infectious or inflammatory in nature. Calcified granuloma RML. No new or enlarging lung nodules. Called and LM to return call, may need antibiotic or steroid or both if she is having symptoms.

## 2022-06-28 ENCOUNTER — TELEPHONE (OUTPATIENT)
Dept: PULMONOLOGY | Age: 62
End: 2022-06-28

## 2022-07-01 RX ORDER — PREDNISONE 20 MG/1
TABLET ORAL
Qty: 15 TABLET | Refills: 0 | Status: SHIPPED | OUTPATIENT
Start: 2022-07-01 | End: 2022-07-18 | Stop reason: ALTCHOICE

## 2022-07-01 RX ORDER — DOXYCYCLINE HYCLATE 100 MG/1
100 CAPSULE ORAL 2 TIMES DAILY
Qty: 14 CAPSULE | Refills: 0 | Status: SHIPPED | OUTPATIENT
Start: 2022-07-01 | End: 2022-07-08

## 2022-07-01 NOTE — TELEPHONE ENCOUNTER
Chest CT is personally is personally reviewed, scarring noted in GLENNY, stable.  Centrilobular emphysema. Area of architectural distortion in infiltrate-mild, RUL is new, possibly infectious or inflammatory in nature.  Calcified granuloma RML.  No new or enlarging lung nodules. She returned my call, is having cough with purulent sputum, ? Some nocturnal wheezing. Will tx w/ doxycycline and prednisone (issues with potential interaction with Zithromax and other Rx, plus she states that she usually does not have good response to Zithromax. Will keep follow-up as planned in November and discuss follow-up CT for June of next year at that visit.   However she is advised to contact us sooner if she has persistent or worsening cough with purulent sputum or if she develops fever.

## 2022-07-18 ENCOUNTER — OFFICE VISIT (OUTPATIENT)
Dept: INTERNAL MEDICINE CLINIC | Facility: CLINIC | Age: 62
End: 2022-07-18
Payer: COMMERCIAL

## 2022-07-18 VITALS
HEIGHT: 66 IN | BODY MASS INDEX: 21.44 KG/M2 | DIASTOLIC BLOOD PRESSURE: 60 MMHG | SYSTOLIC BLOOD PRESSURE: 122 MMHG | WEIGHT: 133.4 LBS

## 2022-07-18 DIAGNOSIS — I10 ESSENTIAL HYPERTENSION: ICD-10-CM

## 2022-07-18 DIAGNOSIS — F41.9 ANXIETY: ICD-10-CM

## 2022-07-18 DIAGNOSIS — E78.2 MIXED HYPERLIPIDEMIA: ICD-10-CM

## 2022-07-18 DIAGNOSIS — E11.9 TYPE 2 DIABETES MELLITUS WITHOUT COMPLICATION, WITHOUT LONG-TERM CURRENT USE OF INSULIN (HCC): ICD-10-CM

## 2022-07-18 DIAGNOSIS — I10 ESSENTIAL HYPERTENSION: Primary | ICD-10-CM

## 2022-07-18 DIAGNOSIS — I25.10 ASCVD (ARTERIOSCLEROTIC CARDIOVASCULAR DISEASE): ICD-10-CM

## 2022-07-18 DIAGNOSIS — J44.9 CHRONIC OBSTRUCTIVE PULMONARY DISEASE, UNSPECIFIED COPD TYPE (HCC): ICD-10-CM

## 2022-07-18 DIAGNOSIS — M79.7 FIBROMYALGIA: ICD-10-CM

## 2022-07-18 DIAGNOSIS — G89.4 CHRONIC PAIN SYNDROME: ICD-10-CM

## 2022-07-18 DIAGNOSIS — R41.89 SIGNS AND SYMPTOMS INVOLVING COGNITION: ICD-10-CM

## 2022-07-18 LAB
ALBUMIN SERPL-MCNC: 3.8 G/DL (ref 3.2–4.6)
ALBUMIN/GLOB SERPL: 1.1 {RATIO} (ref 1.2–3.5)
ALP SERPL-CCNC: 94 U/L (ref 50–136)
ALT SERPL-CCNC: 23 U/L (ref 12–65)
ANION GAP SERPL CALC-SCNC: 3 MMOL/L (ref 7–16)
AST SERPL-CCNC: 20 U/L (ref 15–37)
BASOPHILS # BLD: 0.1 K/UL (ref 0–0.2)
BASOPHILS NFR BLD: 1 % (ref 0–2)
BILIRUB SERPL-MCNC: 0.5 MG/DL (ref 0.2–1.1)
BUN SERPL-MCNC: 15 MG/DL (ref 8–23)
CALCIUM SERPL-MCNC: 9.3 MG/DL (ref 8.3–10.4)
CHLORIDE SERPL-SCNC: 109 MMOL/L (ref 98–107)
CHOLEST SERPL-MCNC: 154 MG/DL
CO2 SERPL-SCNC: 28 MMOL/L (ref 21–32)
CREAT SERPL-MCNC: 0.9 MG/DL (ref 0.6–1)
DIFFERENTIAL METHOD BLD: ABNORMAL
EOSINOPHIL # BLD: 0.1 K/UL (ref 0–0.8)
EOSINOPHIL NFR BLD: 1 % (ref 0.5–7.8)
ERYTHROCYTE [DISTWIDTH] IN BLOOD BY AUTOMATED COUNT: 15.5 % (ref 11.9–14.6)
EST. AVERAGE GLUCOSE BLD GHB EST-MCNC: 126 MG/DL
GLOBULIN SER CALC-MCNC: 3.4 G/DL (ref 2.3–3.5)
GLUCOSE SERPL-MCNC: 127 MG/DL (ref 65–100)
HBA1C MFR BLD: 6 % (ref 4.8–5.6)
HCT VFR BLD AUTO: 51.7 % (ref 35.8–46.3)
HDLC SERPL-MCNC: 50 MG/DL (ref 40–60)
HDLC SERPL: 3.1 {RATIO}
HGB BLD-MCNC: 15.9 G/DL (ref 11.7–15.4)
IMM GRANULOCYTES # BLD AUTO: 0 K/UL (ref 0–0.5)
IMM GRANULOCYTES NFR BLD AUTO: 0 % (ref 0–5)
LDLC SERPL CALC-MCNC: 73 MG/DL
LYMPHOCYTES # BLD: 1.7 K/UL (ref 0.5–4.6)
LYMPHOCYTES NFR BLD: 14 % (ref 13–44)
MCH RBC QN AUTO: 29.6 PG (ref 26.1–32.9)
MCHC RBC AUTO-ENTMCNC: 30.8 G/DL (ref 31.4–35)
MCV RBC AUTO: 96.1 FL (ref 79.6–97.8)
MONOCYTES # BLD: 0.8 K/UL (ref 0.1–1.3)
MONOCYTES NFR BLD: 7 % (ref 4–12)
NEUTS SEG # BLD: 9.3 K/UL (ref 1.7–8.2)
NEUTS SEG NFR BLD: 77 % (ref 43–78)
NRBC # BLD: 0 K/UL (ref 0–0.2)
PLATELET # BLD AUTO: 303 K/UL (ref 150–450)
PMV BLD AUTO: 9.2 FL (ref 9.4–12.3)
POTASSIUM SERPL-SCNC: 4.2 MMOL/L (ref 3.5–5.1)
PROT SERPL-MCNC: 7.2 G/DL (ref 6.3–8.2)
RBC # BLD AUTO: 5.38 M/UL (ref 4.05–5.2)
SODIUM SERPL-SCNC: 140 MMOL/L (ref 136–145)
TRIGL SERPL-MCNC: 155 MG/DL (ref 35–150)
VLDLC SERPL CALC-MCNC: 31 MG/DL (ref 6–23)
WBC # BLD AUTO: 12.1 K/UL (ref 4.3–11.1)

## 2022-07-18 PROCEDURE — 3044F HG A1C LEVEL LT 7.0%: CPT | Performed by: INTERNAL MEDICINE

## 2022-07-18 PROCEDURE — 99214 OFFICE O/P EST MOD 30 MIN: CPT | Performed by: INTERNAL MEDICINE

## 2022-07-18 RX ORDER — TRAMADOL HYDROCHLORIDE 50 MG/1
50 TABLET ORAL EVERY 8 HOURS PRN
Qty: 90 TABLET | Refills: 3 | Status: SHIPPED | OUTPATIENT
Start: 2022-07-18 | End: 2022-11-15

## 2022-07-18 RX ORDER — LORAZEPAM 0.5 MG/1
0.5 TABLET ORAL DAILY
Qty: 30 TABLET | Refills: 2 | Status: SHIPPED | OUTPATIENT
Start: 2022-07-18 | End: 2022-10-16

## 2022-07-18 ASSESSMENT — ENCOUNTER SYMPTOMS
COUGH: 1
SHORTNESS OF BREATH: 1

## 2022-07-18 ASSESSMENT — PATIENT HEALTH QUESTIONNAIRE - PHQ9
1. LITTLE INTEREST OR PLEASURE IN DOING THINGS: 0
2. FEELING DOWN, DEPRESSED OR HOPELESS: 0
SUM OF ALL RESPONSES TO PHQ9 QUESTIONS 1 & 2: 0
SUM OF ALL RESPONSES TO PHQ QUESTIONS 1-9: 0

## 2022-07-18 NOTE — PROGRESS NOTES
HPI: Karolina Delgado (: 1960)    Need to update labs and RX today    Has been on steroids and abx recently for her pulmonary disease    Is taking her Ativan once daily and her pain meds 3 times a day so had discussion about drug interaction and pt aware    Since her stroke she has noticed her memory - short term has worsened    She has noticed reading that she gets tearing of her right eye at times       Problem List:  Patient Active Problem List   Diagnosis    Dependence on continuous supplemental oxygen    Polycythemia    Chronic respiratory failure with hypoxia (HCC)    Tracheobronchitis    Memory changes    Vitamin D deficiency    Mixed simple and mucopurulent chronic bronchitis (HCC)    Palpitations    Cystitis    Diabetes ([address]    Irritable bowel syndrome    Chronic pain syndrome    Snoring    Chronic cough    Fibromyalgia    Essential hypertension    Tobacco use    Depression    Anxiety    Puncture wound of right foot    Nicotine dependence    Angina pectoris (HCC)    ASCVD (arteriosclerotic cardiovascular disease)    COPD (chronic obstructive pulmonary disease) ([address]    Chest pain    Arthritis    Liver disease    GERD (gastroesophageal reflux disease)    Mixed hyperlipidemia    Hypoxia    Lung nodules    CVA (cerebral vascular accident) ([address]    Osteoarthritis    Degeneration of lumbar intervertebral disc    Insomnia    Pain in joint, pelvic region and thigh    Signs and symptoms involving cognition       History:  Past Medical History:   Diagnosis Date    Acute renal failure ([address] 2013    Baseline creatinine was 0.8, and currently it is 2.9     Acute respiratory failure ([address] 2013    Adverse effect of anesthesia     difficulty waking up    Arthritis     Back pain     CAD (coronary artery disease)     CAP (community acquired pneumonia) 2013    COPD exacerbation ([address] 2017    Oxygen at 3lpm continuously    CVA (cerebral vascular accident) ([address] 2020    left MCA with right sided weakness- S/P TPA    Cystitis     Diabetes ([facility] [facility]     Difficult intubation 2005    with spine surgery at [facility]    Hyperlipidemia     on statin    Hypertension     Hypoproteinemia ([address] 12/11/2013    Hypotension, unspecified 12/5/2013    Hypoxemia 12/20/2013    follow up overnight oximetry on room air 3/2014 - resolved. Ill-defined condition     hx of IC     Migraine headache     Myalgia     Pleural effusion 12/17/2013    Left: 450 ml of fluid was obtained       Positive occult stool blood test 12/9/2013    Psychiatric disorder     Respiratory failure ([address] 12/2013    required intubation    Sepsis ([address] 12/7/2013    Septicemia ([facility] [facility] Dec 2013    no BP, pneumonia, \"Everything was failing\"- on vent 12/6-12/17    Unspecified adverse effect of anesthesia     took a long time to wake up 2005    Upper GI bleed 12/10/2013    GI evaluated        Allergies: Allergies   Allergen Reactions    Latex Other (See Comments)    Umeclidinium-Vilanterol Other (See Comments)     Chest pain    Albumen, Egg Itching     Itching with some tongue swelling and nausea    Banana Swelling    Citrullus Vulgaris Swelling    Diazepam Other (See Comments)     angry    Moxifloxacin Other (See Comments) and Swelling    Pseudoephedrine Hcl Palpitations       Current Medications:  Current Outpatient Medications   Medication Sig Dispense Refill    traMADol (ULTRAM) 50 MG tablet Take 1 tablet by mouth every 8 hours as needed for Pain for up to 120 days. 90 tablet 3    LORazepam (ATIVAN) 0.5 MG tablet Take 1 tablet by mouth in the morning for 90 days. 30 tablet 2    clopidogrel (PLAVIX) 75 MG tablet TAKE 1 TABLET BY MOUTH DAILY.  INDICATIONS: PREVENTION FOR A BLOOD CLOT GOING TO THE BRAIN 90 tablet 2    albuterol sulfate  (90 Base) MCG/ACT inhaler 2 puffs 4 times daily prn      aspirin 81 MG EC tablet Take by mouth daily      atorvastatin (LIPITOR) 40 MG tablet TAKE 1 TABLET BY MOUTH EVERY DAY      azelastine (ASTELIN) 0.1 % nasal spray 1 spray by Nasal route 2 times daily      benzonatate (TESSALON) 200 MG capsule Take 200 mg by mouth 3 times daily as needed      budesonide (PULMICORT) 0.5 MG/2ML nebulizer suspension Inhale 500 mcg into the lungs 2 times daily      Cholecalciferol 50 MCG (2000 UT) TABS Take by mouth daily      citalopram (CELEXA) 40 MG tablet TAKE 1 TABLET BY MOUTH EVERY DAY      dilTIAZem (TIAZAC) 180 MG extended release capsule TAKE 1 CAPSULE BY MOUTH EVERY DAY      ipratropium-albuterol (DUONEB) 0.5-2.5 (3) MG/3ML SOLN nebulizer solution 1 vial via nebulizer qid. Indications: bronchi muscle spasm resulting from COPD      isosorbide mononitrate (IMDUR) 120 MG extended release tablet 1 tablet daily      meclizine (ANTIVERT) 25 MG tablet Take 25 mg by mouth 3 times daily as needed      metFORMIN (GLUCOPHAGE) 500 MG tablet TAKE TABLET BY MOUTH DAILY WITH BREAKFAST      nitroGLYCERIN (NITROSTAT) 0.4 MG SL tablet Place 1 sl under the tongue q 5 min prn cp, max 3 sl in a 15-min time period. Call 911 if no relief after the 3rd sl. No current facility-administered medications for this visit. Review of Systems:  Review of Systems   Constitutional:  Positive for fatigue. Respiratory:  Positive for cough and shortness of breath. Cardiovascular:  Negative for chest pain. Musculoskeletal:  Positive for arthralgias and myalgias. All other systems reviewed and are negative. Vitals:  /60   Ht 5' 6\" (1.676 m)   Wt 133 lb 6.4 oz (60.5 kg)   BMI 21.53 kg/m²     Physical Exam:  Physical Exam  Vitals reviewed. Constitutional:       Appearance: Normal appearance. HENT:      Head: Normocephalic and atraumatic. Eyes:      Extraocular Movements: Extraocular movements intact. Pupils: Pupils are equal, round, and reactive to light. Cardiovascular:      Rate and Rhythm: Normal rate and regular rhythm. Heart sounds: Normal heart sounds.    Pulmonary:      Effort: Pulmonary effort is normal.      Breath sounds: Normal breath sounds. Musculoskeletal:         General: Normal range of motion. Cervical back: Normal range of motion and neck supple. Skin:     General: Skin is warm and dry. Neurological:      General: No focal deficit present. Mental Status: She is alert and oriented to person, place, and time. Psychiatric:         Mood and Affect: Mood normal.         Behavior: Behavior normal.         Thought Content: Thought content normal.         Judgment: Judgment normal.        Assessment/Plan:   Huong Craven was seen today for follow-up and fatigue. Diagnoses and all orders for this visit:    Essential hypertension  -     CBC with Auto Differential; Future  -     Comprehensive Metabolic Panel; Future  BP stable  Chronic obstructive pulmonary disease, unspecified COPD type ([facility] [facility]  Follow with pulmonary  ASCVD (arteriosclerotic cardiovascular disease)  Continue all medications  Chronic pain syndrome  -     traMADol (ULTRAM) 50 MG tablet; Take 1 tablet by mouth every 8 hours as needed for Pain for up to 120 days. Discussed decrease in dose of Tramadol and avoid too much IBU   States Tylenol does not help her pain  Fibromyalgia  -     traMADol (ULTRAM) 50 MG tablet; Take 1 tablet by mouth every 8 hours as needed for Pain for up to 120 days. On Celexa but could consider Cymbalta  Anxiety  -     LORazepam (ATIVAN) 0.5 MG tablet; Take 1 tablet by mouth in the morning for 90 days. Has only been taking it daily so med adjusted  Type 2 diabetes mellitus without complication, without long-term current use of insulin (HCC)  -     Hemoglobin A1C; Future  Continue Metformin and diet control  Mixed hyperlipidemia  -     Lipid Panel;  Future  On statin therapy  Signs and symptoms involving cognition      Discussed use of lists and avoid stimulus overload- prob has vascular changes    For eyes recommend Refresh drops, warm compresses and try good cleanse with Abhinav's Baby shampoo but if does not work then    Current medications are therapeutic at this time; continue as prescribed.         Erica Sen MD

## 2022-08-04 NOTE — TELEPHONE ENCOUNTER
Requested Prescriptions     Pending Prescriptions Disp Refills    metFORMIN (GLUCOPHAGE) 500 MG tablet [Pharmacy Med Name: METFORMIN  MG TABLET] 90 tablet 2     Sig: TAKE TABLET BY MOUTH DAILY WITH BREAKFAST

## 2022-08-24 ENCOUNTER — OFFICE VISIT (OUTPATIENT)
Dept: CARDIOLOGY CLINIC | Age: 62
End: 2022-08-24
Payer: COMMERCIAL

## 2022-08-24 VITALS
SYSTOLIC BLOOD PRESSURE: 110 MMHG | HEART RATE: 86 BPM | WEIGHT: 135.5 LBS | HEIGHT: 66 IN | DIASTOLIC BLOOD PRESSURE: 72 MMHG | BODY MASS INDEX: 21.78 KG/M2

## 2022-08-24 DIAGNOSIS — R00.2 PALPITATIONS: ICD-10-CM

## 2022-08-24 DIAGNOSIS — I10 ESSENTIAL HYPERTENSION: Primary | ICD-10-CM

## 2022-08-24 DIAGNOSIS — I25.10 ASCVD (ARTERIOSCLEROTIC CARDIOVASCULAR DISEASE): ICD-10-CM

## 2022-08-24 PROCEDURE — 93000 ELECTROCARDIOGRAM COMPLETE: CPT | Performed by: INTERNAL MEDICINE

## 2022-08-24 PROCEDURE — 99214 OFFICE O/P EST MOD 30 MIN: CPT | Performed by: INTERNAL MEDICINE

## 2022-08-24 ASSESSMENT — ENCOUNTER SYMPTOMS
HOARSE VOICE: 0
VOMITING: 0
DIARRHEA: 0
COLOR CHANGE: 0
HEMATEMESIS: 0
COUGH: 0
HEMATOCHEZIA: 0
SHORTNESS OF BREATH: 0
WHEEZING: 0
NAUSEA: 0
ABDOMINAL PAIN: 0
SPUTUM PRODUCTION: 0
BLURRED VISION: 0
BOWEL INCONTINENCE: 0
ORTHOPNEA: 0

## 2022-08-24 ASSESSMENT — PATIENT HEALTH QUESTIONNAIRE - PHQ9
SUM OF ALL RESPONSES TO PHQ9 QUESTIONS 1 & 2: 0
SUM OF ALL RESPONSES TO PHQ QUESTIONS 1-9: 0
1. LITTLE INTEREST OR PLEASURE IN DOING THINGS: 0
SUM OF ALL RESPONSES TO PHQ QUESTIONS 1-9: 0
SUM OF ALL RESPONSES TO PHQ QUESTIONS 1-9: 0
2. FEELING DOWN, DEPRESSED OR HOPELESS: 0
SUM OF ALL RESPONSES TO PHQ QUESTIONS 1-9: 0

## 2022-08-24 NOTE — PROGRESS NOTES
[facility] CARDIOLOGY  [address], [address] [facility], [address] [place] [address]  PHONE: [phone]        22        NAME:  Elaine Gracia  : 1960  MRN: [id number]       SUBJECTIVE:   Elaine Gracia is a 58 y.o. female seen for a follow up visit regarding the following: The patient has a hx of moderate CAD with chest pain,CVA,COPD,and primary hypertension. She returns for scheduled follow up. She complains of rare ,brief,nocturnal tachycardia which has awakened her from sleep. Chief Complaint   Patient presents with    Coronary Artery Disease    Hypertension     6 month visit and med review    Palpitations       HPI:    Coronary Artery Disease  Symptoms include palpitations. Pertinent negatives include no chest pain, chest pressure, dizziness, leg swelling or shortness of breath. Hypertension  This is a chronic problem. The problem is controlled. Associated symptoms include palpitations. Pertinent negatives include no anxiety, blurred vision, chest pain, headaches, malaise/fatigue, neck pain, orthopnea, peripheral edema, PND, shortness of breath or sweats. Palpitations   This is a recurrent problem. Episode onset: several months ago. She experiences nocturnal tachycardia. Symptoms occur 2-3 times/month and last 1-2 minutes. The problem has been unchanged. Nothing aggravates the symptoms. Pertinent negatives include no anxiety, chest fullness, chest pain, coughing, diaphoresis, dizziness, fever, irregular heartbeat, malaise/fatigue, nausea, near-syncope, numbness, shortness of breath, syncope, vomiting or weakness. Treatments tried: Calcium channel blocker. Past Medical History, Past Surgical History, Family history, Social History, and Medications were all reviewed with the patient today and updated as necessary.          Current Outpatient Medications:     metFORMIN (GLUCOPHAGE) 500 MG tablet, TAKE TABLET BY MOUTH DAILY WITH BREAKFAST, Disp: 90 tablet, Rfl: 2    traMADol (ULTRAM) 50 MG tablet, Take 1 tablet by mouth every 8 hours as needed for Pain for up to 120 days. , Disp: 90 tablet, Rfl: 3    LORazepam (ATIVAN) 0.5 MG tablet, Take 1 tablet by mouth in the morning for 90 days. , Disp: 30 tablet, Rfl: 2    clopidogrel (PLAVIX) 75 MG tablet, TAKE 1 TABLET BY MOUTH DAILY. INDICATIONS: PREVENTION FOR A BLOOD CLOT GOING TO THE BRAIN, Disp: 90 tablet, Rfl: 2    albuterol sulfate  (90 Base) MCG/ACT inhaler, 2 puffs 4 times daily prn, Disp: , Rfl:     aspirin 81 MG EC tablet, Take by mouth daily, Disp: , Rfl:     atorvastatin (LIPITOR) 40 MG tablet, TAKE 1 TABLET BY MOUTH EVERY DAY, Disp: , Rfl:     azelastine (ASTELIN) 0.1 % nasal spray, 1 spray by Nasal route 2 times daily As needed, Disp: , Rfl:     benzonatate (TESSALON) 200 MG capsule, Take 200 mg by mouth 3 times daily as needed, Disp: , Rfl:     budesonide (PULMICORT) 0.5 MG/2ML nebulizer suspension, Inhale 500 mcg into the lungs 2 times daily, Disp: , Rfl:     Cholecalciferol 50 MCG (2000 UT) TABS, Take by mouth daily, Disp: , Rfl:     citalopram (CELEXA) 40 MG tablet, TAKE 1 TABLET BY MOUTH EVERY DAY, Disp: , Rfl:     dilTIAZem (TIAZAC) 180 MG extended release capsule, TAKE 1 CAPSULE BY MOUTH EVERY DAY, Disp: , Rfl:     ipratropium-albuterol (DUONEB) 0.5-2.5 (3) MG/3ML SOLN nebulizer solution, 1 vial via nebulizer qid. Indications: bronchi muscle spasm resulting from COPD, Disp: , Rfl:     isosorbide mononitrate (IMDUR) 120 MG extended release tablet, 1 tablet daily, Disp: , Rfl:     meclizine (ANTIVERT) 25 MG tablet, Take 25 mg by mouth 3 times daily as needed, Disp: , Rfl:     nitroGLYCERIN (NITROSTAT) 0.4 MG SL tablet, Place 1 sl under the tongue q 5 min prn cp, max 3 sl in a 15-min time period.  Call 911 if no relief after the 3rd sl., Disp: , Rfl:   Allergies   Allergen Reactions    Latex Other (See Comments)    Umeclidinium-Vilanterol Other (See Comments)     Chest pain    Albumen, Egg Itching     Itching with some tongue swelling and nausea stenosis. mRCA:60% stenosis with normal IFR (1.0) and irregularities    CHOLECYSTECTOMY      HYSTERECTOMY (CERVIX STATUS UNKNOWN)      hyesterectomy    NEUROLOGICAL SURGERY  2000 & 2005    spine X 4    ORTHOPEDIC SURGERY Right 10/2014    elbow    ORTHOPEDIC SURGERY      4 back surgeries     Family History   Problem Relation Age of Onset    Cancer Mother         colon    Alzheimer's Disease Mother     Heart Disease Father       Social History     Tobacco Use    Smoking status: Every Day     Packs/day: 1.00     Types: Cigarettes    Smokeless tobacco: Never    Tobacco comments:     Quit smoking: currently smoking 1/2 to 3/4  ppd   Substance Use Topics    Alcohol use: Yes     Alcohol/week: 0.0 standard drinks       ROS:    Review of Systems   Constitutional: Negative for chills, decreased appetite, diaphoresis, fever and malaise/fatigue. HENT:  Negative for congestion, hearing loss, hoarse voice and nosebleeds. Eyes:  Negative for blurred vision. Cardiovascular:  Positive for palpitations. Negative for chest pain, claudication, cyanosis, dyspnea on exertion, irregular heartbeat, leg swelling, near-syncope, orthopnea, paroxysmal nocturnal dyspnea and syncope. Respiratory:  Negative for cough, shortness of breath, sputum production and wheezing. Endocrine: Negative for polydipsia, polyphagia and polyuria. Skin:  Negative for color change. Musculoskeletal:  Negative for neck pain. Gastrointestinal:  Negative for abdominal pain, bowel incontinence, diarrhea, hematemesis, hematochezia, nausea and vomiting. Genitourinary:  Negative for dysuria, frequency and hematuria. Neurological:  Negative for dizziness, focal weakness, headaches, light-headedness, loss of balance, numbness, sensory change and weakness. Psychiatric/Behavioral:  Negative for altered mental status and memory loss. The patient is not nervous/anxious.           PHYSICAL EXAM:   /72   Pulse 86   Ht 5' 6\" (1.676 m)   Wt 135 lb 8 oz (61.5 kg) Comment: with shoes  BMI 21.87 kg/m²      Physical Exam  Constitutional:       Appearance: Normal appearance. HENT:      Head: Normocephalic and atraumatic. Nose: Nose normal.   Eyes:      Extraocular Movements: Extraocular movements intact. Pupils: Pupils are equal, round, and reactive to light. Neck:      Vascular: No carotid bruit. Cardiovascular:      Rate and Rhythm: Regular rhythm. Pulses: Normal pulses. Heart sounds: No murmur heard. Pulmonary:      Effort: Pulmonary effort is normal.      Breath sounds: Normal breath sounds. Abdominal:      General: Abdomen is flat. Bowel sounds are normal.      Palpations: Abdomen is soft. Musculoskeletal:         General: Normal range of motion. Cervical back: Normal range of motion and neck supple. Skin:     General: Skin is warm and dry. Neurological:      General: No focal deficit present. Mental Status: She is alert and oriented to person, place, and time. Psychiatric:         Mood and Affect: Mood normal.       Medical problems and test results were reviewed with the patient today. No results found for this or any previous visit (from the past 672 hour(s)). Lab Results   Component Value Date/Time    CHOL 154 07/18/2022 10:23 AM    HDL 50 07/18/2022 10:23 AM    VLDL 15 02/14/2022 10:33 AM     Results for orders placed or performed in visit on 08/24/22   EKG 12 Lead - Clinic Performed    Impression    Sinus  Rhythm   -  Nonspecific T-abnormality. ABNORMAL        ASSESSMENT and Winnie Ours was seen today for coronary artery disease, hypertension and palpitations. Diagnoses and all orders for this visit:    Essential hypertension:BP is at [address]. -     EKG 12 Lead - Clinic Performed    Palpitations: Worse. Try switching Tiazac 180 mg q am to q pm.Call for event monitor if no improvement. -     EKG 12 Lead - Clinic Performed    ASCVD (arteriosclerotic cardiovascular disease): Angina remains stable. Continue high dose Imdur,ASA,Plavix,and Lipitor. Disposition:    Return in about 3 months (around 11/24/2022).               Dana Ronquillo MD  8/24/2022  11:22 AM

## 2022-10-10 ENCOUNTER — TELEMEDICINE (OUTPATIENT)
Dept: INTERNAL MEDICINE CLINIC | Facility: CLINIC | Age: 62
End: 2022-10-10
Payer: COMMERCIAL

## 2022-10-10 DIAGNOSIS — J44.0 CHRONIC OBSTRUCTIVE PULMONARY DISEASE WITH ACUTE LOWER RESPIRATORY INFECTION (HCC): Primary | ICD-10-CM

## 2022-10-10 DIAGNOSIS — R05.9 COUGH, UNSPECIFIED TYPE: ICD-10-CM

## 2022-10-10 DIAGNOSIS — Z99.81 DEPENDENCE ON CONTINUOUS SUPPLEMENTAL OXYGEN: ICD-10-CM

## 2022-10-10 PROCEDURE — 99212 OFFICE O/P EST SF 10 MIN: CPT | Performed by: INTERNAL MEDICINE

## 2022-10-10 RX ORDER — AMOXICILLIN AND CLAVULANATE POTASSIUM 875; 125 MG/1; MG/1
1 TABLET, FILM COATED ORAL 2 TIMES DAILY
Qty: 20 TABLET | Refills: 0 | Status: SHIPPED | OUTPATIENT
Start: 2022-10-10 | End: 2022-10-20

## 2022-10-10 RX ORDER — BENZONATATE 200 MG/1
200 CAPSULE ORAL 3 TIMES DAILY PRN
Qty: 30 CAPSULE | Refills: 0 | Status: SHIPPED | OUTPATIENT
Start: 2022-10-10 | End: 2022-10-20

## 2022-10-10 RX ORDER — PREDNISONE 10 MG/1
TABLET ORAL
Qty: 10 TABLET | Refills: 0 | Status: SHIPPED | OUTPATIENT
Start: 2022-10-10

## 2022-10-10 NOTE — PROGRESS NOTES
Ruth Wasserman is a 58 y.o. female who was seen by synchronous (real-time) audio-video technology on 10/10/2022. Subjective:   Ruth Wasserman was seen for COPD (Having cough and worsening SOB )      Has oxygen therapy at home    Cough is productive with thick greenish sputum      Chief Complaint   Patient presents with    COPD     Having cough and worsening SOB         Reviewed and updated this visit by provider:  Tobacco  Allergies  Meds  Problems  Med Hx  Surg Hx  Fam Hx         Immunizations:  Immunization status: .  Immunization History   Administered Date(s) Administered    COVID-19, PFIZER PURPLE top, DILUTE for use, (age 15 y+), 30mcg/0.3mL 07/08/2021    Influenza, FLUBLOK, (age 25 y+), PF, 0.5mL 10/22/2018    Pneumococcal Conjugate 13-valent ([id number]) 05/22/2015    Pneumococcal Polysaccharide ([id number]) 02/27/2014    Tdap (Boostrix, Adacel) 06/21/2021         Review of Systems - no high fever  Cough and SOB  Sputum production   Wheezing      Objective:     General: WN,WD, NAD   Mental  status: mental status: alert, oriented to person, place, and time, normalmood, behavior, speech, dress, motor activity, and thought processes   Resp: Deep cough , no resp distress, no audible wheezing    Neuro: nonfocal   Skin: N/A     Due to this being a TeleHealth evaluation, many elements of the physical examination are unable to be assessed. Assessment/Plan:  1. Chronic obstructive pulmonary disease with acute lower respiratory infection ([address]    2. Cough, unspecified type    3. Dependence on continuous supplemental oxygen        Will tx Tessalon perles up to 3 times a day for 10 days  Augmentin BID for 10 days  Prednisone taper    Use oxygen therapy as needed, aerosol tx and monitor O2 sat  Call if symptoms worsen        Consent:   This patient and/or their healthcare decision maker is aware that this patient-initiated Telehealth encounter is a billable service, with coverage as determined by their insurance carrier. Patient is aware that they may receive a bill and has provided verbal consent to proceed: YES    I was at [facility] office while conducting this encounter. Rafita Oseguera, was evaluated through a synchronous (real-time) audio-video encounter. The patient (or guardian if applicable) is aware that this is a billable service. Verbal consent to proceed has been obtained within the past 12 months. The visit was conducted pursuant to the emergency declaration under the [address] [place] [address], [address] [place] [address] authority and the [employer] and [employer] General Act. Patient identification was verified, and a caregiver was present when appropriate. The patient was located in a state where the provider was credentialed to provide care    15min OV for chart review, evaluation of pt via video and Medical decision making    An electronic signature was used to authenticate this note.   Andrea Montemayor MD

## 2022-10-12 RX ORDER — ATORVASTATIN CALCIUM 40 MG/1
TABLET, FILM COATED ORAL
Qty: 90 TABLET | Refills: 2 | Status: SHIPPED | OUTPATIENT
Start: 2022-10-12

## 2022-11-10 ENCOUNTER — TELEMEDICINE (OUTPATIENT)
Dept: INTERNAL MEDICINE CLINIC | Facility: CLINIC | Age: 62
End: 2022-11-10
Payer: COMMERCIAL

## 2022-11-10 DIAGNOSIS — J06.9 ACUTE URI: ICD-10-CM

## 2022-11-10 DIAGNOSIS — R05.1 ACUTE COUGH: ICD-10-CM

## 2022-11-10 DIAGNOSIS — J44.0 CHRONIC OBSTRUCTIVE PULMONARY DISEASE WITH ACUTE LOWER RESPIRATORY INFECTION (HCC): Primary | Chronic | ICD-10-CM

## 2022-11-10 DIAGNOSIS — R50.9 FEVER AND CHILLS: ICD-10-CM

## 2022-11-10 PROBLEM — Z79.4 ENCOUNTER FOR LONG-TERM (CURRENT) USE OF INSULIN (HCC): Status: ACTIVE | Noted: 2022-11-10

## 2022-11-10 PROBLEM — M79.0 RHEUMATISM: Status: ACTIVE | Noted: 2022-11-10

## 2022-11-10 PROBLEM — M25.549 HAND JOINT PAIN: Status: ACTIVE | Noted: 2022-03-29

## 2022-11-10 PROCEDURE — 99214 OFFICE O/P EST MOD 30 MIN: CPT | Performed by: NURSE PRACTITIONER

## 2022-11-10 RX ORDER — DILTIAZEM HYDROCHLORIDE 180 MG/1
CAPSULE, COATED, EXTENDED RELEASE ORAL
COMMUNITY
Start: 2022-10-11

## 2022-11-10 RX ORDER — PREDNISONE 20 MG/1
TABLET ORAL
Qty: 13 TABLET | Refills: 0 | Status: SHIPPED | OUTPATIENT
Start: 2022-11-10 | End: 2022-11-18

## 2022-11-10 RX ORDER — IBUPROFEN 200 MG
200 TABLET ORAL
COMMUNITY

## 2022-11-10 RX ORDER — AZITHROMYCIN 250 MG/1
250 TABLET, FILM COATED ORAL SEE ADMIN INSTRUCTIONS
Qty: 6 TABLET | Refills: 0 | Status: SHIPPED | OUTPATIENT
Start: 2022-11-10 | End: 2022-11-15

## 2022-11-10 ASSESSMENT — ENCOUNTER SYMPTOMS
SHORTNESS OF BREATH: 1
STRIDOR: 0
COUGH: 1
WHEEZING: 0

## 2022-11-10 NOTE — PROGRESS NOTES
Roxana Hernandez (:  1960) is a Established patient, here for evaluation of the following:    Assessment & Plan   Below is the assessment and plan developed based on review of pertinent history, physical exam, labs, studies, and medications. 1. Chronic obstructive pulmonary disease with acute lower respiratory infection ([facility]  2. Fever and chills  -     azithromycin (ZITHROMAX) 250 MG tablet; Take 1 tablet by mouth See Admin Instructions for 5 days 500mg on day 1 followed by 250mg on days 2 - 5, Disp-6 tablet, R-0Normal  3. Acute cough  -     predniSONE (DELTASONE) 20 MG tablet; 3 tabs daily x 2 days; 2 tabs daily x 2 days; 1 tab daily x 2 days; 1/2 tab daily x 2 days; then d/c, Disp-13 tablet, R-0Normal  4. Acute URI  -     azithromycin (ZITHROMAX) 250 MG tablet; Take 1 tablet by mouth See Admin Instructions for 5 days 500mg on day 1 followed by 250mg on days 2 - 5, Disp-6 tablet, R-0Normal    Zpak and predinsone taper as prescribed. Continue albuterol inhaler or DuoNeb; continue Pulmicort nebulizer. Recommended she sleep prone or on side. Deep breathing exercises and/or incentive spirometry hourly on the waking hour and every 3 hours at night. Encouraged she continue to self monitor their symptoms in home isolation. Avoid leaving home unless completely necessary. Mask at all times and maintain social distance (6 ft) in public. Rest; drink plenty of fluids. Supplement immune system with Vitamin D 2000 international units  daily, Vitamin C 500mg daily and Zinc 50mg daily. If you have trouble breathing, a fever > 100.4 without reduction with medication (Tylenol/Advil/ibuprofen), or any other concerning symptoms, please travel to urgent care and/or ER for consultation. Subjective   Roxana Hernandez (: 1960) c/o fever, cough, congestion for 24 hours. She has mild shortness of breath; is sleeping upright. She has hx/o COPD; on albuterol and Pulimcort as needed.  She has also had joint swelling in her right shoulder. Her grandchildren, ages 9 and 3, have strep throat. She is not eating/drinking much. Rapid COVID test this morning is negative. She has not had flu vaccine this season. Review of Systems   Constitutional:  Positive for chills, fatigue and fever. HENT:  Positive for congestion. Respiratory:  Positive for cough and shortness of breath. Negative for wheezing and stridor. Musculoskeletal:  Positive for arthralgias and myalgias. Objective   Patient-Reported Vitals  Patient-Reported Temperature: [address], was evaluated through a synchronous (real-time) audio-video encounter. The patient (or guardian if applicable) is aware that this is a billable service, which includes applicable co-pays. This Virtual Visit was conducted with patient's (and/or legal guardian's) consent. The visit was conducted pursuant to the emergency declaration under the [address] [place] [address], [address] [place] [address] authority and the [employer] and [employer] General Act. Patient identification was verified, and a caregiver was present when appropriate. The patient was located at Home: [address] [place] [id number].    Provider was located at [facility] ([address] [place], [place] [address]t): [address] [place] [address] 2328  Cici Newell 149, NP, APRN - CNP

## 2023-01-05 ENCOUNTER — TELEMEDICINE (OUTPATIENT)
Dept: INTERNAL MEDICINE CLINIC | Facility: CLINIC | Age: 63
End: 2023-01-05
Payer: COMMERCIAL

## 2023-01-05 DIAGNOSIS — J44.0 CHRONIC OBSTRUCTIVE PULMONARY DISEASE WITH ACUTE LOWER RESPIRATORY INFECTION (HCC): Primary | Chronic | ICD-10-CM

## 2023-01-05 PROBLEM — M25.559 PAIN IN JOINT, PELVIC REGION AND THIGH: Status: ACTIVE | Noted: 2022-03-29

## 2023-01-05 PROCEDURE — 99214 OFFICE O/P EST MOD 30 MIN: CPT | Performed by: NURSE PRACTITIONER

## 2023-01-05 RX ORDER — PREDNISONE 20 MG/1
TABLET ORAL
Qty: 13 TABLET | Refills: 0 | Status: SHIPPED | OUTPATIENT
Start: 2023-01-05

## 2023-01-05 RX ORDER — AMOXICILLIN AND CLAVULANATE POTASSIUM 875; 125 MG/1; MG/1
1 TABLET, FILM COATED ORAL 2 TIMES DAILY
Qty: 28 TABLET | Refills: 0 | Status: SHIPPED | OUTPATIENT
Start: 2023-01-05 | End: 2023-01-19

## 2023-01-05 RX ORDER — BENZONATATE 200 MG/1
200 CAPSULE ORAL 3 TIMES DAILY PRN
Qty: 21 CAPSULE | Refills: 0 | Status: SHIPPED | OUTPATIENT
Start: 2023-01-05 | End: 2023-01-12

## 2023-01-05 ASSESSMENT — ENCOUNTER SYMPTOMS
SHORTNESS OF BREATH: 1
WHEEZING: 1
SPUTUM PRODUCTION: 1
HEMOPTYSIS: 0
COUGH: 1

## 2023-01-05 ASSESSMENT — COPD QUESTIONNAIRES: COPD: 1

## 2023-01-05 NOTE — PROGRESS NOTES
Kei Carreno (:  1960) is a Established patient, here for evaluation of the following:    Assessment & Plan   Below is the assessment and plan developed based on review of pertinent history, physical exam, labs, studies, and medications. 1. Chronic obstructive pulmonary disease with acute lower respiratory infection (HCC)  -     benzonatate (TESSALON) 200 MG capsule; Take 1 capsule by mouth 3 times daily as needed for Cough, Disp-21 capsule, R-0Normal  -     amoxicillin-clavulanate (AUGMENTIN) 875-125 MG per tablet; Take 1 tablet by mouth 2 times daily for 14 days, Disp-28 tablet, R-0Normal  -     predniSONE (DELTASONE) 20 MG tablet; 3 tabs daily x 2 days; 2 tabs daily x 2 days; 1 tab daily x 2 days; 1/2 tab daily x 2 days; then d/c, Disp-13 tablet, R-0Normal    Augmentin, prednisone taper and Tessalon Perles as prescribed - directions for use and side effects discussed. Continue albuterol HFA. Recommended she sleep prone or on side. Deep breathing exercises and/or incentive spirometry hourly on the waking hour and every 3 hours at night. Rest; drink plenty of fluids. Supplement immune system with Vitamin D 2000 international units  daily, Vitamin C 500mg daily and Zinc 50mg daily. If you have trouble breathing, a fever > 100.4 without reduction with medication (Tylenol/Advil/ibuprofen), or any other concerning symptoms, please travel to urgent care and/or ER for consultation. Subjective   Kei Carreno (: 1960) c/o cough, congestion x one week. Cough is productive of colored sputum: green. She had some leftover Augmentin at home which she has been taking for 2 days (BID). She is also wheezing. She has been using albuterol, but has not been using her nebulizer as it caused increased shakes she states. She has been taking OTC \"flu and cold\" gel cap. COPD  She complains of cough, shortness of breath, sputum production and wheezing. There is no hemoptysis. This is a chronic problem. The current episode started in the past 7 days. The problem occurs constantly. The problem has been waxing and waning. The cough is productive. Her symptoms are alleviated by beta-agonist. Her past medical history is significant for COPD. Cough  Associated symptoms include shortness of breath and wheezing. Pertinent negatives include no hemoptysis. Her past medical history is significant for COPD. Review of Systems   Respiratory:  Positive for cough, sputum production, shortness of breath and wheezing. Negative for hemoptysis. Objective   Patient-Reported Vitals  No data recorded     Physical Exam  Constitutional:       General: She is not in acute distress. Appearance: Normal appearance. She is not ill-appearing. HENT:      Nose: Congestion present. Pulmonary:      Effort: Pulmonary effort is normal.   Neurological:      Mental Status: She is alert. Psychiatric:         Mood and Affect: Mood normal.         Thought Content: Thought content normal.         Judgment: Judgment normal.          Stephane Melendez, was evaluated through a synchronous (real-time) audio-video encounter. The patient (or guardian if applicable) is aware that this is a billable service, which includes applicable co-pays. This Virtual Visit was conducted with patient's (and/or legal guardian's) consent. The visit was conducted pursuant to the emergency declaration under the [facility], [address] [place] [address] authority and the [employer] and [employer] General Act. Patient identification was verified, and a caregiver was present when appropriate. The patient was located at Home: [address] [facility] [id number].    Provider was located at [facility] ([facility] Dept): [address] [place] [address] - [facility], NP, APRN - CNP

## 2023-01-16 ENCOUNTER — OFFICE VISIT (OUTPATIENT)
Dept: INTERNAL MEDICINE CLINIC | Facility: CLINIC | Age: 63
End: 2023-01-16
Payer: COMMERCIAL

## 2023-01-16 VITALS
BODY MASS INDEX: 21.21 KG/M2 | HEIGHT: 66 IN | SYSTOLIC BLOOD PRESSURE: 110 MMHG | WEIGHT: 132 LBS | DIASTOLIC BLOOD PRESSURE: 60 MMHG

## 2023-01-16 DIAGNOSIS — J44.9 CHRONIC OBSTRUCTIVE PULMONARY DISEASE, UNSPECIFIED COPD TYPE (HCC): Primary | ICD-10-CM

## 2023-01-16 DIAGNOSIS — M25.511 CHRONIC RIGHT SHOULDER PAIN: ICD-10-CM

## 2023-01-16 DIAGNOSIS — I10 ESSENTIAL HYPERTENSION: ICD-10-CM

## 2023-01-16 DIAGNOSIS — G89.29 CHRONIC RIGHT SHOULDER PAIN: ICD-10-CM

## 2023-01-16 DIAGNOSIS — E78.00 HYPERCHOLESTEREMIA: ICD-10-CM

## 2023-01-16 DIAGNOSIS — M15.9 PRIMARY OSTEOARTHRITIS INVOLVING MULTIPLE JOINTS: ICD-10-CM

## 2023-01-16 DIAGNOSIS — F41.9 ANXIETY: ICD-10-CM

## 2023-01-16 DIAGNOSIS — R35.0 URINARY FREQUENCY: ICD-10-CM

## 2023-01-16 DIAGNOSIS — E11.9 TYPE 2 DIABETES MELLITUS WITHOUT COMPLICATION, WITHOUT LONG-TERM CURRENT USE OF INSULIN (HCC): ICD-10-CM

## 2023-01-16 DIAGNOSIS — M51.36 DEGENERATION OF LUMBAR INTERVERTEBRAL DISC: ICD-10-CM

## 2023-01-16 DIAGNOSIS — K21.00 GASTROESOPHAGEAL REFLUX DISEASE WITH ESOPHAGITIS, UNSPECIFIED WHETHER HEMORRHAGE: ICD-10-CM

## 2023-01-16 DIAGNOSIS — Z79.899 ON STATIN THERAPY: ICD-10-CM

## 2023-01-16 PROBLEM — J96.11 CHRONIC RESPIRATORY FAILURE WITH HYPOXIA (HCC): Status: RESOLVED | Noted: 2020-03-21 | Resolved: 2023-01-16

## 2023-01-16 PROBLEM — S91.331A PUNCTURE WOUND OF RIGHT FOOT: Status: RESOLVED | Noted: 2021-06-21 | Resolved: 2023-01-16

## 2023-01-16 PROBLEM — R07.9 CHEST PAIN: Status: RESOLVED | Noted: 2021-08-11 | Resolved: 2023-01-16

## 2023-01-16 PROBLEM — Z79.4 ENCOUNTER FOR LONG-TERM (CURRENT) USE OF INSULIN (HCC): Status: RESOLVED | Noted: 2022-11-10 | Resolved: 2023-01-16

## 2023-01-16 LAB
BILIRUBIN, URINE, POC: ABNORMAL
BLOOD URINE, POC: NEGATIVE
GLUCOSE URINE, POC: NEGATIVE
KETONES, URINE, POC: ABNORMAL
LEUKOCYTE ESTERASE, URINE, POC: NEGATIVE
NITRITE, URINE, POC: NEGATIVE
PH, URINE, POC: 6 (ref 4.6–8)
PROTEIN,URINE, POC: 300
SPECIFIC GRAVITY, URINE, POC: 1.03 (ref 1–1.03)
URINALYSIS CLARITY, POC: ABNORMAL
URINALYSIS COLOR, POC: YELLOW
UROBILINOGEN, POC: 1

## 2023-01-16 PROCEDURE — 99214 OFFICE O/P EST MOD 30 MIN: CPT | Performed by: INTERNAL MEDICINE

## 2023-01-16 PROCEDURE — 3074F SYST BP LT 130 MM HG: CPT | Performed by: INTERNAL MEDICINE

## 2023-01-16 PROCEDURE — 3078F DIAST BP <80 MM HG: CPT | Performed by: INTERNAL MEDICINE

## 2023-01-16 PROCEDURE — 81003 URINALYSIS AUTO W/O SCOPE: CPT | Performed by: INTERNAL MEDICINE

## 2023-01-16 RX ORDER — TRAMADOL HYDROCHLORIDE 50 MG/1
50 TABLET ORAL EVERY 8 HOURS PRN
Qty: 90 TABLET | Refills: 3 | Status: SHIPPED | OUTPATIENT
Start: 2023-01-16 | End: 2023-05-16

## 2023-01-16 RX ORDER — PANTOPRAZOLE SODIUM 40 MG/1
40 TABLET, DELAYED RELEASE ORAL NIGHTLY
Qty: 90 TABLET | Refills: 1 | Status: SHIPPED | OUTPATIENT
Start: 2023-01-16

## 2023-01-16 RX ORDER — TRAMADOL HYDROCHLORIDE 50 MG/1
50 TABLET ORAL EVERY 8 HOURS PRN
COMMUNITY
End: 2023-01-16 | Stop reason: SDUPTHER

## 2023-01-16 ASSESSMENT — PATIENT HEALTH QUESTIONNAIRE - PHQ9
SUM OF ALL RESPONSES TO PHQ QUESTIONS 1-9: 0
SUM OF ALL RESPONSES TO PHQ9 QUESTIONS 1 & 2: 0
2. FEELING DOWN, DEPRESSED OR HOPELESS: 0
SUM OF ALL RESPONSES TO PHQ QUESTIONS 1-9: 0
1. LITTLE INTEREST OR PLEASURE IN DOING THINGS: 0

## 2023-01-16 ASSESSMENT — ENCOUNTER SYMPTOMS
SHORTNESS OF BREATH: 1
COUGH: 1
BACK PAIN: 1
VOMITING: 1
NAUSEA: 1

## 2023-01-16 NOTE — PROGRESS NOTES
HPI: rDew Higuera (: 1960)    Having reflux and nausea  Jeanna gets N/V after coughing spells    Has been having pain in her right shoulder for 6mos and has been taking up to 7 IBU daily as well as ASA  Also on Plavix    Having urinary symptoms as well- on Azo    Need to update labs    Feeling dizzy at times    Problem List:  Patient Active Problem List   Diagnosis    Dependence on continuous supplemental oxygen    Polycythemia    Tracheobronchitis    Memory changes    Vitamin D deficiency    Mixed simple and mucopurulent chronic bronchitis (HCC)    Palpitations    Cystitis    Diabetes ([address]    Irritable bowel syndrome    Chronic pain syndrome    Snoring    Chronic cough    Fibromyalgia    Essential hypertension    Tobacco use    Depression    Anxiety    Nicotine dependence    Angina pectoris (HCC)    ASCVD (arteriosclerotic cardiovascular disease)    COPD (chronic obstructive pulmonary disease) ([facility])    Bursitis    Liver disease    GERD (gastroesophageal reflux disease)    Mixed hyperlipidemia    Lung nodules    CVA (cerebral vascular accident) ([address]    Osteoarthritis    Degeneration of lumbar intervertebral disc    Insomnia    Pain in joint, pelvic region and thigh    Signs and symptoms involving cognition    Rheumatism    On statin therapy       History:  Past Medical History:   Diagnosis Date    Acute renal failure ([address] 2013    Baseline creatinine was 0.8, and currently it is 2.9     Acute respiratory failure ([address] 2013    Adverse effect of anesthesia     difficulty waking up    Arthritis     Back pain     CAD (coronary artery disease)     CAP (community acquired pneumonia) 2013    COPD exacerbation ([address] 2017    Oxygen at 3lpm continuously    CVA (cerebral vascular accident) ([address] 2020    left MCA with right sided weakness- S/P TPA    Cystitis     Diabetes ([address]     Difficult intubation     with spine surgery at [facility]    Hyperlipidemia     on statin    Hypertension Hypoproteinemia ([facility] 12/11/2013    Hypotension, unspecified 12/5/2013    Hypoxemia 12/20/2013    follow up overnight oximetry on room air 3/2014 - resolved. Ill-defined condition     hx of IC     Migraine headache     Myalgia     Pleural effusion 12/17/2013    Left: 450 ml of fluid was obtained       Positive occult stool blood test 12/9/2013    Psychiatric disorder     Respiratory failure ([facility] 12/2013    required intubation    Sepsis ([facility] 12/7/2013    Septicemia ([facility] Dec 2013    no BP, pneumonia, \"Everything was failing\"- on vent 12/6-12/17    Unspecified adverse effect of anesthesia     took a long time to wake up 2005    Upper GI bleed 12/10/2013    GI evaluated        Allergies: Allergies   Allergen Reactions    Latex Other (See Comments)    Moxifloxacin Other (See Comments), Swelling and Shortness Of Breath    Umeclidinium-Vilanterol Other (See Comments)     Chest pain    Egg White (Egg Protein) Itching     Itching with some tongue swelling and nausea    Banana Swelling and Other (See Comments)    Citrullus Vulgaris Swelling    Diazepam Other (See Comments)     angry    Pseudoephedrine Other (See Comments)    Pseudoephedrine Hcl Palpitations       Current Medications:  Current Outpatient Medications   Medication Sig Dispense Refill    traMADol (ULTRAM) 50 MG tablet Take 1 tablet by mouth every 8 hours as needed for Pain for up to 120 days. Max Daily Amount: 150 mg 90 tablet 3    pantoprazole (PROTONIX) 40 MG tablet Take 1 tablet by mouth at bedtime 90 tablet 1    dilTIAZem (CARDIZEM CD) 180 MG extended release capsule TAKE 1 CAPSULE BY MOUTH EVERY DAY      ibuprofen (ADVIL;MOTRIN) 200 MG tablet Take 200 mg by mouth      atorvastatin (LIPITOR) 40 MG tablet TAKE 1 TABLET BY MOUTH EVERY DAY 90 tablet 2    metFORMIN (GLUCOPHAGE) 500 MG tablet TAKE TABLET BY MOUTH DAILY WITH BREAKFAST 90 tablet 2    clopidogrel (PLAVIX) 75 MG tablet TAKE 1 TABLET BY MOUTH DAILY.  INDICATIONS: PREVENTION FOR A BLOOD CLOT GOING TO THE BRAIN 90 tablet 2    albuterol sulfate  (90 Base) MCG/ACT inhaler 2 puffs 4 times daily prn      aspirin 81 MG EC tablet Take by mouth daily      budesonide (PULMICORT) 0.5 MG/2ML nebulizer suspension Inhale 500 mcg into the lungs 2 times daily      Cholecalciferol 50 MCG (2000 UT) TABS Take by mouth daily      citalopram (CELEXA) 40 MG tablet TAKE 1 TABLET BY MOUTH EVERY DAY      dilTIAZem (TIAZAC) 180 MG extended release capsule TAKE 1 CAPSULE BY MOUTH EVERY DAY      ipratropium-albuterol (DUONEB) 0.5-2.5 (3) MG/3ML SOLN nebulizer solution 1 vial via nebulizer qid. Indications: bronchi muscle spasm resulting from COPD      isosorbide mononitrate (IMDUR) 120 MG extended release tablet 1 tablet daily      meclizine (ANTIVERT) 25 MG tablet Take 25 mg by mouth 3 times daily as needed      nitroGLYCERIN (NITROSTAT) 0.4 MG SL tablet Place 1 sl under the tongue q 5 min prn cp, max 3 sl in a 15-min time period. Call 911 if no relief after the 3rd sl. No current facility-administered medications for this visit. Review of Systems:  Review of Systems   Constitutional:  Negative for unexpected weight change. Respiratory:  Positive for cough and shortness of breath. Gastrointestinal:  Positive for nausea and vomiting. Gerd   Genitourinary:  Positive for dysuria. Musculoskeletal:  Positive for arthralgias and back pain. Neurological:  Positive for dizziness. All other systems reviewed and are negative. Vitals:  /60   Ht 5' 6\" (1.676 m)   Wt 132 lb (59.9 kg)   BMI 21.31 kg/m²     Physical Exam:  Physical Exam  Vitals reviewed. Constitutional:       Appearance: Normal appearance. She is normal weight. HENT:      Head: Normocephalic and atraumatic. Eyes:      Extraocular Movements: Extraocular movements intact. Pupils: Pupils are equal, round, and reactive to light. Cardiovascular:      Rate and Rhythm: Normal rate and regular rhythm.       Heart sounds: Normal heart sounds. Pulmonary:      Effort: Pulmonary effort is normal.      Comments: Diminished BS bilateral with no wheezing  Musculoskeletal:         General: Tenderness present. Cervical back: Normal range of motion and neck supple. Comments: Right shoulder with decrease ROM and tenderness post deltoid and AC   Skin:     General: Skin is warm and dry. Neurological:      General: No focal deficit present. Mental Status: She is alert and oriented to person, place, and time. Psychiatric:         Mood and Affect: Mood normal.         Behavior: Behavior normal.         Thought Content: Thought content normal.         Judgment: Judgment normal.        Assessment/Plan:   Cony Medrano was seen today for follow-up. Diagnoses and all orders for this visit:    Chronic obstructive pulmonary disease, unspecified COPD type ([facility]    Essential hypertension  -     CBC with Auto Differential; Future  -     Comprehensive Metabolic Panel; Future    Degeneration of lumbar intervertebral disc  -     traMADol (ULTRAM) 50 MG tablet; Take 1 tablet by mouth every 8 hours as needed for Pain for up to 120 days. Max Daily Amount: 150 mg    Urinary frequency  -     AMB POC URINALYSIS DIP STICK AUTO W/O MICRO  Urine negative for infection  Type 2 diabetes mellitus without complication, without long-term current use of insulin ([facility])  -     Hemoglobin A1C; Future    Hypercholesteremia  -     Lipid Panel; Future    On statin therapy    Anxiety    Primary osteoarthritis involving multiple joints    Chronic right shoulder pain  -     [address] [place]     Gastroesophageal reflux disease with esophagitis, unspecified whether hemorrhage    Other orders  -     pantoprazole (PROTONIX) 40 MG tablet;  Take 1 tablet by mouth at bedtime    Will start PPI for GI complaints and also on Plavix and ASA  Needs to take only Tylenol for pain      Will refer to Ortho for eval of her shoulder impingement    Need to update her labs bhavesh with c/o dizziness and make sure not anemic    Has CPS and has been on pain meds for yrs for myalgias and back pain and DJD of multiple joints      Current medications are therapeutic at this time; continue as prescribed.         Branda Homans, MD

## 2023-01-17 LAB
ALBUMIN SERPL-MCNC: 3.7 G/DL (ref 3.2–4.6)
ALBUMIN/GLOB SERPL: 1 (ref 0.4–1.6)
ALP SERPL-CCNC: 90 U/L (ref 50–136)
ALT SERPL-CCNC: 42 U/L (ref 12–65)
ANION GAP SERPL CALC-SCNC: 12 MMOL/L (ref 2–11)
AST SERPL-CCNC: 24 U/L (ref 15–37)
BASOPHILS # BLD: 0.1 K/UL (ref 0–0.2)
BASOPHILS NFR BLD: 1 % (ref 0–2)
BILIRUB SERPL-MCNC: 0.6 MG/DL (ref 0.2–1.1)
BUN SERPL-MCNC: 15 MG/DL (ref 8–23)
CALCIUM SERPL-MCNC: 9.6 MG/DL (ref 8.3–10.4)
CHLORIDE SERPL-SCNC: 102 MMOL/L (ref 101–110)
CHOLEST SERPL-MCNC: 157 MG/DL
CO2 SERPL-SCNC: 26 MMOL/L (ref 21–32)
CREAT SERPL-MCNC: 0.9 MG/DL (ref 0.6–1)
DIFFERENTIAL METHOD BLD: ABNORMAL
EOSINOPHIL # BLD: 0.2 K/UL (ref 0–0.8)
EOSINOPHIL NFR BLD: 1 % (ref 0.5–7.8)
ERYTHROCYTE [DISTWIDTH] IN BLOOD BY AUTOMATED COUNT: 15 % (ref 11.9–14.6)
EST. AVERAGE GLUCOSE BLD GHB EST-MCNC: 126 MG/DL
GLOBULIN SER CALC-MCNC: 3.8 G/DL (ref 2.8–4.5)
GLUCOSE SERPL-MCNC: 141 MG/DL (ref 65–100)
HBA1C MFR BLD: 6 % (ref 4.8–5.6)
HCT VFR BLD AUTO: 53 % (ref 35.8–46.3)
HDLC SERPL-MCNC: 69 MG/DL (ref 40–60)
HDLC SERPL: 2.3
HGB BLD-MCNC: 16.6 G/DL (ref 11.7–15.4)
IMM GRANULOCYTES # BLD AUTO: 0.1 K/UL (ref 0–0.5)
IMM GRANULOCYTES NFR BLD AUTO: 1 % (ref 0–5)
LDLC SERPL CALC-MCNC: 56.6 MG/DL
LYMPHOCYTES # BLD: 2.1 K/UL (ref 0.5–4.6)
LYMPHOCYTES NFR BLD: 10 % (ref 13–44)
MCH RBC QN AUTO: 30 PG (ref 26.1–32.9)
MCHC RBC AUTO-ENTMCNC: 31.3 G/DL (ref 31.4–35)
MCV RBC AUTO: 95.8 FL (ref 82–102)
MONOCYTES # BLD: 1.3 K/UL (ref 0.1–1.3)
MONOCYTES NFR BLD: 6 % (ref 4–12)
NEUTS SEG # BLD: 16.5 K/UL (ref 1.7–8.2)
NEUTS SEG NFR BLD: 82 % (ref 43–78)
NRBC # BLD: 0 K/UL (ref 0–0.2)
PLATELET # BLD AUTO: 454 K/UL (ref 150–450)
PMV BLD AUTO: 9 FL (ref 9.4–12.3)
POTASSIUM SERPL-SCNC: 4.1 MMOL/L (ref 3.5–5.1)
PROT SERPL-MCNC: 7.5 G/DL (ref 6.3–8.2)
RBC # BLD AUTO: 5.53 M/UL (ref 4.05–5.2)
SODIUM SERPL-SCNC: 140 MMOL/L (ref 133–143)
TRIGL SERPL-MCNC: 157 MG/DL (ref 35–150)
VLDLC SERPL CALC-MCNC: 31.4 MG/DL (ref 6–23)
WBC # BLD AUTO: 20.3 K/UL (ref 4.3–11.1)

## 2023-01-18 DIAGNOSIS — R05.3 CHRONIC COUGH: Primary | ICD-10-CM

## 2023-01-19 ENCOUNTER — TELEPHONE (OUTPATIENT)
Dept: CARDIOLOGY CLINIC | Age: 63
End: 2023-01-19

## 2023-01-19 ENCOUNTER — OFFICE VISIT (OUTPATIENT)
Dept: ORTHOPEDIC SURGERY | Age: 63
End: 2023-01-19

## 2023-01-19 ENCOUNTER — OFFICE VISIT (OUTPATIENT)
Dept: CARDIOLOGY CLINIC | Age: 63
End: 2023-01-19
Payer: COMMERCIAL

## 2023-01-19 VITALS
DIASTOLIC BLOOD PRESSURE: 50 MMHG | BODY MASS INDEX: 21.31 KG/M2 | HEART RATE: 106 BPM | WEIGHT: 132.6 LBS | SYSTOLIC BLOOD PRESSURE: 90 MMHG | HEIGHT: 66 IN

## 2023-01-19 DIAGNOSIS — M25.511 RIGHT SHOULDER PAIN, UNSPECIFIED CHRONICITY: Primary | ICD-10-CM

## 2023-01-19 DIAGNOSIS — R00.2 PALPITATIONS: Primary | ICD-10-CM

## 2023-01-19 DIAGNOSIS — J44.0 CHRONIC OBSTRUCTIVE PULMONARY DISEASE WITH ACUTE LOWER RESPIRATORY INFECTION (HCC): ICD-10-CM

## 2023-01-19 DIAGNOSIS — I10 ESSENTIAL HYPERTENSION: ICD-10-CM

## 2023-01-19 DIAGNOSIS — I63.9 CEREBROVASCULAR ACCIDENT (CVA), UNSPECIFIED MECHANISM (HCC): ICD-10-CM

## 2023-01-19 DIAGNOSIS — I25.10 ASCVD (ARTERIOSCLEROTIC CARDIOVASCULAR DISEASE): ICD-10-CM

## 2023-01-19 PROCEDURE — 99214 OFFICE O/P EST MOD 30 MIN: CPT | Performed by: INTERNAL MEDICINE

## 2023-01-19 PROCEDURE — 3074F SYST BP LT 130 MM HG: CPT | Performed by: INTERNAL MEDICINE

## 2023-01-19 PROCEDURE — 3078F DIAST BP <80 MM HG: CPT | Performed by: INTERNAL MEDICINE

## 2023-01-19 ASSESSMENT — ENCOUNTER SYMPTOMS
DIARRHEA: 0
WHEEZING: 0
HEMATEMESIS: 0
CHEST TIGHTNESS: 0
HEMATOCHEZIA: 0
ABDOMINAL PAIN: 0
COUGH: 1
HOARSE VOICE: 0
SHORTNESS OF BREATH: 1
SPUTUM PRODUCTION: 0
BLURRED VISION: 0
COLOR CHANGE: 0
BOWEL INCONTINENCE: 0
ORTHOPNEA: 0

## 2023-01-19 NOTE — PROGRESS NOTES
[facility] CARDIOLOGY  [address] [facility], [address]  PHONE: [phone]        23        NAME:  Katie Griffith  : 1960  MRN: [id number]       SUBJECTIVE:   Katie Griffith is a 58 y.o. female seen for a follow up visit regarding the following: COPD ,CVA,CAD,and primary hypertension. She returns for follow up. She reports worsening palpitations. Chief Complaint   Patient presents with    Hypertension    3 Month Follow-Up       HPI:    Coronary Artery Disease  Presents for follow-up visit. Symptoms include palpitations and shortness of breath. Pertinent negatives include no chest pain, chest pressure, chest tightness, dizziness, leg swelling, muscle weakness or weight gain. The symptoms have been stable. Compliance with diet is good. Compliance with exercise is poor. Compliance with medications is good. Past Medical History, Past Surgical History, Family history, Social History, and Medications were all reviewed with the patient today and updated as necessary. Current Outpatient Medications:     traMADol (ULTRAM) 50 MG tablet, Take 1 tablet by mouth every 8 hours as needed for Pain for up to 120 days. Max Daily Amount: 150 mg, Disp: 90 tablet, Rfl: 3    pantoprazole (PROTONIX) 40 MG tablet, Take 1 tablet by mouth at bedtime, Disp: 90 tablet, Rfl: 1    dilTIAZem (CARDIZEM CD) 180 MG extended release capsule, TAKE 1 CAPSULE BY MOUTH EVERY DAY, Disp: , Rfl:     ibuprofen (ADVIL;MOTRIN) 200 MG tablet, Take 200 mg by mouth, Disp: , Rfl:     atorvastatin (LIPITOR) 40 MG tablet, TAKE 1 TABLET BY MOUTH EVERY DAY, Disp: 90 tablet, Rfl: 2    metFORMIN (GLUCOPHAGE) 500 MG tablet, TAKE TABLET BY MOUTH DAILY WITH BREAKFAST, Disp: 90 tablet, Rfl: 2    clopidogrel (PLAVIX) 75 MG tablet, TAKE 1 TABLET BY MOUTH DAILY.  INDICATIONS: PREVENTION FOR A BLOOD CLOT GOING TO THE BRAIN, Disp: 90 tablet, Rfl: 2    albuterol sulfate  (90 Base) MCG/ACT inhaler, 2 puffs 4 times daily prn, Disp: , Rfl:     aspirin 81 MG EC tablet, Take by mouth daily, Disp: , Rfl:     budesonide (PULMICORT) 0.5 MG/2ML nebulizer suspension, Inhale 500 mcg into the lungs 2 times daily, Disp: , Rfl:     Cholecalciferol 50 MCG (2000 UT) TABS, Take by mouth daily, Disp: , Rfl:     citalopram (CELEXA) 40 MG tablet, TAKE 1 TABLET BY MOUTH EVERY DAY, Disp: , Rfl:     ipratropium-albuterol (DUONEB) 0.5-2.5 (3) MG/3ML SOLN nebulizer solution, 1 vial via nebulizer qid. Indications: bronchi muscle spasm resulting from COPD, Disp: , Rfl:     isosorbide mononitrate (IMDUR) 120 MG extended release tablet, 1 tablet daily, Disp: , Rfl:     meclizine (ANTIVERT) 25 MG tablet, Take 25 mg by mouth 3 times daily as needed, Disp: , Rfl:     nitroGLYCERIN (NITROSTAT) 0.4 MG SL tablet, Place 1 sl under the tongue q 5 min prn cp, max 3 sl in a 15-min time period.  Call 911 if no relief after the 3rd sl., Disp: , Rfl:   Allergies   Allergen Reactions    Latex Other (See Comments)    Moxifloxacin Other (See Comments), Swelling and Shortness Of Breath    Umeclidinium-Vilanterol Other (See Comments)     Chest pain    Egg White (Egg Protein) Itching     Itching with some tongue swelling and nausea    Banana Swelling and Other (See Comments)    Citrullus Vulgaris Swelling    Diazepam Other (See Comments)     angry    Pseudoephedrine Other (See Comments)    Pseudoephedrine Hcl Palpitations     Past Medical History:   Diagnosis Date    Acute renal failure ([facility] 12/5/2013    Baseline creatinine was 0.8, and currently it is 2.9     Acute respiratory failure ([address] 12/5/2013    Adverse effect of anesthesia     difficulty waking up    Arthritis     Back pain     CAD (coronary artery disease)     CAP (community acquired pneumonia) 12/7/2013    COPD exacerbation ([address] 6/8/2017    Oxygen at 3lpm continuously    CVA (cerebral vascular accident) ([facility] 03/19/2020    left MCA with right sided weakness- S/P TPA    Cystitis     Diabetes ([facility]     Difficult intubation 2005    with spine surgery at [facility]    Hyperlipidemia     on statin    Hypertension     Hypoproteinemia ([facility] 12/11/2013    Hypotension, unspecified 12/5/2013    Hypoxemia 12/20/2013    follow up overnight oximetry on room air 3/2014 - resolved. Ill-defined condition     hx of IC     Migraine headache     Myalgia     Pleural effusion 12/17/2013    Left: 450 ml of fluid was obtained       Positive occult stool blood test 12/9/2013    Psychiatric disorder     Respiratory failure ([address] 12/2013    required intubation    Sepsis ([address] 12/7/2013    Septicemia ([address] Dec 2013    no BP, pneumonia, \"Everything was failing\"- on vent 12/6-12/17    Unspecified adverse effect of anesthesia     took a long time to wake up 2005    Upper GI bleed 12/10/2013    GI evaluated      Past Surgical History:   Procedure Laterality Date    CARDIAC CATHETERIZATION  2011    Mild CAD per Dr Shaunna García  12/29/2016    LAD:30% stenosis with \"sluggish flow\",Dx:30% stenosis. LCX OM:40-50% stenosis with - FFR,Small accesssory OM:70% ostial stenosis in 1 mmvessel,and RCA:30% diffuse CAD. CARDIAC CATHETERIZATION  07/15/2019    LV:EF55-60%. LM:nl. LAD:<30% stenosis. mLCX:50-60% stenosis. mRCA:60% stenosis with normal IFR (1.0) and irregularities    CHOLECYSTECTOMY      HYSTERECTOMY (CERVIX STATUS UNKNOWN)      hyesterectomy    NEUROLOGICAL SURGERY  2000 & 2005    spine X 4    ORTHOPEDIC SURGERY Right 10/2014    elbow    ORTHOPEDIC SURGERY      4 back surgeries     Family History   Problem Relation Age of Onset    Cancer Mother         colon    Alzheimer's Disease Mother     Heart Disease Father       Social History     Tobacco Use    Smoking status: Every Day     Packs/day: 1.00     Types: Cigarettes    Smokeless tobacco: Never    Tobacco comments:     Quit smoking: currently smoking 1/2 to 3/4  ppd   Substance Use Topics    Alcohol use:  Yes     Alcohol/week: 0.0 standard drinks       ROS:    Review of Systems Constitutional: Negative for chills, decreased appetite, diaphoresis, fever, malaise/fatigue and weight gain. HENT:  Negative for congestion, hearing loss, hoarse voice and nosebleeds. Eyes:  Negative for blurred vision. Cardiovascular:  Positive for dyspnea on exertion and palpitations. Negative for chest pain, claudication, cyanosis, irregular heartbeat, leg swelling, near-syncope, orthopnea, paroxysmal nocturnal dyspnea and syncope. Respiratory:  Positive for cough and shortness of breath. Negative for chest tightness, sputum production and wheezing. Endocrine: Negative for polydipsia, polyphagia and polyuria. Skin:  Negative for color change. Musculoskeletal:  Negative for muscle weakness. Gastrointestinal:  Negative for abdominal pain, bowel incontinence, diarrhea, hematemesis and hematochezia. Genitourinary:  Negative for dysuria, frequency and hematuria. Neurological:  Negative for dizziness, focal weakness, light-headedness, loss of balance, numbness, sensory change and weakness. Psychiatric/Behavioral:  Negative for altered mental status and memory loss. PHYSICAL EXAM:   BP (!) 90/50   Pulse (!) 106   Ht 5' 6\" (1.676 m)   Wt 132 lb 9.6 oz (60.1 kg)   BMI 21.40 kg/m²      Physical Exam  Constitutional:       Appearance: Normal appearance. HENT:      Head: Normocephalic and atraumatic. Nose: Nose normal.   Eyes:      Extraocular Movements: Extraocular movements intact. Pupils: Pupils are equal, round, and reactive to light. Neck:      Vascular: No carotid bruit. Cardiovascular:      Rate and Rhythm: Regular rhythm. Pulses: Normal pulses. Heart sounds: No murmur heard. Pulmonary:      Effort: Pulmonary effort is normal.      Breath sounds: Normal breath sounds. Abdominal:      General: Abdomen is flat. Bowel sounds are normal.      Palpations: Abdomen is soft. Musculoskeletal:         General: Normal range of motion.       Cervical back: Normal range of motion and neck supple. Skin:     General: Skin is warm and dry. Neurological:      General: No focal deficit present. Mental Status: She is alert and oriented to person, place, and time. Psychiatric:         Mood and Affect: Mood normal.       Medical problems and test results were reviewed with the patient today.      Recent Results (from the past 672 hour(s))   AMB POC URINALYSIS DIP STICK AUTO W/O MICRO    Collection Time: 01/16/23 10:00 AM   Result Value Ref Range    Color, Urine, POC Yellow     Clarity, Urine, POC Cloudy     Glucose, Urine, POC Negative Negative    Bilirubin, Urine, POC Moderate Negative    Ketones, Urine, POC 1+ Negative    Specific Gravity, Urine, POC 1.030 1.001 - 1.035    Blood, Urine, POC Negative Negative    pH, Urine, POC 6.0 4.6 - 8.0    Protein, Urine,  Negative    Urobilinogen, POC 1     Nitrate, Urine, POC Negative Negative    Leukocyte Esterase, Urine, POC Negative Negative   Hemoglobin A1C    Collection Time: 01/16/23 10:12 AM   Result Value Ref Range    Hemoglobin A1C 6.0 (H) 4.8 - 5.6 %    eAG 126 mg/dL   Lipid Panel    Collection Time: 01/16/23 10:12 AM   Result Value Ref Range    Cholesterol, Total 157 <200 MG/DL    Triglycerides 157 (H) 35 - 150 MG/DL    HDL 69 (H) 40 - 60 MG/DL    LDL Calculated 56.6 <100 MG/DL    VLDL Cholesterol Calculated 31.4 (H) 6.0 - 23.0 MG/DL    Chol/HDL Ratio 2.3     Comprehensive Metabolic Panel    Collection Time: 01/16/23 10:12 AM   Result Value Ref Range    Sodium 140 133 - 143 mmol/L    Potassium 4.1 3.5 - 5.1 mmol/L    Chloride 102 101 - 110 mmol/L    CO2 26 21 - 32 mmol/L    Anion Gap 12 (H) 2 - 11 mmol/L    Glucose 141 (H) 65 - 100 mg/dL    BUN 15 8 - 23 MG/DL    Creatinine 0.90 0.6 - 1.0 MG/DL    Est, Glom Filt Rate >60 >60 ml/min/1.73m2    Calcium 9.6 8.3 - 10.4 MG/DL    Total Bilirubin 0.6 0.2 - 1.1 MG/DL    ALT 42 12 - 65 U/L    AST 24 15 - 37 U/L    Alk Phosphatase 90 50 - 136 U/L    Total Protein 7.5 6.3 - 8.2 g/dL    Albumin 3.7 3.2 - 4.6 g/dL    Globulin 3.8 2.8 - 4.5 g/dL    Albumin/Globulin Ratio 1.0 0.4 - 1.6     CBC with Auto Differential    Collection Time: 01/16/23 10:12 AM   Result Value Ref Range    WBC 20.3 (H) 4.3 - 11.1 K/uL    RBC 5.53 (H) 4.05 - 5.2 M/uL    Hemoglobin 16.6 (H) 11.7 - 15.4 g/dL    Hematocrit 53.0 (H) 35.8 - 46.3 %    MCV 95.8 82 - 102 FL    MCH 30.0 26.1 - 32.9 PG    MCHC 31.3 (L) 31.4 - 35.0 g/dL    RDW 15.0 (H) 11.9 - 14.6 %    Platelets 786 (H) 627 - 450 K/uL    MPV 9.0 (L) 9.4 - 12.3 FL    nRBC 0.00 0.0 - 0.2 K/uL    Differential Type AUTOMATED      Seg Neutrophils 82 (H) 43 - 78 %    Lymphocytes 10 (L) 13 - 44 %    Monocytes 6 4.0 - 12.0 %    Eosinophils % 1 0.5 - 7.8 %    Basophils 1 0.0 - 2.0 %    Immature Granulocytes 1 0.0 - 5.0 %    Segs Absolute 16.5 (H) 1.7 - 8.2 K/UL    Absolute Lymph # 2.1 0.5 - 4.6 K/UL    Absolute Mono # 1.3 0.1 - 1.3 K/UL    Absolute Eos # 0.2 0.0 - 0.8 K/UL    Basophils Absolute 0.1 0.0 - 0.2 K/UL    Absolute Immature Granulocyte 0.1 0.0 - 0.5 K/UL     Lab Results   Component Value Date/Time    CHOL 157 01/16/2023 10:12 AM    HDL 69 01/16/2023 10:12 AM    VLDL 15 02/14/2022 10:33 AM     No results found for any visits on 01/19/23. ASSESSMENT and Azul Rodriguez was seen today for hypertension and 3 month follow-up. Diagnoses and all orders for this visit:    Palpitations: Worse. Check a 2 week holter monitor. Continue Cardizem. -     Extended cardiac holter monitor (48 hrs - 15 days); Future    Essential hypertension:Stable. Continue Cardizem    Cerebrovascular accident (CVA), unspecified mechanism (HCC):Continue DAPT and statin. ASCVD (arteriosclerotic cardiovascular disease): Stable angina. Continue ASA,Imdur,Lipitor. Chronic obstructive pulmonary disease with acute lower respiratory infection (HCC):per PCP. Disposition:    Return in about 4 weeks (around 2/16/2023) for Follow up after procedure.                 [address] [place] [address], MD  1/19/2023  12:28 PM

## 2023-01-19 NOTE — TELEPHONE ENCOUNTER
On her paperwork it says stop Diltiazem and he didn't tell her that so she wants to make sure that is correct.  Please call

## 2023-01-19 NOTE — TELEPHONE ENCOUNTER
Pt saw Dr. Anshul Gordillo today, 1/19. Per note with Dr. Claudetta Harrier".  Called pt and informed her that she should continue taking diltiazem

## 2023-01-20 ENCOUNTER — HOSPITAL ENCOUNTER (OUTPATIENT)
Dept: GENERAL RADIOLOGY | Age: 63
Discharge: HOME | End: 2023-01-20
Payer: COMMERCIAL

## 2023-01-20 DIAGNOSIS — R05.3 CHRONIC COUGH: ICD-10-CM

## 2023-01-20 PROCEDURE — 71046 X-RAY EXAM CHEST 2 VIEWS: CPT

## 2023-01-20 RX ORDER — LEVOFLOXACIN 750 MG/1
750 TABLET ORAL DAILY
Qty: 5 TABLET | Refills: 0 | Status: SHIPPED | OUTPATIENT
Start: 2023-01-20 | End: 2023-01-25

## 2023-01-21 NOTE — PROGRESS NOTES
Name: Bre Stewart  YOB: 1960  Gender: female  MRN: [id number]    CC:   Chief Complaint   Patient presents with    Shoulder Pain     Right shoulder   , Right shoulder(s) pain, stiffness    HPI:  This patient presents with a 6 month history of Right shoulder pain and limited motion. Patient denies specific mechanism of injury. The patient notes deep and lateral pain that is worse when trying to going behind the back. The patient denies popping, clicking, popping and clicking. The patient notes interference with sleep. Patient has taken prednisone for recent bronchitis and COPD. Patient is also getting worked up for further respiratory infection. The patient is also dealing with newly undiagnosed cardiac issues. She was given a monitor today.      Allergies   Allergen Reactions    Latex Other (See Comments)    Moxifloxacin Other (See Comments), Swelling and Shortness Of Breath    Umeclidinium-Vilanterol Other (See Comments)     Chest pain    Egg White (Egg Protein) Itching     Itching with some tongue swelling and nausea    Banana Swelling and Other (See Comments)    Citrullus Vulgaris Swelling    Diazepam Other (See Comments)     angry    Pseudoephedrine Other (See Comments)    Pseudoephedrine Hcl Palpitations     Past Medical History:   Diagnosis Date    Acute renal failure ([address] 12/5/2013    Baseline creatinine was 0.8, and currently it is 2.9     Acute respiratory failure ([address] 12/5/2013    Adverse effect of anesthesia     difficulty waking up    Arthritis     Back pain     CAD (coronary artery disease)     CAP (community acquired pneumonia) 12/7/2013    COPD exacerbation ([address] 6/8/2017    Oxygen at 3lpm continuously    CVA (cerebral vascular accident) ([address] 03/19/2020    left MCA with right sided weakness- S/P TPA    Cystitis     Diabetes ([address]     Difficult intubation 2005    with spine surgery at [facility]    Hyperlipidemia     on statin    Hypertension     Hypoproteinemia ([address] 12/11/2013 Hypotension, unspecified 12/5/2013    Hypoxemia 12/20/2013    follow up overnight oximetry on room air 3/2014 - resolved. Ill-defined condition     hx of IC     Migraine headache     Myalgia     Pleural effusion 12/17/2013    Left: 450 ml of fluid was obtained       Positive occult stool blood test 12/9/2013    Psychiatric disorder     Res[facility] [address] 12/2013    required intubation    Sepsis ([address] 12/7/2013    Septicemia ([facility] Dec 2013    no BP, pneumonia, \"Everything was failing\"- on vent 12/6-12/17    Unspecified adverse effect of anesthesia     took a long time to wake up 2005    Upper GI bleed 12/10/2013    GI evaluated      Past Surgical History:   Procedure Laterality Date    CARDIAC CATHETERIZATION  2011    Mild CAD per Dr Carlie Vaz  12/29/2016    LAD:30% stenosis with \"sluggish flow\",Dx:30% stenosis. LCX OM:40-50% stenosis with - FFR,Small accesssory OM:70% ostial stenosis in 1 mmvessel,and RCA:30% diffuse CAD. CARDIAC CATHETERIZATION  07/15/2019    LV:EF55-60%. LM:nl. LAD:<30% stenosis. mLCX:50-60% stenosis. mRCA:60% stenosis with normal IFR (1.0) and irregularities    CHOLECYSTECTOMY      HYSTERECTOMY (CERVIX STATUS UNKNOWN)      hyesterectomy    NEUROLOGICAL SURGERY  2000 & 2005    spine X 4    ORTHOPEDIC SURGERY Right 10/2014    elbow    ORTHOPEDIC SURGERY      4 back surgeries     Family History   Problem Relation Age of Onset    Cancer Mother         colon    Alzheimer's Disease Mother     Heart Disease Father      Social History     Socioeconomic History    Marital status:      Spouse name: Not on file    Number of children: Not on file    Years of education: Not on file    Highest education level: Not on file   Occupational History    Not on file   Tobacco Use    Smoking status: Every Day     Packs/day: 1.00     Types: Cigarettes    Smokeless tobacco: Never    Tobacco comments:     Quit smoking: currently smoking 1/2 to 3/4  ppd   Substance and Sexual Activity    Alcohol use: Yes     Alcohol/week: 0.0 standard drinks    Drug use: No    Sexual activity: Not on file   Other Topics Concern    Not on file   Social History Narrative    Pt is  and lives with her . She on disability. Social Determinants of Health     Financial Resource Strain: Not on file   Food Insecurity: Not on file   Transportation Needs: Not on file   Physical Activity: Not on file   Stress: Not on file   Social Connections: Not on file   Intimate Partner Violence: Not on file   Housing Stability: Not on file        No flowsheet data found. Review of Systems  Also as noted on the patient medical history form on the chart and are reviewed today. Pertinent positives and negatives are addressed with the patient, particularly those related to musculoskeletal concerns. Non-orthopaedic concerns were referred back to the primary care physician. PE:    GEN: General appearance is that of a healthy patient, alert and oriented, in no distress. WDWN. HEENT:  Normocephalic, atraumatic. Extraocular muscles are intact. Neck:  Supple, no lymphadenopathy. Heart:  Regular pulse exam on all extremities. Good color and warmth noted. Lungs:  Normal non-labored breathing with no obvious shortness of breath. Abdomen:  WNL's. Skin:  No signs of rash or bruising. Pysch: Answers questions appropriately, AO X 3. MSK:  Cervical spine: No tenderness. Good active motion.  Negative bilateral Spurling's maneuver     SHOULDER   Right(involved)  Left   Skin Intact Intact   Radial Pulses 2+ symmetrical  2+ symmetrical   Myotomes Normal Normal   Dermatomes  Normal Normal   ROM , abd 90, ER 25  Full   Strength Appropriate No weakness   Atrophy None noted None noted   Effusion/Swelling  none None   Palpation Mild anterior and AC joint No Tenderness   Bicep Tendon Rupture  Negative Negative   Bear Hug, Belly Press Not tested Not tested   Crossed Arm Adduction Test Not tested Not tested Instability/Ant. Apprehension Test Not tested None   Impingement Limited Negative          X-rays:   AP, Grashey, outlet and axillary views of the Right shoulder were obtained today and reviewed in the office. No evidence of arthritis, fracture, dislocation, loose body or other abnormality. Evidence of monitor    X-ray Impression: Right shoulder appears normal.     Assessment:     ICD-10-CM    1. Right shoulder pain, unspecified chronicity  M25.511 XR SHOULDER RIGHT (MIN 2 VIEWS)          Plan:  I had a long discussion with the patient regarding the natural history of the problem, as well as treatment options. Discussed with the patient adhesive capsulitis. Discussed with the patient AC pain and biceps tendinitis. I discussed and reviewed stretching exercises. Unfortunately, she is acutely ill and currently being worked up for cardiac issues. Because of this, there is increased risk with proceeding with CSI. The patient has expressed understanding. We decided not to proceed with procedure. She will do exercises and stretches at home as she is completing her other workup. Treatment:     Handout on frozen shoulder was provided as well as home exercise program for stretching. This was also attached to the wrap-up section of my chart. Recommend therapy to evaluate and treat current complaints and pathology. A home exercise program was also discussed with the patient. We reviewed stretching protocols again in the office. He has been stretching for 10 seconds at 1 walk-in stretch for more than 20 to 30 seconds stretches. Continue with therapy 3 times a week. Also discussed continued stretching on his own at home in regards to his hand function. Reviewed tabletop stick drills, anterior drills, table slides. Follow up after cleared by PCP    No follow-ups on file.        [place], [id number]  01/21/23

## 2023-01-23 RX ORDER — CEFUROXIME AXETIL 250 MG/1
250 TABLET ORAL 2 TIMES DAILY
Qty: 14 TABLET | Refills: 0 | Status: SHIPPED | OUTPATIENT
Start: 2023-01-23 | End: 2023-01-30

## 2023-02-07 ENCOUNTER — TELEMEDICINE (OUTPATIENT)
Dept: INTERNAL MEDICINE CLINIC | Facility: CLINIC | Age: 63
End: 2023-02-07
Payer: COMMERCIAL

## 2023-02-07 ENCOUNTER — HOSPITAL ENCOUNTER (OUTPATIENT)
Dept: CT IMAGING | Age: 63
Discharge: HOME | End: 2023-02-09
Payer: COMMERCIAL

## 2023-02-07 DIAGNOSIS — R63.4 WEIGHT LOSS: ICD-10-CM

## 2023-02-07 DIAGNOSIS — R61 NIGHT SWEATS: ICD-10-CM

## 2023-02-07 DIAGNOSIS — J44.1 CHRONIC OBSTRUCTIVE PULMONARY DISEASE WITH ACUTE EXACERBATION (HCC): Chronic | ICD-10-CM

## 2023-02-07 DIAGNOSIS — I10 ESSENTIAL HYPERTENSION: ICD-10-CM

## 2023-02-07 DIAGNOSIS — J44.1 CHRONIC OBSTRUCTIVE PULMONARY DISEASE WITH ACUTE EXACERBATION (HCC): Primary | Chronic | ICD-10-CM

## 2023-02-07 DIAGNOSIS — R63.0 POOR APPETITE: ICD-10-CM

## 2023-02-07 DIAGNOSIS — R91.1 LUNG NODULE: Chronic | ICD-10-CM

## 2023-02-07 PROCEDURE — 71250 CT THORAX DX C-: CPT

## 2023-02-07 PROCEDURE — 99214 OFFICE O/P EST MOD 30 MIN: CPT | Performed by: NURSE PRACTITIONER

## 2023-02-07 ASSESSMENT — ENCOUNTER SYMPTOMS
SPUTUM PRODUCTION: 1
HOARSE VOICE: 1
CHEST TIGHTNESS: 1
FREQUENT THROAT CLEARING: 1
COUGH: 1
HEARTBURN: 1
SHORTNESS OF BREATH: 1
WHEEZING: 1
HEMOPTYSIS: 0
DIFFICULTY BREATHING: 1

## 2023-02-07 ASSESSMENT — COPD QUESTIONNAIRES: COPD: 1

## 2023-02-08 ENCOUNTER — TELEPHONE (OUTPATIENT)
Dept: PULMONOLOGY | Age: 63
End: 2023-02-08

## 2023-02-08 DIAGNOSIS — R11.2 NAUSEA AND VOMITING, UNSPECIFIED VOMITING TYPE: ICD-10-CM

## 2023-02-08 DIAGNOSIS — K21.00 GASTROESOPHAGEAL REFLUX DISEASE WITH ESOPHAGITIS, UNSPECIFIED WHETHER HEMORRHAGE: ICD-10-CM

## 2023-02-08 DIAGNOSIS — R05.9 COUGH, UNSPECIFIED TYPE: Primary | ICD-10-CM

## 2023-02-08 LAB
ALBUMIN SERPL-MCNC: 2.7 G/DL (ref 3.2–4.6)
ALBUMIN/GLOB SERPL: 0.6 (ref 0.4–1.6)
ALP SERPL-CCNC: 78 U/L (ref 50–136)
ALT SERPL-CCNC: 16 U/L (ref 12–65)
ANION GAP SERPL CALC-SCNC: 5 MMOL/L (ref 2–11)
AST SERPL-CCNC: 20 U/L (ref 15–37)
BASOPHILS # BLD: 0.1 K/UL (ref 0–0.2)
BASOPHILS NFR BLD: 1 % (ref 0–2)
BILIRUB SERPL-MCNC: 0.4 MG/DL (ref 0.2–1.1)
BUN SERPL-MCNC: 12 MG/DL (ref 8–23)
CALCIUM SERPL-MCNC: 9.8 MG/DL (ref 8.3–10.4)
CHLORIDE SERPL-SCNC: 107 MMOL/L (ref 101–110)
CO2 SERPL-SCNC: 26 MMOL/L (ref 21–32)
CREAT SERPL-MCNC: 0.7 MG/DL (ref 0.6–1)
DIFFERENTIAL METHOD BLD: ABNORMAL
EOSINOPHIL # BLD: 0.1 K/UL (ref 0–0.8)
EOSINOPHIL NFR BLD: 1 % (ref 0.5–7.8)
ERYTHROCYTE [DISTWIDTH] IN BLOOD BY AUTOMATED COUNT: 14.7 % (ref 11.9–14.6)
GLOBULIN SER CALC-MCNC: 4.5 G/DL (ref 2.8–4.5)
GLUCOSE SERPL-MCNC: 111 MG/DL (ref 65–100)
HCT VFR BLD AUTO: 42.5 % (ref 35.8–46.3)
HGB BLD-MCNC: 13.5 G/DL (ref 11.7–15.4)
IMM GRANULOCYTES # BLD AUTO: 0.1 K/UL (ref 0–0.5)
IMM GRANULOCYTES NFR BLD AUTO: 1 % (ref 0–5)
LYMPHOCYTES # BLD: 0.8 K/UL (ref 0.5–4.6)
LYMPHOCYTES NFR BLD: 9 % (ref 13–44)
MCH RBC QN AUTO: 29 PG (ref 26.1–32.9)
MCHC RBC AUTO-ENTMCNC: 31.8 G/DL (ref 31.4–35)
MCV RBC AUTO: 91.4 FL (ref 82–102)
MONOCYTES # BLD: 0.9 K/UL (ref 0.1–1.3)
MONOCYTES NFR BLD: 10 % (ref 4–12)
NEUTS SEG # BLD: 7 K/UL (ref 1.7–8.2)
NEUTS SEG NFR BLD: 78 % (ref 43–78)
NRBC # BLD: 0 K/UL (ref 0–0.2)
PLATELET # BLD AUTO: 363 K/UL (ref 150–450)
PMV BLD AUTO: 9.4 FL (ref 9.4–12.3)
POTASSIUM SERPL-SCNC: 4 MMOL/L (ref 3.5–5.1)
PROT SERPL-MCNC: 7.2 G/DL (ref 6.3–8.2)
RBC # BLD AUTO: 4.65 M/UL (ref 4.05–5.2)
SODIUM SERPL-SCNC: 138 MMOL/L (ref 133–143)
TSH W FREE THYROID IF ABNORMAL: 1.33 UIU/ML (ref 0.36–3.74)
WBC # BLD AUTO: 8.9 K/UL (ref 4.3–11.1)

## 2023-02-08 NOTE — TELEPHONE ENCOUNTER
Zack Gimenez from Dr. Catarino Boogie office called. Patient had chest CT yesterday at [address] [place] [address]. This is showing Aspiration pneumonia. Asking to get her in soon.   She wants us to call the patient

## 2023-02-08 NOTE — TELEPHONE ENCOUNTER
Last seen: 5/9/22  Hx: severe COPD,CAD, DM, HTN, IBS, GERD    CT scan done by other MD concerning, asked that patient be seen soon in pulmonary. Contacted patient and worked in for office visit tomorrow afternoon.

## 2023-02-08 NOTE — PROGRESS NOTES
She is a 80-year-old female, history of severe COPD, hypoxia, tobacco use, lung nodules who is seen today as a work in visit secondary to abnormal chest CT 2/7/2023. Chest CT demonstrated new 11 mm spiculated nodule in the RLL. Interval development of coarse interstitial markings with bronchiectasis in the RLL, RML and to lesser degree in the lingula. Study was compared to her screening CT 6/2022. DIAGNOSTICS:         Chest CT 6/2011. Chest CT 9/2011 - suggestion of minimal stable emphysematous change in the apices. 2 mm nodule in the RML along the minor fissure is unchanged. CXR's 12/2013. CXR 12/30/13 - reticular densities left lung base and periphery of right mid lung, may be areas of resolving consolidation. CXR 2/27/14 - clear lungs, no effusion or infiltrate. Spirometry 2/27/14 - Moderately severe obstruction, with low vital capacity. Spirometry 2/2015. CPFT's 4/21/15 - moderate airflow obstruction. There is improvement after bronchodilator. Lung volumes suggest mild air trapping. Diffusing capacity is severely reduced. Together this  would be consistent with moderate COPD, particularly with severe emphysema or complicating pulmonary vascular disease. Spirometry 11/23/2015 - severe obstructive defect with decreased FVC. Coughing during study, no significant change from 2/2015 but interval decline compared to 2014. Spirometry 9/16/2016 - severe obstructive defect, decreased FVC, no significant change. LDCT 9/2016 - Chronic lung changes, 2 tiny noncalcified granulomas on the right. Lung RADS category 2-benign. Recommendation-continue with annual LDCT in 12 months as long as the patient  remains a candidate for screening at that time. PA and lateral chest x-ray 6/8/2017-increased streaky bibasilar atelectasis and/or consolidation. LDCT 9/25/2017-stable small calcified granulomas in the right upper lobe. No new pulmonary nodules.    Spirometry 6/22/2012-severe obstructive defect with decreased FVC. No significant interval change. Chest CT 9/2018-stable small calcified nodules on the right lung. There are several new tiny nodules scattered bilaterally, indeterminate. Felt to possibly represent  mild atypical pneumonia. Spirometry 12/24/2018- severe obstructive defect with decreased FVC. Interval decline in FVC, no significant change in FEV1. Chest CT 9/30/2019-interval resolution of previously new soft tissue nodule, suggesting a benign/inflammatory etiology. Spirometry 10/24/2019-severe obstructive defect with decreased FVC, no significant change. CT C/A/P-3/21/2020- negative for mediastinal, hilar adenopathy.  + Moderate emphysema. Perihilar bronchial wall thickening with endobronchial debris extending  into the RLL without consolidation, likely sequela of aspiration. 3 vague groundglass nodules at RLL which are new from previous exam, measuring up to 6 mm. Follow-up CT recommended in 3 months. Echo 3/19/2020-EF 55-60%. No right to left shunt. No evidence for pulmonary hypertension. CT 6/19/2020 - decrease in size of nodules, largest of 6 mm decreased to 4 mm. No new nodules. Emphysematous changes. Spirometry 3/22/2021-severe obstructive defect, no significant change. LDCT 6/21/2021 which demonstrated stable small pulmonary nodules, including spiculated 3 mm nodule GLENNY. Small calcified granulomas were also stable. Screening CT is recommended in 12 months. Spirometry 5/9/2022-severe obstructive defect with decreased FVC, interval improvement in FVC, trend toward worsening in FEV1.

## 2023-02-09 ENCOUNTER — OFFICE VISIT (OUTPATIENT)
Dept: PULMONOLOGY | Age: 63
End: 2023-02-09

## 2023-02-09 ENCOUNTER — HOSPITAL ENCOUNTER (OUTPATIENT)
Dept: GENERAL RADIOLOGY | Age: 63
Discharge: HOME | End: 2023-02-09
Payer: COMMERCIAL

## 2023-02-09 VITALS
OXYGEN SATURATION: 99 % | HEIGHT: 66 IN | WEIGHT: 127 LBS | TEMPERATURE: 97.1 F | HEART RATE: 110 BPM | BODY MASS INDEX: 20.41 KG/M2 | DIASTOLIC BLOOD PRESSURE: 86 MMHG | SYSTOLIC BLOOD PRESSURE: 124 MMHG

## 2023-02-09 DIAGNOSIS — R93.89 ABNORMAL CHEST CT: Primary | ICD-10-CM

## 2023-02-09 DIAGNOSIS — R91.8 PULMONARY INFILTRATES: ICD-10-CM

## 2023-02-09 DIAGNOSIS — J96.11 CHRONIC RESPIRATORY FAILURE WITH HYPOXIA (HCC): ICD-10-CM

## 2023-02-09 DIAGNOSIS — J47.9 BRONCHIECTASIS WITHOUT COMPLICATION (HCC): ICD-10-CM

## 2023-02-09 DIAGNOSIS — R63.4 WEIGHT LOSS: ICD-10-CM

## 2023-02-09 DIAGNOSIS — F17.219 NICOTINE DEPENDENCE, CIGARETTES, WITH UNSPECIFIED NICOTINE-INDUCED DISORDERS: ICD-10-CM

## 2023-02-09 DIAGNOSIS — R05.9 COUGH, UNSPECIFIED TYPE: ICD-10-CM

## 2023-02-09 DIAGNOSIS — R91.1 LUNG NODULE: ICD-10-CM

## 2023-02-09 DIAGNOSIS — R11.2 NAUSEA AND VOMITING, UNSPECIFIED VOMITING TYPE: ICD-10-CM

## 2023-02-09 DIAGNOSIS — K21.00 GASTROESOPHAGEAL REFLUX DISEASE WITH ESOPHAGITIS, UNSPECIFIED WHETHER HEMORRHAGE: ICD-10-CM

## 2023-02-09 DIAGNOSIS — J44.9 CHRONIC OBSTRUCTIVE PULMONARY DISEASE, UNSPECIFIED COPD TYPE (HCC): ICD-10-CM

## 2023-02-09 PROCEDURE — A4641 RADIOPHARM DX AGENT NOC: HCPCS | Performed by: NURSE PRACTITIONER

## 2023-02-09 PROCEDURE — 6360000004 HC RX CONTRAST MEDICATION: Performed by: NURSE PRACTITIONER

## 2023-02-09 PROCEDURE — 74240 X-RAY XM UPR GI TRC 1CNTRST: CPT

## 2023-02-09 RX ORDER — BENZONATATE 200 MG/1
200 CAPSULE ORAL 3 TIMES DAILY PRN
Qty: 30 CAPSULE | Refills: 0 | Status: SHIPPED | OUTPATIENT
Start: 2023-02-09 | End: 2023-02-19

## 2023-02-09 RX ORDER — AMOXICILLIN AND CLAVULANATE POTASSIUM 875; 125 MG/1; MG/1
TABLET, FILM COATED ORAL
Qty: 20 TABLET | Refills: 0 | Status: SHIPPED | OUTPATIENT
Start: 2023-02-09

## 2023-02-09 RX ADMIN — BARIUM SULFATE 1 TABLET: 700 TABLET ORAL at 10:35

## 2023-02-09 RX ADMIN — BARIUM SULFATE 355 ML: 0.6 SUSPENSION ORAL at 10:35

## 2023-02-09 NOTE — PATIENT INSTRUCTIONS
Sputum cultures x 3 separate mornings - routine cultures and mycobacterial cultures. Store in refrigerator until all 3 are collected, then present specimen to lab. Once collected, begin augmentin 1 with food twice daily for 10 days. OTC probiotic per instructions. DuoNeb via nebulizer 4 times daily, budesonide twice daily, rinse mouth after use. Albuterol inhaler when away from home/nebulizer. OTC mucolytic, (mucinex or generic equivalent of guaifenesin), 2400mg in 24 hours in divided doses as needed to thin mucous. Tessalon Perles refilled at her request for cough suppression. Continue Protonix with sleep as prescribed. Continue to elevate head of bed, avoid eating or drinking within 1 hour of lying down. Refrain from spicy or acidic foods and limit caffeine and mints. Boost or Ensure supplements, multivitamin daily. Chest CT in 6 weeks for follow-up of pulmonary infiltrates, nodule. Appt in 7 - 8 weeks, sooner if needed. Smoking cessation.

## 2023-02-09 NOTE — PROGRESS NOTES
[address] Jody Martínez [address] [facility], [address] [facility]  [phone]    Patient Name:  Kenna Santiago    YOB: 1960    Office Visit 2/9/2023      CHIEF COMPLAINT:      Chief Complaint   Patient presents with    Other     Work-in         ASSESSMENT:   (Medical Decision Making)                                                                                                                                          Encounter Diagnoses   Name Primary? Abnormal chest CT Yes    Lung nodule     Chronic obstructive pulmonary disease, unspecified COPD type (HCC)     Chronic respiratory failure with hypoxia (HCC)     Nicotine dependence, cigarettes, with unspecified nicotine-induced disorders     Bronchiectasis without complication (HCC)     Pulmonary infiltrates     Weight loss      Chest CT demonstrated new 11 mm spiculated nodule in the RLL. Interval development of coarse interstitial markings with bronchiectasis in the RLL, RML and to lesser degree in the lingula. Study was compared to her screening CT 6/2022. She has been treated with Augmentin, Levaquin, Zithromax. Has cough productive of red tinged/brown/yellow sputum. She has had some fever, chills and night sweats. She is afebrile today. She has experienced 5 pound weight loss recently, 27 pounds over the past 2 years. She has increased risk for lung cancer due to tobacco use, has had surveillance CTs, most recently 6/2022 with stable findings but area of architectural distortion and infiltrate in the RUL, for which she was treated with antibiotics and steroids. She missed her recent follow-up appointment in November. She is compliant with prescribed regimen for COPD. She has decreased tobacco consumption but is still smoking. She denies symptoms of misty aspiration. Her daughter has not observed any episodes consistent with aspiration.   Upper GI with barium swallow performed today demonstrated sliding type I hiatal hernia, no GERD. No ulcers. She is already taking precautionary measures against reflux, is on Protonix at bedtime, sleeps with a wedge. PLAN:     Recent chest CT, case, reviewed with Dr. Molly Hinton. Sputum cultures x 3 separate mornings - routine cultures and mycobacterial cultures. Once collected, begin augmentin 1 with food twice daily for 10 days. OTC probiotic per instructions. DuoNeb via nebulizer 4 times daily, budesonide twice daily, rinse mouth after use. Albuterol inhaler when away from home/nebulizer. OTC mucolytic, (mucinex or generic equivalent of guaifenesin), 2400mg in 24 hours in divided doses as needed to thin mucous. Tessalon Perles refilled at her request for cough suppression. Continue Protonix with sleep as prescribed. Continue to elevate head of bed, avoid eating or drinking within 1 hour of lying down. Refrain from spicy or acidic foods and limit caffeine and mints. Boost or Ensure supplements, multivitamin daily. Chest CT in 6 weeks for follow-up of pulmonary infiltrates, nodule. Appt in 7 - 8 weeks, sooner if needed. Smoking cessation. Follow-up and Dispositions    Return in about 2 months (around 4/9/2023) for MD or Murali, 40  min appt, CT prior to appt, lung nodule, infiltrates, COPD.              Orders Placed This Encounter    Culture, Respiratory     Standing Status:   Future     Standing Expiration Date:   2/9/2024    AFB Culture + Smear W/Rflx ID From Culture     Standing Status:   Future     Standing Expiration Date:   2/9/2024    Culture, Respiratory     Standing Status:   Future     Standing Expiration Date:   2/9/2024    AFB Culture + Smear W/Rflx ID From Culture     Standing Status:   Future     Standing Expiration Date:   2/9/2024    Culture, Respiratory     Standing Status:   Future     Standing Expiration Date:   2/9/2024    AFB Culture + Smear W/Rflx ID From Culture     Standing Status:   Future     Standing Expiration Date: 2024    CT CHEST WO CONTRAST     Standing Status:   Future     Standing Expiration Date:   2024     Order Specific Question:   Reason for exam:     Answer:   follow up pulmonary infiltrates, lung nodule    amoxicillin-clavulanate (AUGMENTIN) 875-125 MG per tablet     Si po bid with food for 10 days     Dispense:  20 tablet     Refill:  0    benzonatate (TESSALON) 200 MG capsule     Sig: Take 1 capsule by mouth 3 times daily as needed for Cough     Dispense:  30 capsule     Refill:  0             MARCELLO PINTO CNP    Total  time spent with patient - 46 min. Collaborating MD: Dr. Vinicius Arrington:    She is a 51-year-old female, history of severe COPD, hypoxia, tobacco use, lung nodules who is seen today as a work in visit secondary to abnormal chest CT 2023. She has been treated with Augmentin, Levaquin, Zithromax. Has cough productive of red tinged/brown/yellow sputum. She denies misty hemoptysis. She has had some fever, chills and night sweats. She is afebrile today. She has experienced 5 pound weight loss recently, 27 pounds over the past 2 years. She has increased risk for lung cancer due to tobacco use, has had surveillance CTs, most recently 2022 with stable findings but area of architectural distortion and infiltrate in the RUL, for which she was treated with antibiotics and steroids. She missed her recent follow-up appointment in November. She is compliant with prescribed regimen for COPD. She has decreased tobacco consumption but is still smoking. She denies symptoms of misty aspiration. Her daughter has not observed any episodes consistent with aspiration. Upper GI with barium swallow performed today demonstrated sliding type I hiatal hernia, no GERD. No ulcers. She is already taking precautionary measures against reflux, is on Protonix at bedtime, sleeps with a wedge.      Chest CT demonstrated new 11 mm spiculated nodule in the RLL.  Interval development of coarse interstitial markings with bronchiectasis in the RLL, RML and to lesser degree in the lingula. Study was compared to her screening CT 6/2022. DIAGNOSTICS:         Chest CT 6/2011. Chest CT 9/2011 - suggestion of minimal stable emphysematous change in the apices. 2 mm nodule in the RML along the minor fissure is unchanged. CXR's 12/2013. CXR 12/30/13 - reticular densities left lung base and periphery of right mid lung, may be areas of resolving consolidation. CXR 2/27/14 - clear lungs, no effusion or infiltrate. Spirometry 2/27/14 - Moderately severe obstruction, with low vital capacity. Spirometry 2/2015. CPFT's 4/21/15 - moderate airflow obstruction. There is improvement after bronchodilator. Lung volumes suggest mild air trapping. Diffusing capacity is severely reduced. Together this  would be consistent with moderate COPD, particularly with severe emphysema or complicating pulmonary vascular disease. Spirometry 11/23/2015 - severe obstructive defect with decreased FVC. Coughing during study, no significant change from 2/2015 but interval decline compared to 2014. Spirometry 9/16/2016 - severe obstructive defect, decreased FVC, no significant change. LDCT 9/2016 - Chronic lung changes, 2 tiny noncalcified granulomas on the right. Lung RADS category 2-benign. Recommendation-continue with annual LDCT in 12 months as long as the patient  remains a candidate for screening at that time. PA and lateral chest x-ray 6/8/2017-increased streaky bibasilar atelectasis and/or consolidation. LDCT 9/25/2017-stable small calcified granulomas in the right upper lobe. No new pulmonary nodules. Spirometry 6/22/2012-severe obstructive defect with decreased FVC. No significant interval change. Chest CT 9/2018-stable small calcified nodules on the right lung. There are several new tiny nodules scattered bilaterally, indeterminate.   Felt to possibly represent  mild atypical pneumonia. Spirometry 12/24/2018- severe obstructive defect with decreased FVC. Interval decline in FVC, no significant change in FEV1. Chest CT 9/30/2019-interval resolution of previously new soft tissue nodule, suggesting a benign/inflammatory etiology. Spirometry 10/24/2019-severe obstructive defect with decreased FVC, no significant change. CT C/A/P-3/21/2020- negative for mediastinal, hilar adenopathy.  + Moderate emphysema. Perihilar bronchial wall thickening with endobronchial debris extending  into the RLL without consolidation, likely sequela of aspiration. 3 vague groundglass nodules at RLL which are new from previous exam, measuring up to 6 mm. Follow-up CT recommended in 3 months. Echo 3/19/2020-EF 55-60%. No right to left shunt. No evidence for pulmonary hypertension. CT 6/19/2020 - decrease in size of nodules, largest of 6 mm decreased to 4 mm. No new nodules. Emphysematous changes. Spirometry 3/22/2021-severe obstructive defect, no significant change. LDCT 6/21/2021 which demonstrated stable small pulmonary nodules, including spiculated 3 mm nodule GLENNY. Small calcified granulomas were also stable. Screening CT is recommended in 12 months. Spirometry 5/9/2022-severe obstructive defect with decreased FVC, interval improvement in FVC, trend toward worsening in FEV1.  _____________________________________________________________      REVIEW OF SYSTEMS:    ROS       PHYSICAL EXAM:    Vitals:    02/09/23 1254   BP: 124/86   Pulse: (!) 110   Temp: 97.1 °F (36.2 °C)   TempSrc: Skin   SpO2: 99%   Weight: 127 lb (57.6 kg)   Height: 5' 6\" (1.676 m)        Body mass index is 20.5 kg/m². GENERAL APPEARANCE:  The patient is thin and in no respiratory distress. HEENT:  PERRL. Conjunctivae unremarkable. NECK/LYMPHATIC:   Symmetrical with no elevation of jugular venous pulsation. Trachea midline. No thyroid enlargement.   No cervical adenopathy. LUNGS:   Normal respiratory effort with symmetrical lung expansion. Breath sounds-wet sounding cough, minimal scattered rhonchi, no definite wheeze, very faint crackles, right greater than left. Charlett Bigger HEART:   There is a normal rate and regular rhythm. No murmur, rub, or gallop. There is no edema in the lower extremities. NEURO:  The patient is alert and oriented to person, place, and time. Memory appears intact and mood is normal.  No gross sensorimotor deficits are present. DIAGNOSTIC TESTS: Studies were personally reviewed by me and discussed with the patient. CXR:      XR CHEST (2 VW) 01/20/2023      Findings: 2 views of the chest submitted demonstrate the cardiac silhouette and  mediastinum to be unremarkable. There is no pleural effusion or pneumothorax. The lungs are hyperexpanded but otherwise clear. The bones are unremarkable. Impression  Hyperexpanded lungs, otherwise no acute abnormality. CT WITHOUT CONTRAST:    CT CHEST WO CONTRAST 02/07/2023    Narrative  CT CHEST without CONTRAST. FINDINGS:  LUNGS:  Small pulmonary nodule left upper lobe, axial image #12 stable. There are new coarse interstitial markings in the right middle lobe, right lower  lobe and a small amount in the lingula. There is associated bronchiectasis. There is an 11 mm spiculated pulmonary nodule anterior right lower lobe axial  image #51  AIRWAYS: Trachea and proximal bronchi grossly patent. PLEURA: No effusion or thickening or calcifications. LYMPH NODES: No enlarged axillary, hilar or mediastinal lymph nodes. HEART: Normal size. CORONARIES: Dense calcifications. AORTA: Normal caliber with calcifications  UPPER ABDOMEN: Normal size adrenal glands. Cholecystectomy clips. SKELETAL/CHEST WALL: DJD, otherwise no gross bony lesions.     Impression  1) Interval development of coarse interstitial markings with bronchiectasis in  the right lower lobe, right middle lobe and to a lesser degree in the lingula. This could represent chronic aspiration. Other inflammatory or neoplastic  conditions not excluded. 2) There is an 11 mm spiculated nodule in the anterior segment of the right  lower lobe which is new. 3) Pulmonary consultation may be helpful. LDCT SCREENING:    CT LUNG SCREENING 06/22/2022    Narrative  CT lung screening    INDICATION:  30+ pack-year smoking history    COMPARISON: Low-dose screening chest CT 6/21/2021. Multiple axial images were obtained through the chest without IV contrast.  Low  dose CT protocol was used. Radiation dose reduction techniques were used for  this study: All CT scans performed at this facility use one or all of the  following: Automated exposure control, adjustment of the mA and/or kVp according  to patient's size, iterative reconstruction. FINDINGS:    LUNGS AND PLEURA: Area of probable nodular scarring measures 0.5 cm anterior  left upper lobe. Centrilobular emphysema is present. Area of architectural  distortion and infiltrate anteromedial right upper lobe is new in the interval  since prior exam possibly infectious or inflammatory in nature. Calcified  granuloma lateral right middle lobe on image 149 of series 3. No new or  enlarging lung lesions. No pleural effusions. MEDIASTINUM/AXILLAE: Heart is normal in size. No pericardial effusion. Coronary  artery calcification is noted. Esophagus and thyroid gland are unremarkable  where imaged. No enlarged lymph nodes. UPPER ABDOMEN: Unremarkable. MUSCULOSKELETAL: Normal.    Impression  1. Stable area of nodular scarring anterior left upper lobe when compared to  prior exam. New area of questionable infiltrate or atelectasis anteromedial  right upper lobe. Infectious or inflammatory process is possible. Lungs  otherwise clear. Category 2: Benign appearance.     Category 2:  Benign Appearance - Consider annual LDCT until patient no longer  eligible for definitive treatment ( P2 )  -  Solid <6 mm in size, new nodules < 4mm  -  Part-solid nodules < 6 mm total diameter  -  Perifissural < 10mm  -  Ground glass nodules < 30 mm in size  -  Nodules which have demonstrated > 3 month stability      PET SCAN:    No results found for this or any previous visit from the past 365 days. Past Medical History:   Diagnosis Date    Acute renal failure ([address] 12/5/2013    Baseline creatinine was 0.8, and currently it is 2.9     Acute respiratory failure ([address] 12/5/2013    Adverse effect of anesthesia     difficulty waking up    Arthritis     Back pain     CAD (coronary artery disease)     CAP (community acquired pneumonia) 12/7/2013    COPD exacerbation ([address] 6/8/2017    Oxygen at 3lpm continuously    CVA (cerebral vascular accident) ([address] 03/19/2020    left MCA with right sided weakness- S/P TPA    Cystitis     Diabetes ([address]     Difficult intubation 2005    with spine surgery at [facility]    Hyperlipidemia     on statin    Hypertension     Hypoproteinemia ([address] 12/11/2013    Hypotension, unspecified 12/5/2013    Hypoxemia 12/20/2013    follow up overnight oximetry on room air 3/2014 - resolved.     Ill-defined condition     hx of IC     Migraine headache     Myalgia     Pleural effusion 12/17/2013    Left: 450 ml of fluid was obtained       Positive occult stool blood test 12/9/2013    Psychiatric disorder     Respiratory failure ([address] 12/2013    required intubation    Sepsis ([address] 12/7/2013    Septicemia ([address] Dec 2013    no BP, pneumonia, \"Everything was failing\"- on vent 12/6-12/17    Unspecified adverse effect of anesthesia     took a long time to wake up 2005    Upper GI bleed 12/10/2013    GI evaluated        Patient Active Problem List   Diagnosis    Dependence on continuous supplemental oxygen    Polycythemia    Tracheobronchitis    Memory changes    Vitamin D deficiency    Mixed simple and mucopurulent chronic bronchitis (HCC)    Palpitations    Cystitis    Diabetes ([address]    Irritable bowel syndrome    Chronic pain syndrome    Snoring    Chronic cough    Fibromyalgia    Essential hypertension    Tobacco use    Depression    Anxiety    Nicotine dependence    Angina pectoris (HCC)    ASCVD (arteriosclerotic cardiovascular disease)    COPD (chronic obstructive pulmonary disease) (HCC)    Bursitis    Liver disease    GERD (gastroesophageal reflux disease)    Mixed hyperlipidemia    Lung nodules    CVA (cerebral vascular accident) [facility]    Osteoarthritis    Degeneration of lumbar intervertebral disc    Insomnia    Pain in joint, pelvic region and thigh    Signs and symptoms involving cognition    Rheumatism    On statin therapy       Past Surgical History:   Procedure Laterality Date    CARDIAC CATHETERIZATION  2011    Mild CAD per Dr Mine Dunn  12/29/2016    LAD:30% stenosis with \"sluggish flow\",Dx:30% stenosis. LCX OM:40-50% stenosis with - FFR,Small accesssory OM:70% ostial stenosis in 1 mmvessel,and RCA:30% diffuse CAD. CARDIAC CATHETERIZATION  07/15/2019    LV:EF55-60%. LM:nl. LAD:<30% stenosis. mLCX:50-60% stenosis. mRCA:60% stenosis with normal IFR (1.0) and irregularities    CHOLECYSTECTOMY      HYSTERECTOMY (CERVIX STATUS UNKNOWN)      hyesterectomy    NEUROLOGICAL SURGERY  2000 & 2005    spine X 4    ORTHOPEDIC SURGERY Right 10/2014    elbow    ORTHOPEDIC SURGERY      4 back surgeries         Social History     Socioeconomic History    Marital status:      Spouse name: None    Number of children: None    Years of education: None    Highest education level: None   Tobacco Use    Smoking status: Every Day     Packs/day: 1.00     Types: Cigarettes    Smokeless tobacco: Never    Tobacco comments:     Quit smoking: currently smoking 1/2 to 3/4  ppd   Substance and Sexual Activity    Alcohol use: Yes     Alcohol/week: 0.0 standard drinks    Drug use: No   Social History Narrative    Pt is  and lives with her . She on disability.         Family History   Problem Relation Age of Onset    Cancer Mother         colon    Alzheimer's Disease Mother     Heart Disease Father        Allergies   Allergen Reactions    Latex Other (See Comments)    Moxifloxacin Other (See Comments), Swelling and Shortness Of Breath    Umeclidinium-Vilanterol Other (See Comments)     Chest pain    Egg White (Egg Protein) Itching     Itching with some tongue swelling and nausea    Banana Swelling and Other (See Comments)    Citrullus Vulgaris Swelling    Diazepam Other (See Comments)     angry    Pseudoephedrine Other (See Comments)    Pseudoephedrine Hcl Palpitations       Current Outpatient Medications   Medication Sig    traMADol (ULTRAM) 50 MG tablet Take 1 tablet by mouth every 8 hours as needed for Pain for up to 120 days. Max Daily Amount: 150 mg    pantoprazole (PROTONIX) 40 MG tablet Take 1 tablet by mouth at bedtime    dilTIAZem (CARDIZEM CD) 180 MG extended release capsule TAKE 1 CAPSULE BY MOUTH EVERY DAY    ibuprofen (ADVIL;MOTRIN) 200 MG tablet Take 200 mg by mouth    atorvastatin (LIPITOR) 40 MG tablet TAKE 1 TABLET BY MOUTH EVERY DAY    metFORMIN (GLUCOPHAGE) 500 MG tablet TAKE TABLET BY MOUTH DAILY WITH BREAKFAST    clopidogrel (PLAVIX) 75 MG tablet TAKE 1 TABLET BY MOUTH DAILY. INDICATIONS: PREVENTION FOR A BLOOD CLOT GOING TO THE BRAIN    albuterol sulfate  (90 Base) MCG/ACT inhaler 2 puffs 4 times daily prn    aspirin 81 MG EC tablet Take by mouth daily    budesonide (PULMICORT) 0.5 MG/2ML nebulizer suspension Inhale 500 mcg into the lungs 2 times daily    Cholecalciferol 50 MCG (2000 UT) TABS Take by mouth daily    citalopram (CELEXA) 40 MG tablet TAKE 1 TABLET BY MOUTH EVERY DAY    ipratropium-albuterol (DUONEB) 0.5-2.5 (3) MG/3ML SOLN nebulizer solution 1 vial via nebulizer qid.  Indications: bronchi muscle spasm resulting from COPD    isosorbide mononitrate (IMDUR) 120 MG extended release tablet 1 tablet daily    meclizine (ANTIVERT) 25 MG tablet Take 25 mg by mouth 3 times daily as needed nitroGLYCERIN (NITROSTAT) 0.4 MG SL tablet Place 1 sl under the tongue q 5 min prn cp, max 3 sl in a 15-min time period. Call 911 if no relief after the 3rd sl. No current facility-administered medications for this visit. Over 50% of today's office visit was spent in face to face time reviewing test results, prognosis, importance of compliance, education about disease process, benefits of medications, instructions for management of acute symptoms, and follow up plans. Electronically signed. Dictated using voice recognition software.   Proof read but unrecognized errors may exist.

## 2023-02-22 ENCOUNTER — OFFICE VISIT (OUTPATIENT)
Dept: CARDIOLOGY CLINIC | Age: 63
End: 2023-02-22
Payer: COMMERCIAL

## 2023-02-22 VITALS
SYSTOLIC BLOOD PRESSURE: 90 MMHG | WEIGHT: 126.4 LBS | BODY MASS INDEX: 20.31 KG/M2 | HEART RATE: 112 BPM | DIASTOLIC BLOOD PRESSURE: 62 MMHG | HEIGHT: 66 IN

## 2023-02-22 DIAGNOSIS — R00.2 PALPITATIONS: Primary | ICD-10-CM

## 2023-02-22 DIAGNOSIS — I10 ESSENTIAL HYPERTENSION: ICD-10-CM

## 2023-02-22 DIAGNOSIS — I25.10 ASCVD (ARTERIOSCLEROTIC CARDIOVASCULAR DISEASE): ICD-10-CM

## 2023-02-22 PROCEDURE — 3074F SYST BP LT 130 MM HG: CPT | Performed by: INTERNAL MEDICINE

## 2023-02-22 PROCEDURE — 99214 OFFICE O/P EST MOD 30 MIN: CPT | Performed by: INTERNAL MEDICINE

## 2023-02-22 PROCEDURE — 3078F DIAST BP <80 MM HG: CPT | Performed by: INTERNAL MEDICINE

## 2023-02-22 ASSESSMENT — ENCOUNTER SYMPTOMS
HEMATOCHEZIA: 0
ABDOMINAL PAIN: 0
COUGH: 0
HEMATEMESIS: 0
BOWEL INCONTINENCE: 0
HOARSE VOICE: 0
WHEEZING: 0
SPUTUM PRODUCTION: 0
ORTHOPNEA: 0
BLURRED VISION: 0
DIARRHEA: 0
SHORTNESS OF BREATH: 1
VOMITING: 0
NAUSEA: 0
COLOR CHANGE: 0

## 2023-02-22 NOTE — PROGRESS NOTES
[facility] CARDIOLOGY  [address], [address]  PHONE: [phone]        23        NAME:  Lisseth Willams  : 1960  MRN: [id number]       SUBJECTIVE:   Lisseth Willams is a 61 y.o. female seen for a follow up visit regarding the following: The patient has severe COPD (O2 dependent),CAD,CVA,and primary hypertension. Last month,she reported worsening palpitations. Follow up 13 day holter monitor revealed NSR (96),9 brief episodes of PSVT (<16 beat runs),and rare PAC's and PVC's. She returns for follow up. I discussed monitor results with the patient and her daughter. Chief Complaint   Patient presents with    Results     Monitor         HPI:    Palpitations   This is a chronic problem. The problem has been unchanged. Nothing aggravates the symptoms. Associated symptoms include shortness of breath. Pertinent negatives include no anxiety, chest fullness, chest pain, coughing, diaphoresis, fever, irregular heartbeat, malaise/fatigue, nausea, near-syncope, numbness, syncope, vomiting or weakness. Treatments tried: Cardizem. Past Medical History, Past Surgical History, Family history, Social History, and Medications were all reviewed with the patient today and updated as necessary. Current Outpatient Medications:     verapamil (CALAN SR) 120 MG extended release tablet, Take 1 tablet by mouth daily, Disp: 30 tablet, Rfl: 5    traMADol (ULTRAM) 50 MG tablet, Take 1 tablet by mouth every 8 hours as needed for Pain for up to 120 days.  Max Daily Amount: 150 mg (Patient taking differently: Take 50 mg by mouth daily.), Disp: 90 tablet, Rfl: 3    pantoprazole (PROTONIX) 40 MG tablet, Take 1 tablet by mouth at bedtime, Disp: 90 tablet, Rfl: 1    ibuprofen (ADVIL;MOTRIN) 200 MG tablet, Take 200 mg by mouth, Disp: , Rfl:     atorvastatin (LIPITOR) 40 MG tablet, TAKE 1 TABLET BY MOUTH EVERY DAY, Disp: 90 tablet, Rfl: 2    metFORMIN (GLUCOPHAGE) 500 MG tablet, TAKE TABLET BY MOUTH DAILY WITH BREAKFAST, Disp: 90 tablet, Rfl: 2    clopidogrel (PLAVIX) 75 MG tablet, TAKE 1 TABLET BY MOUTH DAILY. INDICATIONS: PREVENTION FOR A BLOOD CLOT GOING TO THE BRAIN, Disp: 90 tablet, Rfl: 2    albuterol sulfate  (90 Base) MCG/ACT inhaler, 2 puffs 4 times daily prn, Disp: , Rfl:     aspirin 81 MG EC tablet, Take by mouth daily, Disp: , Rfl:     budesonide (PULMICORT) 0.5 MG/2ML nebulizer suspension, Inhale 500 mcg into the lungs 2 times daily, Disp: , Rfl:     Cholecalciferol 50 MCG (2000 UT) TABS, Take by mouth daily, Disp: , Rfl:     citalopram (CELEXA) 40 MG tablet, TAKE 1 TABLET BY MOUTH EVERY DAY, Disp: , Rfl:     ipratropium-albuterol (DUONEB) 0.5-2.5 (3) MG/3ML SOLN nebulizer solution, 1 vial via nebulizer qid. Indications: bronchi muscle spasm resulting from COPD, Disp: , Rfl:     isosorbide mononitrate (IMDUR) 120 MG extended release tablet, 1 tablet daily, Disp: , Rfl:     meclizine (ANTIVERT) 25 MG tablet, Take 25 mg by mouth 3 times daily as needed, Disp: , Rfl:     nitroGLYCERIN (NITROSTAT) 0.4 MG SL tablet, Place 1 sl under the tongue q 5 min prn cp, max 3 sl in a 15-min time period.  Call 911 if no relief after the 3rd sl., Disp: , Rfl:     amoxicillin-clavulanate (AUGMENTIN) 875-125 MG per tablet, 1 po bid with food for 10 days (Patient not taking: Reported on 2/22/2023), Disp: 20 tablet, Rfl: 0  Allergies   Allergen Reactions    Latex Other (See Comments)    Moxifloxacin Other (See Comments), Swelling and Shortness Of Breath    Umeclidinium-Vilanterol Other (See Comments)     Chest pain    Egg White (Egg Protein) Itching     Itching with some tongue swelling and nausea    Banana Swelling and Other (See Comments)    Citrullus Vulgaris Swelling    Diazepam Other (See Comments)     angry    Pseudoephedrine Other (See Comments)    Pseudoephedrine Hcl Palpitations     Past Medical History:   Diagnosis Date    Acute renal failure ([facility] [address] 12/5/2013    Baseline creatinine was 0.8, and currently it is 2.9     Acute respiratory failure ([address] 12/5/2013    Adverse effect of anesthesia     difficulty waking up    Arthritis     Back pain     CAD (coronary artery disease)     CAP (community acquired pneumonia) 12/7/2013    COPD exacerbation ([address] 6/8/2017    Oxygen at 3lpm continuously    CVA (cerebral vascular accident) ([address] 03/19/2020    left MCA with right sided weakness- S/P TPA    Cystitis     Diabetes ([address]     Difficult intubation 2005    with spine surgery at [facility]    Hyperlipidemia     on statin    Hypertension     Hypoproteinemia ([address] 12/11/2013    Hypotension, unspecified 12/5/2013    Hypoxemia 12/20/2013    follow up overnight oximetry on room air 3/2014 - resolved. Ill-defined condition     hx of IC     Migraine headache     Myalgia     Pleural effusion 12/17/2013    Left: 450 ml of fluid was obtained       Positive occult stool blood test 12/9/2013    Psychiatric disorder     Respiratory failure ([address] 12/2013    required intubation    Sepsis ([address] 12/7/2013    Septicemia ([address] Dec 2013    no BP, pneumonia, \"Everything was failing\"- on vent 12/6-12/17    Unspecified adverse effect of anesthesia     took a long time to wake up 2005    Upper GI bleed 12/10/2013    GI evaluated      Past Surgical History:   Procedure Laterality Date    CARDIAC CATHETERIZATION  2011    Mild CAD per Dr Tyler Moe  12/29/2016    LAD:30% stenosis with \"sluggish flow\",Dx:30% stenosis. LCX OM:40-50% stenosis with - FFR,Small accesssory OM:70% ostial stenosis in 1 mmvessel,and RCA:30% diffuse CAD. CARDIAC CATHETERIZATION  07/15/2019    LV:EF55-60%. LM:nl. LAD:<30% stenosis. mLCX:50-60% stenosis. mRCA:60% stenosis with normal IFR (1.0) and irregularities    CHOLECYSTECTOMY      HYSTERECTOMY (CERVIX STATUS UNKNOWN)      hyesterectomy    NEUROLOGICAL SURGERY  2000 & 2005    spine X 4    ORTHOPEDIC SURGERY Right 10/2014    elbow    ORTHOPEDIC SURGERY      4 back surgeries     Family History   Problem Relation Age of Onset    Cancer Mother         colon    Alzheimer's Disease Mother     Heart Disease Father       Social History     Tobacco Use    Smoking status: Every Day     Packs/day: 1.00     Types: Cigarettes    Smokeless tobacco: Never    Tobacco comments:     Quit smoking: currently smoking 1/2 to 3/4  ppd   Substance Use Topics    Alcohol use: Yes     Alcohol/week: 0.0 standard drinks       ROS:    Review of Systems   Constitutional: Positive for weight loss. Negative for chills, decreased appetite, diaphoresis, fever and malaise/fatigue. HENT:  Negative for congestion, hearing loss, hoarse voice and nosebleeds. Eyes:  Negative for blurred vision. Cardiovascular:  Positive for dyspnea on exertion and palpitations. Negative for chest pain, claudication, cyanosis, irregular heartbeat, leg swelling, near-syncope, orthopnea, paroxysmal nocturnal dyspnea and syncope. Respiratory:  Positive for shortness of breath. Negative for cough, sputum production and wheezing. Endocrine: Negative for polydipsia, polyphagia and polyuria. Skin:  Negative for color change. Gastrointestinal:  Negative for abdominal pain, bowel incontinence, diarrhea, hematemesis, hematochezia, nausea and vomiting. Genitourinary:  Negative for dysuria, frequency and hematuria. Neurological:  Negative for focal weakness, light-headedness, loss of balance, numbness, sensory change and weakness. Psychiatric/Behavioral:  Negative for altered mental status and memory loss. The patient is not nervous/anxious. PHYSICAL EXAM:   BP 90/62   Pulse (!) 112   Ht 5' 6\" (1.676 m)   Wt 126 lb 6.4 oz (57.3 kg)   BMI 20.40 kg/m²      Physical Exam  Constitutional:       Appearance: Normal appearance. HENT:      Head: Normocephalic and atraumatic. Nose: Nose normal.   Eyes:      Extraocular Movements: Extraocular movements intact. Pupils: Pupils are equal, round, and reactive to light.    Neck:      Vascular: No carotid bruit. Cardiovascular:      Rate and Rhythm: Regular rhythm. Pulses: Normal pulses. Heart sounds: No murmur heard. Comments: Distant heart sounds  Pulmonary:      Effort: Pulmonary effort is normal.      Breath sounds: Normal breath sounds. Comments: Distant BS  Abdominal:      General: Abdomen is flat. Bowel sounds are normal.      Palpations: Abdomen is soft. Musculoskeletal:         General: Normal range of motion. Cervical back: Normal range of motion and neck supple. Skin:     General: Skin is warm and dry. Neurological:      General: No focal deficit present. Mental Status: She is alert and oriented to person, place, and time. Psychiatric:         Mood and Affect: Mood normal.       Medical problems and test results were reviewed with the patient today.      Recent Results (from the past 672 hour(s))   TSH with Reflex    Collection Time: 02/07/23  1:34 PM   Result Value Ref Range    TSH w Free Thyroid if Abnormal 1.33 0.358 - 3.740 UIU/ML   CBC with Auto Differential    Collection Time: 02/07/23  1:34 PM   Result Value Ref Range    WBC 8.9 4.3 - 11.1 K/uL    RBC 4.65 4.05 - 5.2 M/uL    Hemoglobin 13.5 11.7 - 15.4 g/dL    Hematocrit 42.5 35.8 - 46.3 %    MCV 91.4 82 - 102 FL    MCH 29.0 26.1 - 32.9 PG    MCHC 31.8 31.4 - 35.0 g/dL    RDW 14.7 (H) 11.9 - 14.6 %    Platelets 949 670 - 926 K/uL    MPV 9.4 9.4 - 12.3 FL    nRBC 0.00 0.0 - 0.2 K/uL    Differential Type AUTOMATED      Seg Neutrophils 78 43 - 78 %    Lymphocytes 9 (L) 13 - 44 %    Monocytes 10 4.0 - 12.0 %    Eosinophils % 1 0.5 - 7.8 %    Basophils 1 0.0 - 2.0 %    Immature Granulocytes 1 0.0 - 5.0 %    Segs Absolute 7.0 1.7 - 8.2 K/UL    Absolute Lymph # 0.8 0.5 - 4.6 K/UL    Absolute Mono # 0.9 0.1 - 1.3 K/UL    Absolute Eos # 0.1 0.0 - 0.8 K/UL    Basophils Absolute 0.1 0.0 - 0.2 K/UL    Absolute Immature Granulocyte 0.1 0.0 - 0.5 K/UL   Comprehensive Metabolic Panel    Collection Time: 02/07/23  1:34 PM   Result Value Ref Range    Sodium 138 133 - 143 mmol/L    Potassium 4.0 3.5 - 5.1 mmol/L    Chloride 107 101 - 110 mmol/L    CO2 26 21 - 32 mmol/L    Anion Gap 5 2 - 11 mmol/L    Glucose 111 (H) 65 - 100 mg/dL    BUN 12 8 - 23 MG/DL    Creatinine 0.70 0.6 - 1.0 MG/DL    Est, Glom Filt Rate >60 >60 ml/min/1.73m2    Calcium 9.8 8.3 - 10.4 MG/DL    Total Bilirubin 0.4 0.2 - 1.1 MG/DL    ALT 16 12 - 65 U/L    AST 20 15 - 37 U/L    Alk Phosphatase 78 50 - 136 U/L    Total Protein 7.2 6.3 - 8.2 g/dL    Albumin 2.7 (L) 3.2 - 4.6 g/dL    Globulin 4.5 2.8 - 4.5 g/dL    Albumin/Globulin Ratio 0.6 0.4 - 1.6     AFB Culture + Smear W/Rflx ID From Culture    Collection Time: 02/13/23 10:12 AM    Specimen: Miscellaneous sample   Result Value Ref Range    Sample Site SPUTUM     AFB SPECIMEN PROCESSING Concentration     AFB Smear Negative     Culture, Respiratory    Collection Time: 02/13/23 10:12 AM    Specimen: Sputum   Result Value Ref Range    Special Requests NO SPECIAL REQUESTS      Gram stain 10 TO 60 WBCS SEEN PER OIF     Gram stain 0 TO 2 EPITHELIAL CELLS SEEN PER OIF     Gram stain MANY Gram positive cocci      Gram stain MODERATE GRAM NEGATIVE COCCI      Gram stain FEW GRAM POSITIVE RODS      Gram stain FEW Gram negative rods      Gram stain 2+ MUCUS PRESENT      Culture MODERATE NORMAL RESPIRATORY BARBARA       Lab Results   Component Value Date/Time    CHOL 157 01/16/2023 10:12 AM    HDL 69 01/16/2023 10:12 AM    VLDL 15 02/14/2022 10:33 AM     No results found for any visits on 02/22/23. ASSESSMENT and Oneil Meals was seen today for results. Diagnoses and all orders for this visit:    Palpitations? PSVT:Try swithing Cardizem 180 mg daily to Calan 120 mg daily. -     verapamil (CALAN SR) 120 MG extended release tablet; Take 1 tablet by mouth daily    Essential hypertension:Resolved    ASCVD (arteriosclerotic cardiovascular disease): Stable. No recent angina. Continue DAPT,Imdur,and Lipitor. Disposition:    Return in about 3 months (around 5/22/2023) for Follow up after Med change.                 Woody Sanchez MD  2/22/2023  11:27 AM

## 2023-03-07 ENCOUNTER — TELEPHONE (OUTPATIENT)
Dept: PULMONOLOGY | Age: 63
End: 2023-03-07

## 2023-03-07 ENCOUNTER — HOSPITAL ENCOUNTER (OUTPATIENT)
Dept: CT IMAGING | Age: 63
Discharge: HOME | End: 2023-03-09
Payer: COMMERCIAL

## 2023-03-07 DIAGNOSIS — R93.89 ABNORMAL CHEST CT: ICD-10-CM

## 2023-03-07 DIAGNOSIS — R91.1 LUNG NODULE: ICD-10-CM

## 2023-03-07 DIAGNOSIS — J47.9 BRONCHIECTASIS WITHOUT COMPLICATION (HCC): ICD-10-CM

## 2023-03-07 DIAGNOSIS — R91.8 PULMONARY INFILTRATES: ICD-10-CM

## 2023-03-07 PROCEDURE — 71250 CT THORAX DX C-: CPT

## 2023-03-07 NOTE — TELEPHONE ENCOUNTER
Please let her know that chest CT shows improvement in nodule in right lung base, infiltrate in L and RML/R lung base. Other nodule in GLENNY is stable. These findings suggest resolving infectious or inflammatory etiology. So far, the sputum culture is negative, awaiting final AFB. Also, I only see those results for 1 specimen, she was to have 3. Can you ask her if she provided 3 cups to the lab or only 1. If she provided 3, please contact the lab to determine if there has been a problem with processing. I will review all of above with her at her appt next month. Thank you.
Spoke to patient regarding CT results and pending sputum culture. Pt states she did provide 3 cups to the lab. Spoke to Geoff in the lab who stated they do not have a record of the other two specimens. Mary Cedeño NP aware.
LMP age 55

## 2023-03-13 ENCOUNTER — PATIENT MESSAGE (OUTPATIENT)
Dept: CARDIOLOGY CLINIC | Age: 63
End: 2023-03-13

## 2023-03-13 ENCOUNTER — APPOINTMENT (OUTPATIENT)
Dept: CT IMAGING | Age: 63
End: 2023-03-13
Payer: COMMERCIAL

## 2023-03-13 ENCOUNTER — HOSPITAL ENCOUNTER (EMERGENCY)
Age: 63
Discharge: HOME | End: 2023-03-13
Attending: EMERGENCY MEDICINE
Payer: COMMERCIAL

## 2023-03-13 ENCOUNTER — APPOINTMENT (OUTPATIENT)
Dept: GENERAL RADIOLOGY | Age: 63
End: 2023-03-13
Payer: COMMERCIAL

## 2023-03-13 VITALS
DIASTOLIC BLOOD PRESSURE: 92 MMHG | RESPIRATION RATE: 21 BRPM | WEIGHT: 124 LBS | HEIGHT: 66 IN | SYSTOLIC BLOOD PRESSURE: 112 MMHG | BODY MASS INDEX: 19.93 KG/M2 | TEMPERATURE: 97.6 F | HEART RATE: 82 BPM | OXYGEN SATURATION: 93 %

## 2023-03-13 DIAGNOSIS — N39.0 ACUTE UTI: ICD-10-CM

## 2023-03-13 DIAGNOSIS — R42 DIZZINESS: Primary | ICD-10-CM

## 2023-03-13 LAB
ALBUMIN SERPL-MCNC: 4.4 G/DL (ref 3.2–4.6)
ALBUMIN/GLOB SERPL: 1.1 (ref 0.4–1.6)
ALP SERPL-CCNC: 99 U/L (ref 45–117)
ALT SERPL-CCNC: 12 U/L (ref 13–61)
ANION GAP SERPL CALC-SCNC: 15 MMOL/L (ref 2–11)
APPEARANCE UR: ABNORMAL
AST SERPL-CCNC: 21 U/L (ref 15–37)
BACTERIA URNS QL MICRO: ABNORMAL /HPF
BASOPHILS # BLD: 0.1 K/UL (ref 0–0.2)
BASOPHILS NFR BLD: 1 % (ref 0–2)
BILIRUB SERPL-MCNC: 0.6 MG/DL (ref 0.2–1.1)
BILIRUB UR QL: ABNORMAL
BUN SERPL-MCNC: 22 MG/DL (ref 7–18)
CALCIUM SERPL-MCNC: 10.2 MG/DL (ref 8.3–10.4)
CASTS URNS QL MICRO: ABNORMAL /LPF
CHLORIDE SERPL-SCNC: 101 MMOL/L (ref 98–107)
CO2 SERPL-SCNC: 23 MMOL/L (ref 21–32)
COLOR UR: ABNORMAL
CREAT SERPL-MCNC: 0.99 MG/DL (ref 0.6–1)
CRYSTALS URNS QL MICRO: 0 /LPF
DIFFERENTIAL METHOD BLD: ABNORMAL
EKG ATRIAL RATE: 103 BPM
EKG DIAGNOSIS: NORMAL
EKG P AXIS: 86 DEGREES
EKG P-R INTERVAL: 139 MS
EKG Q-T INTERVAL: 375 MS
EKG QRS DURATION: 82 MS
EKG QTC CALCULATION (BAZETT): 491 MS
EKG R AXIS: 56 DEGREES
EKG T AXIS: 66 DEGREES
EKG VENTRICULAR RATE: 103 BPM
EOSINOPHIL # BLD: 0.1 K/UL (ref 0–0.8)
EOSINOPHIL NFR BLD: 1 % (ref 0.5–7.8)
EPI CELLS #/AREA URNS HPF: ABNORMAL /HPF
ERYTHROCYTE [DISTWIDTH] IN BLOOD BY AUTOMATED COUNT: 16.4 % (ref 11.9–14.6)
GLOBULIN SER CALC-MCNC: 4 G/DL (ref 2.8–4.5)
GLUCOSE SERPL-MCNC: 130 MG/DL (ref 65–100)
GLUCOSE UR STRIP.AUTO-MCNC: NEGATIVE MG/DL
HCT VFR BLD AUTO: 49.9 % (ref 35.8–46.3)
HGB BLD-MCNC: 16.5 G/DL (ref 11.7–15.4)
HGB UR QL STRIP: ABNORMAL
IMM GRANULOCYTES # BLD AUTO: 0 K/UL (ref 0–0.5)
IMM GRANULOCYTES NFR BLD AUTO: 0 % (ref 0–5)
KETONES UR QL STRIP.AUTO: ABNORMAL MG/DL
LACTATE SERPL-SCNC: 1.4 MMOL/L (ref 0.4–2)
LACTATE SERPL-SCNC: 2.8 MMOL/L (ref 0.4–2)
LEUKOCYTE ESTERASE UR QL STRIP.AUTO: ABNORMAL
LYMPHOCYTES # BLD: 1.5 K/UL (ref 0.5–4.6)
LYMPHOCYTES NFR BLD: 13 % (ref 13–44)
MCH RBC QN AUTO: 29.8 PG (ref 26.1–32.9)
MCHC RBC AUTO-ENTMCNC: 33.1 G/DL (ref 31.4–35)
MCV RBC AUTO: 90.1 FL (ref 82–102)
MONOCYTES # BLD: 0.7 K/UL (ref 0.1–1.3)
MONOCYTES NFR BLD: 6 % (ref 4–12)
MUCOUS THREADS URNS QL MICRO: 0 /LPF
NEUTS SEG # BLD: 8.7 K/UL (ref 1.7–8.2)
NEUTS SEG NFR BLD: 78 % (ref 43–78)
NITRITE UR QL STRIP.AUTO: POSITIVE
NRBC # BLD: 0 K/UL (ref 0–0.2)
OTHER OBSERVATIONS: ABNORMAL
PH UR STRIP: 5.5 (ref 5–9)
PLATELET # BLD AUTO: 265 K/UL (ref 150–450)
PMV BLD AUTO: 8.3 FL (ref 9.4–12.3)
POTASSIUM SERPL-SCNC: 4.3 MMOL/L (ref 3.5–5.1)
PROCALCITONIN SERPL-MCNC: 0.04 NG/ML (ref 0–0.49)
PROT SERPL-MCNC: 8.4 G/DL (ref 6.4–8.2)
PROT UR STRIP-MCNC: >300 MG/DL
RBC # BLD AUTO: 5.54 M/UL (ref 4.05–5.2)
RBC #/AREA URNS HPF: ABNORMAL /HPF
SODIUM SERPL-SCNC: 139 MMOL/L (ref 133–143)
SP GR UR REFRACTOMETRY: >=1.03 (ref 1–1.02)
UROBILINOGEN UR QL STRIP.AUTO: 1 EU/DL (ref 0.2–1)
WBC # BLD AUTO: 11.1 K/UL (ref 4.3–11.1)
WBC URNS QL MICRO: ABNORMAL /HPF

## 2023-03-13 PROCEDURE — 85025 COMPLETE CBC W/AUTO DIFF WBC: CPT

## 2023-03-13 PROCEDURE — 96365 THER/PROPH/DIAG IV INF INIT: CPT

## 2023-03-13 PROCEDURE — 99285 EMERGENCY DEPT VISIT HI MDM: CPT

## 2023-03-13 PROCEDURE — 6360000002 HC RX W HCPCS: Performed by: NURSE PRACTITIONER

## 2023-03-13 PROCEDURE — 87040 BLOOD CULTURE FOR BACTERIA: CPT

## 2023-03-13 PROCEDURE — 96361 HYDRATE IV INFUSION ADD-ON: CPT

## 2023-03-13 PROCEDURE — 84145 PROCALCITONIN (PCT): CPT

## 2023-03-13 PROCEDURE — 80053 COMPREHEN METABOLIC PANEL: CPT

## 2023-03-13 PROCEDURE — 87086 URINE CULTURE/COLONY COUNT: CPT

## 2023-03-13 PROCEDURE — 83605 ASSAY OF LACTIC ACID: CPT

## 2023-03-13 PROCEDURE — 2580000003 HC RX 258: Performed by: NURSE PRACTITIONER

## 2023-03-13 PROCEDURE — 81001 URINALYSIS AUTO W/SCOPE: CPT

## 2023-03-13 PROCEDURE — 71045 X-RAY EXAM CHEST 1 VIEW: CPT

## 2023-03-13 PROCEDURE — 70450 CT HEAD/BRAIN W/O DYE: CPT

## 2023-03-13 RX ORDER — 0.9 % SODIUM CHLORIDE 0.9 %
1000 INTRAVENOUS SOLUTION INTRAVENOUS
Status: COMPLETED | OUTPATIENT
Start: 2023-03-13 | End: 2023-03-13

## 2023-03-13 RX ORDER — SULFAMETHOXAZOLE AND TRIMETHOPRIM 800; 160 MG/1; MG/1
1 TABLET ORAL 2 TIMES DAILY
Qty: 10 TABLET | Refills: 0 | Status: SHIPPED | OUTPATIENT
Start: 2023-03-13 | End: 2023-03-18

## 2023-03-13 RX ADMIN — CEFTRIAXONE 1000 MG: 1 INJECTION, POWDER, FOR SOLUTION INTRAMUSCULAR; INTRAVENOUS at 14:41

## 2023-03-13 RX ADMIN — SODIUM CHLORIDE 1000 ML: 9 INJECTION, SOLUTION INTRAVENOUS at 14:41

## 2023-03-13 RX ADMIN — SODIUM CHLORIDE 1000 ML: 9 INJECTION, SOLUTION INTRAVENOUS at 13:06

## 2023-03-13 ASSESSMENT — PAIN - FUNCTIONAL ASSESSMENT
PAIN_FUNCTIONAL_ASSESSMENT: 0-10
PAIN_FUNCTIONAL_ASSESSMENT: 0-10

## 2023-03-13 ASSESSMENT — PAIN SCALES - GENERAL
PAINLEVEL_OUTOF10: 5
PAINLEVEL_OUTOF10: 0

## 2023-03-13 ASSESSMENT — LIFESTYLE VARIABLES
HOW OFTEN DO YOU HAVE A DRINK CONTAINING ALCOHOL: NEVER
HOW MANY STANDARD DRINKS CONTAINING ALCOHOL DO YOU HAVE ON A TYPICAL DAY: PATIENT DOES NOT DRINK

## 2023-03-13 NOTE — ED NOTES
Blood Culture drawn and 2nd staff member assisted (audited) Susannah STARR.       Tanya Roman RN  03/13/23 1068

## 2023-03-13 NOTE — DISCHARGE INSTRUCTIONS
As we discussed, your work-up in the emergency department today is concerning for a UTI. Take medication as prescribed. Discuss your medications with Dr. Christina Jessica. Return to the emergency department for any new, worsening, or concerning symptoms.

## 2023-03-13 NOTE — ED PROVIDER NOTES
Emergency Department Provider Note                   PCP:                Koby Conroy MD               Age: 61 y.o. Sex: female     DISPOSITION Decision To Discharge 03/13/2023 03:46:24 PM       ICD-10-CM    1. Dizziness  R42       2. Acute UTI  N39.0           MEDICAL DECISION MAKING  Complexity of Problems Addressed:  1 or more acute illness with systemic symptoms    Data Reviewed and Analyzed:  Category 1:   I reviewed records from an external source: provider visit notes from PCP. I reviewed records from an external source: provider visit notes from outside specialist.  I ordered each unique test.  I reviewed the results of each unique test.        Category 2:   ED EKG was independently interpreted in the absence of a cardiologist.  Rate: 103  EKG Interpretation: EKG Interpretation: sinus rhythm  ST Segments: Normal ST segments - NO STEMI    I independently ordered and reviewed the EKG. I independently ordered and reviewed the X-rays. Agree with radiologist impression  I independently ordered and reviewed the CT Scan. Agree with radiologist impression  I independently ordered and reviewed the labs, urinalysis    Category 3: Discussion of management or test interpretation. 60-year-old female presents emergency department today with chief complaint of dizziness. Patient appears in no acute distress. She is hypotensive and tachycardic upon arrival.  Chart review reveals that she is typically tachycardic. She states that her heart rate is usually over 100 when she sees her doctor. No focal neurodeficits noted on exam.  NIHSS score of 0. Labs ordered initially in triage. Labs, EKG, and CT scan ordered at time of exam including blood cultures, lactic acid, and procalcitonin need to tachycardia and hypertension but no suspected source at time of exam.  Patient is afebrile. Denies any pain or dysuria. Elevated lactic acid noted. No leukocytosis.   Procalcitonin normal.  Patient's blood pressure and heart rate responded well to IV fluids. Urine concerning for infection with positive nitrites and leukocytes. Patient given Rocephin in the ER for UTI. Patient reports being on multiple antibiotics in the recent past due to aspiration pneumonia. Chest x-ray is clear today. Most recently treated with Augmentin. Also has been on Ceftin in January. No recent urine culture found on file. She did have a culture in 2021 that showed E. coli. Will cover UTI with Bactrim. Did offer admission but patient has declined. She reports she feels much better after treatment given in the ER today. I have encouraged her to discuss her low blood pressure with her cardiologist.  Her blood pressure has much improved in the ER today with last blood pressure systolic 437. I have also encouraged close follow-up with primary care. Strict return precautions discussed. Her family is with her today and states they will bring her back immediately for any new or worsening symptoms. Risk of Complications and/or Morbidity of Patient Management:  Prescription drug management performed and Patient was discharged risks and benefits of hospitalization were considered     Is this patient to be included in the SEP-1 core measure due to severe sepsis or septic shock? Yes SEP-1 CORE MEASURE DATA      Sepsis Criteria   Severe Sepsis Criteria   Septic Shock Criteria       Must meet 2:    []Temp >100.9 F (38.3 C) or < 96.8 F (36 C)  [x]HR > 90  []RR > 20  []WBC > 12 or < 4 or 10% bands    AND:    [x] Infection Confirmed or Suspected.      Must meet 1:    [x]Lactate > 2       or   [x]Signs of Organ Dysfunction:    - SBP < 90 or MAP < 65  -Creatinine > 2 or increased from baseline  -Urine Output < 0.5 ml/kg/hr  -Bilirubin > 2  -INR > 1.5 (not anticoagulated)  -Platelets < 823,630  -Acute Respiratory Failure as evidenced by new need for NIPPV or mechanical ventilation   Must meet 1:    []Lactate > 4        or   []SBP < 90 or MAP < 65 for at least two readings in the first hour after fluid bolus administration    []Vasopressors initiated (if hypotension persists after fluid resuscitation)   Patient Vitals for the past 6 hrs:   BP Temp Pulse Resp SpO2 Height Weight Weight Method Percent Weight Change   03/13/23 1152 (!) 84/58 97.5 °F (36.4 °C) (!) 116 18 95 % 5' 6\" (1.676 m) 124 lb (56.2 kg) Stated 0   03/13/23 1307 (!) 94/58 97.5 °F (36.4 °C) 90 21 93 % -- -- -- --   03/13/23 1339 99/66 97.6 °F (36.4 °C) 81 -- -- -- -- -- --   03/13/23 1444 (!) 112/92 97.6 °F (36.4 °C) 82 -- -- -- -- -- --      Recent Labs     03/13/23  1303   WBC 11.1   LACTA 2.80*   CREATININE 0.99   BILITOT 0.6           Severe sepsis identified date: 3/13/23 time: 1224 and Infection (sepsis)  related to UTI (fill in source of infection) UTI identified date: 3/13/23 time: 1224    Fluid Resuscitation Rationale: At least 30 mL/KG IV fluid bolus ordered    Repeat lactate level: improving    Reassessment Exam: I have reassessed tissue perfusion and hemodynamic status after fluid bolus at this date/time: 3/13/23 at [facility] 1978 is a 61 y.o. female who presents to the Emergency Department with chief complaint of    Chief Complaint   Patient presents with    Dizziness      60-year-old female presents emergency department today with complaints of dizziness. Patient states that symptoms began on Saturday but seemed much worse today. She states she was at [facility] getting some groceries and felt like she was going to pass out. She states she felt a warmness in her arms and legs. She denies any headaches, vision changes, slurred speech, difficulty swallowing, chest pain, abdominal pain, shortness of breath. She does report that she had a medication change about 2 weeks ago but was not having any issues with this medication. The history is provided by the patient.       Review of Systems    Vitals signs and nursing note reviewed:  Patient Vitals for the past 4 hrs:    Temp Pulse Resp BP SpO2   03/13/23 1444 97.6 °F (36.4 °C) 82 -- (!) 112/92 --   03/13/23 1339 97.6 °F (36.4 °C) 81 -- 99/66 --   03/13/23 1307 97.5 °F (36.4 °C) 90 21 (!) 94/58 93 %          Physical Exam     Procedures          Orders Placed This Encounter   Procedures    Blood Culture 1    Blood Culture 2    Culture, Urine    XR CHEST PORTABLE    CT HEAD WO CONTRAST    CBC with Auto Differential    Comprehensive Metabolic Panel    Urinalysis w rflx microscopic    Lactate, Sepsis    Procalcitonin    Lactic Acid    Urinalysis, Micro    Lactic Acid    Cardiac Monitor - ED Only    Continuous Pulse Oximetry    Orthostatic blood pressure and pulse    EKG 12 Lead    Saline lock IV        Medications   0.9 % sodium chloride bolus (0 mLs IntraVENous Stopped 3/13/23 1400)   0.9 % sodium chloride bolus (0 mLs IntraVENous Stopped 3/13/23 1559)   cefTRIAXone (ROCEPHIN) 1,000 mg in sodium chloride 0.9 % 50 mL IVPB (mini-bag) (0 mg IntraVENous Stopped 3/13/23 1559)       Discharge Medication List as of 3/13/2023  3:59 PM        START taking these medications    Details   sulfamethoxazole-trimethoprim (BACTRIM DS) 800-160 MG per tablet Take 1 tablet by mouth 2 times daily for 5 days, Disp-10 tablet, R-0Normal              Past Medical History:   Diagnosis Date    Acute renal failure (HCC) 12/5/2013    Baseline creatinine was 0.8, and currently it is 2.9     Acute respiratory failure (HCC) 12/5/2013    Adverse effect of anesthesia     difficulty waking up    Arthritis     Back pain     CAD (coronary artery disease)     CAP (community acquired pneumonia) 12/7/2013    COPD exacerbation ([address] 6/8/2017    Oxygen at 3lpm continuously    CVA (cerebral vascular accident) ([facility] 03/19/2020    left MCA with right sided weakness- S/P TPA    Cystitis     Diabetes ([address]     Difficult intubation 2005    with spine surgery at [facility]    Hyperlipidemia     on statin    Hypertension     Hypoproteinemia ([address] 12/11/2013    Hypotension, unspecified 12/5/2013    Hypoxemia 12/20/2013    follow up overnight oximetry on room air 3/2014 - resolved. Ill-defined condition     hx of IC     Migraine headache     Myalgia     Pleural effusion 12/17/2013    Left: 450 ml of fluid was obtained       Positive occult stool blood test 12/9/2013    Psychiatric disorder     Respiratory failure ([facility] 12/2013    required intubation    Sepsis ([facility] 12/7/2013    Septicemia ([facility] Dec 2013    no BP, pneumonia, \"Everything was failing\"- on vent 12/6-12/17    Unspecified adverse effect of anesthesia     took a long time to wake up 2005    Upper GI bleed 12/10/2013    GI evaluated         Past Surgical History:   Procedure Laterality Date    CARDIAC CATHETERIZATION  2011    Mild CAD per Dr Luigi Jeans  12/29/2016    LAD:30% stenosis with \"sluggish flow\",Dx:30% stenosis. LCX OM:40-50% stenosis with - FFR,Small accesssory OM:70% ostial stenosis in 1 mmvessel,and RCA:30% diffuse CAD. CARDIAC CATHETERIZATION  07/15/2019    LV:EF55-60%. LM:nl. LAD:<30% stenosis. mLCX:50-60% stenosis. mRCA:60% stenosis with normal IFR (1.0) and irregularities    CHOLECYSTECTOMY      HYSTERECTOMY (CERVIX STATUS UNKNOWN)      hyesterectomy    NEUROLOGICAL SURGERY  2000 & 2005    spine X 4    ORTHOPEDIC SURGERY Right 10/2014    elbow    ORTHOPEDIC SURGERY      4 back surgeries        Family History   Problem Relation Age of Onset    Cancer Mother         colon    Alzheimer's Disease Mother     Heart Disease Father         Social History     Socioeconomic History    Marital status:    Tobacco Use    Smoking status: Every Day     Packs/day: 1.00     Types: Cigarettes    Smokeless tobacco: Never    Tobacco comments:     Quit smoking: currently smoking 1/2 to 3/4  ppd   Substance and Sexual Activity    Alcohol use: Yes     Alcohol/week: 0.0 standard drinks    Drug use: No   Social History Narrative    Pt is  and lives with her . She on disability. Allergies: Latex, Moxifloxacin, Umeclidinium-vilanterol, Egg white (egg protein), Banana, Citrullus vulgaris, Diazepam, Pseudoephedrine, and Pseudoephedrine hcl    Discharge Medication List as of 3/13/2023  3:59 PM        CONTINUE these medications which have NOT CHANGED    Details   verapamil (CALAN SR) 120 MG extended release tablet Take 1 tablet by mouth daily, Disp-30 tablet, R-5Normal      amoxicillin-clavulanate (AUGMENTIN) 875-125 MG per tablet 1 po bid with food for 10 days, Disp-20 tablet, R-0Normal      traMADol (ULTRAM) 50 MG tablet Take 1 tablet by mouth every 8 hours as needed for Pain for up to 120 days. Max Daily Amount: 150 mg, Disp-90 tablet, R-3Normal      pantoprazole (PROTONIX) 40 MG tablet Take 1 tablet by mouth at bedtime, Disp-90 tablet, R-1Normal      ibuprofen (ADVIL;MOTRIN) 200 MG tablet Take 200 mg by mouthHistorical Med      atorvastatin (LIPITOR) 40 MG tablet TAKE 1 TABLET BY MOUTH EVERY DAY, Disp-90 tablet, R-2Normal      metFORMIN (GLUCOPHAGE) 500 MG tablet TAKE TABLET BY MOUTH DAILY WITH BREAKFAST, Disp-90 tablet, R-2Normal      clopidogrel (PLAVIX) 75 MG tablet TAKE 1 TABLET BY MOUTH DAILY. INDICATIONS: PREVENTION FOR A BLOOD CLOT GOING TO THE BRAIN, Disp-90 tablet, R-2Normal      albuterol sulfate  (90 Base) MCG/ACT inhaler 2 puffs 4 times daily prnHistorical Med      aspirin 81 MG EC tablet Take by mouth dailyHistorical Med      budesonide (PULMICORT) 0.5 MG/2ML nebulizer suspension Inhale 500 mcg into the lungs 2 times dailyHistorical Med      Cholecalciferol 50 MCG (2000 UT) TABS Take by mouth dailyHistorical Med      citalopram (CELEXA) 40 MG tablet TAKE 1 TABLET BY MOUTH EVERY DAYHistorical Med      ipratropium-albuterol (DUONEB) 0.5-2.5 (3) MG/3ML SOLN nebulizer solution 1 vial via nebulizer qid.  Indications: bronchi muscle spasm resulting from COPDHistorical Med      isosorbide mononitrate (IMDUR) 120 MG extended release tablet 1 tablet dailyHistorical Med meclizine (ANTIVERT) 25 MG tablet Take 25 mg by mouth 3 times daily as neededHistorical Med      nitroGLYCERIN (NITROSTAT) 0.4 MG SL tablet Place 1 sl under the tongue q 5 min prn cp, max 3 sl in a 15-min time period. Call 911 if no relief after the 3rd sl. Historical Med              Results for orders placed or performed during the hospital encounter of 03/13/23   XR CHEST PORTABLE    Narrative    Exam: XR CHEST PORTABLE on 3/13/2023 12:46 PM    Clinical History: The Female patient is 61years old  presenting for Syncope. Comparison:  Chest CT 3/7/2023 and chest x-ray 1/20/2023    Findings:  Frontal view of the chest was obtained. Stable chronic interstitial changes are seen with underlying emphysematous  disease and basilar scarring. No pleural effusions are demonstrated. The  cardiomediastinal silhouette is within normal limits. There are no acute  osseous abnormalities. Impression    1. No acute cardiopulmonary process. CPT code(s) 80605           CT HEAD WO CONTRAST    Narrative    Exam: CT HEAD WO CONTRAST on 3/13/2023 12:42 PM    Clinical History: The Female patient is 61years old  presenting for dizziness. Technique: Thin slice axial CT images through the brain were obtained. All CT scans at this facility are performed using dose reduction/dose modulation  techniques, as appropriate the performed exam, including the following:   Automated Exposure Control; Adjustment of the mA and/or kV according to patient  size (this includes techniques or standardized protocols for targeted exams  where dose is matched to indication/reason for exam); and Use of Iterative  Reconstruction Technique. Radiation Exposure Indices:  Reference Air Kerma (Ka,r) = 1093 mGy-cm    Comparison:  Brain MRI 6/24/2020 and head CT 3/20/2020. Findings:      Cerebrum: Age-related senescent changes are seen with sulcal and ventricular  prominence. . There is chronic periventricular white matter disease with lacunae  throughout the corona radiata and centrum semiovale as well as basal ganglia. No  evidence of intracranial hemorrhage, mass, or other space-occupying lesion is  seen. There are no abnormal extra-axial fluid collections. Cerebellum: Normal cerebellar morphology is demonstrated. CSF spaces: The ventricular system is within normal limits. The basilar cisterns  are unremarkable. Brainstem: No evidence of ischemia, hemorrhage, or mass. Extracranial tissues: Visualized orbits and extracranial soft tissues are  unremarkable. Paranasal sinuses/Mastoids: Well-pneumatized and aerated. .    Calvarium: No acute osseous abnormality. Impression    1. Age-related senescent changes and chronic microvascular disease without  acute intracranial abnormality.       CPT code 72086   CBC with Auto Differential   Result Value Ref Range    WBC 11.1 4.3 - 11.1 K/uL    RBC 5.54 (H) 4.05 - 5.20 M/uL    Hemoglobin 16.5 (H) 11.7 - 15.4 g/dL    Hematocrit 49.9 (H) 35.8 - 46.3 %    MCV 90.1 82.0 - 102.0 FL    MCH 29.8 26.1 - 32.9 PG    MCHC 33.1 31.4 - 35.0 g/dL    RDW 16.4 (H) 11.9 - 14.6 %    Platelets 917 542 - 892 K/uL    MPV 8.3 (L) 9.4 - 12.3 FL    nRBC 0.00 0.0 - 0.2 K/uL    Differential Type AUTOMATED      Seg Neutrophils 78 43 - 78 %    Lymphocytes 13 13 - 44 %    Monocytes 6 4.0 - 12.0 %    Eosinophils % 1 0.5 - 7.8 %    Basophils 1 0.0 - 2.0 %    Immature Granulocytes 0 0.0 - 5.0 %    Segs Absolute 8.7 (H) 1.7 - 8.2 K/UL    Absolute Lymph # 1.5 0.5 - 4.6 K/UL    Absolute Mono # 0.7 0.1 - 1.3 K/UL    Absolute Eos # 0.1 0.0 - 0.8 K/UL    Basophils Absolute 0.1 0.0 - 0.2 K/UL    Absolute Immature Granulocyte 0.0 0.0 - 0.5 K/UL   Comprehensive Metabolic Panel   Result Value Ref Range    Sodium 139 133 - 143 mmol/L    Potassium 4.3 3.5 - 5.1 mmol/L    Chloride 101 98 - 107 mmol/L    CO2 23 21 - 32 mmol/L    Anion Gap 15 (H) 2 - 11 mmol/L    Glucose 130 (H) 65 - 100 mg/dL    BUN 22 (H) 7.0 - 18.0 MG/DL Creatinine 0.99 0.6 - 1.0 MG/DL    Est, Glom Filt Rate >60 >60 ml/min/1.73m2    Calcium 10.2 8.3 - 10.4 MG/DL    Total Bilirubin 0.6 0.2 - 1.1 MG/DL    ALT 12 (L) 13.0 - 61.0 U/L    AST 21 15 - 37 U/L    Alk Phosphatase 99 45.0 - 117.0 U/L    Total Protein 8.4 (H) 6.4 - 8.2 g/dL    Albumin 4.4 3.2 - 4.6 g/dL    Globulin 4.0 2.8 - 4.5 g/dL    Albumin/Globulin Ratio 1.1 0.4 - 1.6     Urinalysis w rflx microscopic   Result Value Ref Range    Color, UA JACINTO      Appearance SLIGHTLY CLOUDY      Specific Gravity, UA >=1.030 1.001 - 1.023    pH, Urine 5.5 5.0 - 9.0      Protein, UA >300 (A) NEG mg/dL    Glucose, UA Negative mg/dL    Ketones, Urine TRACE (A) NEG mg/dL    Bilirubin Urine MODERATE (A) NEG      Blood, Urine Trace Intact (A) NEG      Urobilinogen, Urine 1.0 0.2 - 1.0 EU/dL    Nitrite, Urine Positive (A) NEG      Leukocyte Esterase, Urine TRACE (A) NEG     Procalcitonin   Result Value Ref Range    Procalcitonin 0.04 0.00 - 0.49 ng/mL   Lactic Acid   Result Value Ref Range    Lactic Acid 2.80 (H) 0.4 - 2.0 mmol/L   Urinalysis, Micro   Result Value Ref Range    WBC, UA 5-10 0 /hpf    RBC, UA 3-5 0 /hpf    Epithelial Cells UA 3-5 0 /hpf    BACTERIA, URINE 1+ (H) 0 /hpf    Casts 10-20 0 /lpf    Crystals 0 0 /LPF    Mucus, UA 0 0 /lpf    Other observations RESULTS VERIFIED MANUALLY     Lactic Acid   Result Value Ref Range    Lactic Acid 1.40 0.4 - 2.0 mmol/L   EKG 12 Lead   Result Value Ref Range    Ventricular Rate 103 BPM    Atrial Rate 103 BPM    P-R Interval 139 ms    QRS Duration 82 ms    Q-T Interval 375 ms    QTc Calculation (Bazett) 491 ms    P Axis 86 degrees    R Axis 56 degrees    T Axis 66 degrees    Diagnosis Sinus tachycardia         XR CHEST PORTABLE   Final Result      1. No acute cardiopulmonary process. CPT code(s) 12351                  CT HEAD WO CONTRAST   Final Result      1. Age-related senescent changes and chronic microvascular disease without   acute intracranial abnormality. CPT code 78912           NIH Stroke Scale  Interval: Baseline  Level of Consciousness (1a): Alert  LOC Questions (1b): Answers both correctly  LOC Commands (1c): Performs both tasks correctly  Best Gaze (2): Normal  Visual (3): No visual loss  Facial Palsy (4): Normal symmetrical movement  Motor Arm, Left (5a): No drift  Motor Arm, Right (5b): No drift  Motor Leg, Left (6a): No drift  Motor Leg, Right (6b): No drift  Limb Ataxia (7): Absent  Sensory (8): Normal  Best Language (9): No aphasia  Dysarthria (10): Normal  Extinction and Inattention (11): No abnormality  Total: 0            Voice dictation software was used during the making of this note. This software is not perfect and grammatical and other typographical errors may be present. This note has not been completely proofread for errors.        MARCELLO Mccracken - [facility]  03/13/23 6715

## 2023-03-13 NOTE — ED TRIAGE NOTES
Pt reports feeling dizzy and warm down her legs. Pt reports her BP medicine was just changed. Pt states her tongue and throat feel \"swollen and numb\" but no trouble breathing.

## 2023-03-13 NOTE — ED NOTES
I have reviewed discharge instructions with the patient. The patient verbalized understanding. Patient left ED via Discharge Method: ambulatory to Home with Family. Opportunity for questions and clarification provided. Patient given 1 scripts. To continue your aftercare when you leave the hospital, you may receive an automated call from our care team to check in on how you are doing. This is a free service and part of our promise to provide the best care and service to meet your aftercare needs.  If you have questions, or wish to unsubscribe from this service please call [phone]. Thank you for Choosing our [facility] Emergency Department.        Maye Davis RN  03/13/23 3209

## 2023-03-14 ENCOUNTER — TELEPHONE (OUTPATIENT)
Dept: CARDIOLOGY CLINIC | Age: 63
End: 2023-03-14

## 2023-03-14 ENCOUNTER — OFFICE VISIT (OUTPATIENT)
Dept: INTERNAL MEDICINE CLINIC | Facility: CLINIC | Age: 63
End: 2023-03-14
Payer: COMMERCIAL

## 2023-03-14 VITALS
BODY MASS INDEX: 20.6 KG/M2 | SYSTOLIC BLOOD PRESSURE: 104 MMHG | TEMPERATURE: 113 F | WEIGHT: 128.2 LBS | DIASTOLIC BLOOD PRESSURE: 60 MMHG | OXYGEN SATURATION: 98 % | HEIGHT: 66 IN | HEART RATE: 102 BPM

## 2023-03-14 DIAGNOSIS — I95.1 ORTHOSTATIC HYPOTENSION: Primary | Chronic | ICD-10-CM

## 2023-03-14 DIAGNOSIS — F17.219 NICOTINE DEPENDENCE, CIGARETTES, WITH UNSPECIFIED NICOTINE-INDUCED DISORDERS: ICD-10-CM

## 2023-03-14 DIAGNOSIS — E86.0 DEHYDRATION: ICD-10-CM

## 2023-03-14 DIAGNOSIS — J44.1 CHRONIC OBSTRUCTIVE PULMONARY DISEASE WITH ACUTE EXACERBATION (HCC): Chronic | ICD-10-CM

## 2023-03-14 PROCEDURE — 3078F DIAST BP <80 MM HG: CPT | Performed by: NURSE PRACTITIONER

## 2023-03-14 PROCEDURE — 3074F SYST BP LT 130 MM HG: CPT | Performed by: NURSE PRACTITIONER

## 2023-03-14 PROCEDURE — 99214 OFFICE O/P EST MOD 30 MIN: CPT | Performed by: NURSE PRACTITIONER

## 2023-03-14 SDOH — ECONOMIC STABILITY: HOUSING INSECURITY
IN THE LAST 12 MONTHS, WAS THERE A TIME WHEN YOU DID NOT HAVE A STEADY PLACE TO SLEEP OR SLEPT IN A SHELTER (INCLUDING NOW)?: NO

## 2023-03-14 SDOH — ECONOMIC STABILITY: INCOME INSECURITY: HOW HARD IS IT FOR YOU TO PAY FOR THE VERY BASICS LIKE FOOD, HOUSING, MEDICAL CARE, AND HEATING?: NOT HARD AT ALL

## 2023-03-14 SDOH — ECONOMIC STABILITY: FOOD INSECURITY: WITHIN THE PAST 12 MONTHS, THE FOOD YOU BOUGHT JUST DIDN'T LAST AND YOU DIDN'T HAVE MONEY TO GET MORE.: NEVER TRUE

## 2023-03-14 SDOH — ECONOMIC STABILITY: FOOD INSECURITY: WITHIN THE PAST 12 MONTHS, YOU WORRIED THAT YOUR FOOD WOULD RUN OUT BEFORE YOU GOT MONEY TO BUY MORE.: NEVER TRUE

## 2023-03-14 ASSESSMENT — PATIENT HEALTH QUESTIONNAIRE - PHQ9
SUM OF ALL RESPONSES TO PHQ QUESTIONS 1-9: 0
SUM OF ALL RESPONSES TO PHQ QUESTIONS 1-9: 0
2. FEELING DOWN, DEPRESSED OR HOPELESS: 0
SUM OF ALL RESPONSES TO PHQ QUESTIONS 1-9: 0
1. LITTLE INTEREST OR PLEASURE IN DOING THINGS: 0
SUM OF ALL RESPONSES TO PHQ9 QUESTIONS 1 & 2: 0
SUM OF ALL RESPONSES TO PHQ QUESTIONS 1-9: 0

## 2023-03-14 NOTE — TELEPHONE ENCOUNTER
Spoke with pt. Was in [facility] yesterday. Had near syncopal episode yesterday at daughter's house and another episode in [facility]. Her Cardizem was changed to Verapamil  120 mg daily for palpitations on 02-22-23. Bp in ER as follows:   84/58 pulse 116  94/58 pulse 90  99/66 pulse 81  112/92 pulse 82 after 2 bags of fluids. Also was found to have a UTI. Was put on antibiotic for this. Also stated one side of her tongue and throat felt numb yesterday. Felt a little better after the fluids. Has appointment with Macy Monroe NP with Dr. Genesis Almanzar at 10:15 today. Informed pt will review Christa's note before speaking with Dr. Carrie Linn and will return her call .   Pt voiced understanding./wc

## 2023-03-14 NOTE — TELEPHONE ENCOUNTER
Spoke with Dr. Mehran Kim. Try cutting the Verapamil in half and take 1/2 tab daily. If this does not reduce symptoms may need to go back to the Diltiazem 180 mg daily. Asked pt to keep a record  of bp and call back with report on Friday. Instructed pt to take bp around lunch time. Elevate arm on table. Sit about 10-15 minutes before taking bp. Pt voiced understanding of all instructions. Melany Hwang Unresponsive

## 2023-03-14 NOTE — TELEPHONE ENCOUNTER
----- Message from MARCELLO Payton CNP sent at 3/14/2023  3:07 PM EDT -----  Please discuss BP management with Dr. Chelsea Lopez - note from office visit today attached.     Arno Koyanagi, APRN - CNP

## 2023-03-14 NOTE — Clinical Note
Please discuss BP management with Dr. Gwenevere Gilford - note from office visit today attached.   MARCELLO Montero - CNP

## 2023-03-14 NOTE — PROGRESS NOTES
Lisseth Willams (: 1960) presents today for ER follow up. She was shopping in [facility] yesterday and started to get dizzy and felt like she was going to pass out. She was seen in ER, lactic acid was elevated and she was orthostatic hypotensive. Urine culture pending, but urinalysis abnormal. CT head and CXR unremarkable. She was given IV fluids and Rocephin; discharged on Bactrim. She is not drinking fluids well, she admits. She was recently changed from diltiazem 180mg to verapamil 120mg daily in February secondary to palpitations, which have ceased. She admits to not drinking enough fluids on a daily basis. She continues to smoke; reluctant to quit.     Chief Complaint   Patient presents with    Follow-up     ER follow up       Patient Active Problem List   Diagnosis    Dependence on continuous supplemental oxygen    Polycythemia    Chronic respiratory failure with hypoxia (HCC)    Tracheobronchitis    Memory changes    Vitamin D deficiency    Mixed simple and mucopurulent chronic bronchitis (HCC)    Palpitations    Cystitis    Diabetes ([address]    Irritable bowel syndrome    Chronic pain syndrome    Snoring    Chronic cough    Fibromyalgia    Essential hypertension    Tobacco use    Depression    Anxiety    Nicotine dependence, cigarettes, with unspecified nicotine-induced disorders    Angina pectoris (HCC)    ASCVD (arteriosclerotic cardiovascular disease)    COPD (chronic obstructive pulmonary disease) (HCC)    Bursitis    Liver disease    GERD (gastroesophageal reflux disease)    Mixed hyperlipidemia    Lung nodule    CVA (cerebral vascular accident) ([address]    Osteoarthritis    Degeneration of lumbar intervertebral disc    Insomnia    Pain in joint, pelvic region and thigh    Signs and symptoms involving cognition    Rheumatism    On statin therapy    Abnormal chest CT    Bronchiectasis without complication ([address]    Pulmonary infiltrates    Weight loss      Reviewed and updated this visit by provider:  Tobacco  Allergies  Meds  Problems  Med Hx  Surg Hx  Fam Hx       Immunizations:  Immunization status: up to date and documented.    Review of Systems - Negative except as stated in HPI  History obtained from significant other, chart review, and the patient  General ROS: positive for  - fatigue  negative for - fever  Respiratory ROS: positive for -  chronic dyspnea on exertion and cough  negative for - hemoptysis, stridor, tachypnea, or wheezing  Cardiovascular ROS: positive for - dyspnea on exertion  Negative for - chest pain, palpitations, edema  Gastrointestinal ROS: no abdominal pain, change in bowel habits, or black or bloody stools  Genito-Urinary ROS: no dysuria, trouble voiding, or hematuria    Vitals:    03/14/23 1028 03/14/23 1118 03/14/23 1119   BP: 138/78 110/64 104/60   Site: Right Upper Arm Right Upper Arm Right Upper Arm   Position: Sitting Sitting Sitting   Pulse: 98 (!) 102    Temp: 97.2 °F (36.2 °C)  (!) 113 °F (45 °C)   TempSrc: Temporal     SpO2: 98%     Weight: 128 lb 3.2 oz (58.2 kg)     Height: 5' 6\" (1.676 m)       Physical Examination: General appearance - alert, well appearing, and in no distress, oriented to person, place, and time, normal appearing weight, acyanotic, in no respiratory distress, and chronically ill appearing  Mental status - alert, oriented to person, place, and time, normal mood, behavior, speech, dress, motor activity, and thought processes, affect appropriate to mood  Ears - bilateral TM's and external ear canals normal  Nose - normal and patent, no erythema, discharge or polyps  Mouth - mucous membranes moist, pharynx normal without lesions  Neck - supple, no significant adenopathy  Chest - coarse breath sounds bilaterally; no wheezing  Heart - normal rate, regular rhythm, normal S1, S2, no murmurs, rubs, clicks or gallops  Extremities - peripheral pulses normal, no pedal edema, no clubbing or cyanosis    Assessment/Plan:  1. Orthostatic hypotension   2. Dehydration    3. Chronic obstructive pulmonary disease with acute exacerbation ([facility] [address]    4. Nicotine dependence, cigarettes, with unspecified nicotine-induced disorders      Blood and uriene cultures pending - will contact with results. For now, continue current medications. Add 4-8oz of Pedialyte daily as needed. Will defer to cardiology for HTN medication management. Smoking cessation discussed. Advised patient to quit and offered support. Follow up as scheduled with pulmonary on 03/17/2023. Follow up as scheduled and PRN.     Andrew Watson, MARCELLO - CNP

## 2023-03-14 NOTE — TELEPHONE ENCOUNTER
----- Message from Adelfo Hoover MA sent at 3/14/2023  8:15 AM EDT -----  Regarding: FW: Med issue    ----- Message -----  From: Klarissa Degroot MA  Sent: 3/13/2023   4:44 PM EDT  To: Adelfo Hoover MA  Subject: FW: Med issue                                      ----- Message -----  From: Javi Walls  Sent: 3/13/2023   4:42 PM EDT  To: [facility] Clinical Staff  Subject: Med issue                                        May need to adjust meds had to go to er doctor blood pressure dropped please check my chart for what I should do now

## 2023-03-15 ENCOUNTER — TELEPHONE (OUTPATIENT)
Dept: INTERNAL MEDICINE CLINIC | Facility: CLINIC | Age: 63
End: 2023-03-15

## 2023-03-15 NOTE — TELEPHONE ENCOUNTER
----- Message from MARCELLO Jimenes CNP sent at 3/15/2023  3:33 PM EDT -----  Regarding: FW: Check labs  Please let Frances Moore know the urine culture, blood culture and respiratory cultures from her ER visit are still negative for infection. She can discontinue the Bactrim.     MARCELLO Garcia CNP    ----- Message -----  From: MARCELLO Jimenes CNP  Sent: 3/15/2023  12:00 AM EDT  To: MARCELLO Jimenes CNP  Subject: Check labs

## 2023-03-16 LAB
BACTERIA SPEC CULT: NORMAL
SERVICE CMNT-IMP: NORMAL

## 2023-03-16 NOTE — PROGRESS NOTES
She is a 49-year-old female seen today for follow-up of severe hypoxia, tobacco use, abnormal chest CT with new 11 mm spiculated nodule in the RLL, coarse interstitial markings with bronchiectasis in the RLL, RML and to lesser degree in the lingula. DIAGNOSTICS:         Chest CT 6/2011. Chest CT 9/2011 - suggestion of minimal stable emphysematous change in the apices. 2 mm nodule in the RML along the minor fissure is unchanged. CXR's 12/2013. CXR 12/30/13 - reticular densities left lung base and periphery of right mid lung, may be areas of resolving consolidation. CXR 2/27/14 - clear lungs, no effusion or infiltrate. Spirometry 2/27/14 - Moderately severe obstruction, with low vital capacity. Spirometry 2/2015. CPFT's 4/21/15 - moderate airflow obstruction. There is improvement after bronchodilator. Lung volumes suggest mild air trapping. Diffusing capacity is severely reduced. Together this  would be consistent with moderate COPD, particularly with severe emphysema or complicating pulmonary vascular disease. Spirometry 11/23/2015 - severe obstructive defect with decreased FVC. Coughing during study, no significant change from 2/2015 but interval decline compared to 2014. Spirometry 9/16/2016 - severe obstructive defect, decreased FVC, no significant change. LDCT 9/2016 - Chronic lung changes, 2 tiny noncalcified granulomas on the right. Lung RADS category 2-benign. Recommendation-continue with annual LDCT in 12 months as long as the patient  remains a candidate for screening at that time. PA and lateral chest x-ray 6/8/2017-increased streaky bibasilar atelectasis and/or consolidation. LDCT 9/25/2017-stable small calcified granulomas in the right upper lobe. No new pulmonary nodules. Spirometry 6/22/2012-severe obstructive defect with decreased FVC. No significant interval change. Chest CT 9/2018-stable small calcified nodules on the right lung.   There are several new tiny nodules scattered bilaterally, indeterminate. Felt to possibly represent  mild atypical pneumonia. Spirometry 12/24/2018- severe obstructive defect with decreased FVC. Interval decline in FVC, no significant change in FEV1. Chest CT 9/30/2019-interval resolution of previously new soft tissue nodule, suggesting a benign/inflammatory etiology. Spirometry 10/24/2019-severe obstructive defect with decreased FVC, no significant change. CT C/A/P-3/21/2020- negative for mediastinal, hilar adenopathy.  + Moderate emphysema. Perihilar bronchial wall thickening with endobronchial debris extending  into the RLL without consolidation, likely sequela of aspiration. 3 vague groundglass nodules at RLL which are new from previous exam, measuring up to 6 mm. Follow-up CT recommended in 3 months. Echo 3/19/2020-EF 55-60%. No right to left shunt. No evidence for pulmonary hypertension. CT 6/19/2020 - decrease in size of nodules, largest of 6 mm decreased to 4 mm. No new nodules. Emphysematous changes. Spirometry 3/22/2021-severe obstructive defect, no significant change. LDCT 6/21/2021 which demonstrated stable small pulmonary nodules, including spiculated 3 mm nodule GLENNY. Small calcified granulomas were also stable. Screening CT is recommended in 12 months. Spirometry 5/9/2022-severe obstructive defect with decreased FVC, interval improvement in FVC, trend toward worsening in FEV1. Chest CT 3/7/2023 -interval improvement in infiltrates involving the lingula, RML and right lung base. Right lung base nodule has diminished in size, now 0.7 cm, likely infectious or inflammatory in nature. GLENNY nodule is stable and likely an area of scarring. No new or enlarging lesions. Emphysema. No adenopathy. CXR 3/13/2023-no acute cardiopulmonary process.

## 2023-03-17 ENCOUNTER — TELEPHONE (OUTPATIENT)
Dept: CARDIOLOGY CLINIC | Age: 63
End: 2023-03-17

## 2023-03-17 ENCOUNTER — OFFICE VISIT (OUTPATIENT)
Dept: PULMONOLOGY | Age: 63
End: 2023-03-17

## 2023-03-17 VITALS
HEART RATE: 116 BPM | BODY MASS INDEX: 19.93 KG/M2 | TEMPERATURE: 97 F | SYSTOLIC BLOOD PRESSURE: 118 MMHG | HEIGHT: 66 IN | OXYGEN SATURATION: 95 % | WEIGHT: 124 LBS | DIASTOLIC BLOOD PRESSURE: 74 MMHG

## 2023-03-17 DIAGNOSIS — F17.219 NICOTINE DEPENDENCE, CIGARETTES, WITH UNSPECIFIED NICOTINE-INDUCED DISORDERS: ICD-10-CM

## 2023-03-17 DIAGNOSIS — R91.8 LUNG NODULES: ICD-10-CM

## 2023-03-17 DIAGNOSIS — J47.9 BRONCHIECTASIS WITHOUT COMPLICATION (HCC): ICD-10-CM

## 2023-03-17 DIAGNOSIS — J96.11 CHRONIC RESPIRATORY FAILURE WITH HYPOXIA (HCC): ICD-10-CM

## 2023-03-17 DIAGNOSIS — R93.89 ABNORMAL CHEST CT: Primary | ICD-10-CM

## 2023-03-17 DIAGNOSIS — J44.9 CHRONIC OBSTRUCTIVE PULMONARY DISEASE, UNSPECIFIED COPD TYPE (HCC): ICD-10-CM

## 2023-03-17 DIAGNOSIS — R91.1 LUNG NODULE: ICD-10-CM

## 2023-03-17 RX ORDER — BUDESONIDE 0.5 MG/2ML
INHALANT ORAL
Qty: 60 EACH | Refills: 11 | Status: SHIPPED | OUTPATIENT
Start: 2023-03-17

## 2023-03-17 RX ORDER — IPRATROPIUM BROMIDE AND ALBUTEROL SULFATE 2.5; .5 MG/3ML; MG/3ML
3 SOLUTION RESPIRATORY (INHALATION) 4 TIMES DAILY
Qty: 360 ML | Refills: 11 | Status: SHIPPED | OUTPATIENT
Start: 2023-03-17

## 2023-03-17 RX ORDER — ALBUTEROL SULFATE 90 UG/1
2 AEROSOL, METERED RESPIRATORY (INHALATION) EVERY 6 HOURS PRN
Qty: 18 G | Refills: 11 | Status: SHIPPED | OUTPATIENT
Start: 2023-03-17

## 2023-03-17 ASSESSMENT — ENCOUNTER SYMPTOMS
SPUTUM PRODUCTION: 1
WHEEZING: 0
HEMOPTYSIS: 0
SHORTNESS OF BREATH: 1
COUGH: 1

## 2023-03-17 NOTE — TELEPHONE ENCOUNTER
SEE TELEPHONE ENCOUNTER 3/14/2023 & Office Visit (Internal Med) 3/14/2023.    Patient's Diltiazem was changed to Verapamil due to palpitations.  BP dropped, patient treated in ER.  Dr. Watson advised cut Verapamil in 1/2, keep BP log, check in Friday (today 3/17/2023) with BP log and status.    Called home number s/w Ms. Serrano.  She is feeling significantly better on the 1/2 dose Verapamil.  She denies palpitations, denies malaise/weakness/dizziness.      Advised for now remain on 1/2 dose Verapamil.  Will review with Dr. Watson on Monday for further instructions.  Advised continue to monitor BP (does not have to be daily, could be 2-3 times per week).  Call office if symptoms change.    Ms. Serrano is agreeable to plan.  We will call with Dr. Watson's instructions./vivib

## 2023-03-17 NOTE — PROGRESS NOTES
[address] [facility]. [address], [address] [facility]  [phone]    Patient Name:  Josi Gunderson    YOB: 1960    Office Visit 3/17/2023      CHIEF COMPLAINT:      Chief Complaint   Patient presents with    Follow-up    Other     hypoxia         ASSESSMENT:   (Medical Decision Making)                                                                                                                                          Encounter Diagnoses   Name Primary? Abnormal chest CT Yes    Lung nodule     Chronic obstructive pulmonary disease, unspecified COPD type (HCC)     Chronic respiratory failure with hypoxia (HCC)     Bronchiectasis without complication (HCC)     Nicotine dependence, cigarettes, with unspecified nicotine-induced disorders      Chest CT 2/2023 demonstrated new 11 mm spiculated nodule in the RLL, interval development of coarse interstitial markings with bronchiectasis in the RLL, RML and to lesser degree in the lingula. Follow-up CT is demonstrated interval decrease in size of RLL nodule as well as interval improving infiltrate involving the lingula, RML and right lung base. Stable GLENNY nodule. Centrilobular emphysema is again noted. Interval improvement symptomatically. She is compliant with prescribed regimen for COPD. She is compliant with supplemental oxygen. Adequate saturation on 2 L/min. Unfortunately, she continues to smoke. AFB smear negative, final culture pending. Routine culture with moderate normal respiratory shonda. PLAN:     DuoNeb via nebulizer 4 times daily, budesonide twice daily, rinse mouth after use. Albuterol inhaler when away from home/nebulizer. OTC mucolytic, (mucinex or generic equivalent of guaifenesin), 2400mg in 24 hours in divided doses as needed to thin mucous. Tessalon Perles refilled at her request for cough suppression.     Continue Protonix with sleep as prescribed. Continue to elevate head of bed, avoid eating or drinking within 1 hour of lying down. Refrain from spicy or acidic foods and limit caffeine and mints. Boost or Ensure supplements, multivitamin daily. Chest CT in September for follow-up of lung nodules. Appointment 1 to 2 weeks after CT with spirometry. I will call her with final AFB culture result once result is available. Orders Placed This Encounter   Procedures    CT CHEST WO CONTRAST     Standing Status:   Future     Standing Expiration Date:   3/17/2024     Order Specific Question:   Reason for exam:     Answer:   follow up lung nodules, tobacco use         Orders Placed This Encounter   Medications    albuterol sulfate HFA (PROVENTIL;VENTOLIN;PROAIR) 108 (90 Base) MCG/ACT inhaler     Sig: Inhale 2 puffs into the lungs every 6 hours as needed for Wheezing 2 puffs 4 times daily prn     Dispense:  18 g     Refill:  11    ipratropium-albuterol (DUONEB) 0.5-2.5 (3) MG/3ML SOLN nebulizer solution     Sig: Take 3 mLs by nebulization 4 times daily     Dispense:  360 mL     Refill:  11    budesonide (PULMICORT) 0.5 MG/2ML nebulizer suspension     Si vial via nebulizer twice daily, rinse mouth after use. Dispense:  60 each     Refill:  [facility]    Total  time spent with patient - 36 min. Collaborating MD: Dr. Irving Cid:    She is a 49-year-old female seen today for follow-up of severe hypoxia, tobacco use, abnormal chest CT with new 11 mm spiculated nodule in the RLL, coarse interstitial markings with bronchiectasis in the RLL, RML and to lesser degree in the lingula. Chest CT 3/7/2023 -interval improvement in infiltrates involving the lingula, RML and right lung base. Right lung base nodule has diminished in size, now 0.7 cm, likely infectious or inflammatory in nature. GLENNY nodule is stable and likely an area of scarring. No new or enlarging lesions. Emphysema. No adenopathy. Today, she reports interval improvement in shortness of breath. She denies wheezing. She has her usual chronic cough with milky sputum. She denies purulent sputum, hemoptysis, fever, chills or night sweats. She reports compliance with DuoNeb via nebulizer 4 times daily and Pulmicort twice daily. She is compliant with supplemental oxygen. Unfortunately, she continues to smoke. At her last visit, she was provided 3 specimen cups for cultures with AFB. For unclear reasons, the lab processed only one of the specimens. Thus far, AFB smears negative, final culture is pending. DIAGNOSTICS:         Chest CT 6/2011. Chest CT 9/2011 - suggestion of minimal stable emphysematous change in the apices. 2 mm nodule in the RML along the minor fissure is unchanged. CXR's 12/2013. CXR 12/30/13 - reticular densities left lung base and periphery of right mid lung, may be areas of resolving consolidation. CXR 2/27/14 - clear lungs, no effusion or infiltrate. Spirometry 2/27/14 - Moderately severe obstruction, with low vital capacity. Spirometry 2/2015. CPFT's 4/21/15 - moderate airflow obstruction. There is improvement after bronchodilator. Lung volumes suggest mild air trapping. Diffusing capacity is severely reduced. Together this  would be consistent with moderate COPD, particularly with severe emphysema or complicating pulmonary vascular disease. Spirometry 11/23/2015 - severe obstructive defect with decreased FVC. Coughing during study, no significant change from 2/2015 but interval decline compared to 2014. Spirometry 9/16/2016 - severe obstructive defect, decreased FVC, no significant change. LDCT 9/2016 - Chronic lung changes, 2 tiny noncalcified granulomas on the right. Lung RADS category 2-benign. Recommendation-continue with annual LDCT in 12 months as long as the patient  remains a candidate for screening at that time.    PA and lateral chest x-ray 6/8/2017-increased streaky bibasilar atelectasis and/or consolidation. LDCT 9/25/2017-stable small calcified granulomas in the right upper lobe. No new pulmonary nodules. Spirometry 6/22/2012-severe obstructive defect with decreased FVC. No significant interval change. Chest CT 9/2018-stable small calcified nodules on the right lung. There are several new tiny nodules scattered bilaterally, indeterminate. Felt to possibly represent  mild atypical pneumonia. Spirometry 12/24/2018- severe obstructive defect with decreased FVC. Interval decline in FVC, no significant change in FEV1. Chest CT 9/30/2019-interval resolution of previously new soft tissue nodule, suggesting a benign/inflammatory etiology. Spirometry 10/24/2019-severe obstructive defect with decreased FVC, no significant change. CT C/A/P-3/21/2020- negative for mediastinal, hilar adenopathy.  + Moderate emphysema. Perihilar bronchial wall thickening with endobronchial debris extending  into the RLL without consolidation, likely sequela of aspiration. 3 vague groundglass nodules at RLL which are new from previous exam, measuring up to 6 mm. Follow-up CT recommended in 3 months. Echo 3/19/2020-EF 55-60%. No right to left shunt. No evidence for pulmonary hypertension. CT 6/19/2020 - decrease in size of nodules, largest of 6 mm decreased to 4 mm. No new nodules. Emphysematous changes. Spirometry 3/22/2021-severe obstructive defect, no significant change. LDCT 6/21/2021 which demonstrated stable small pulmonary nodules, including spiculated 3 mm nodule GLENNY. Small calcified granulomas were also stable. Screening CT is recommended in 12 months. Spirometry 5/9/2022-severe obstructive defect with decreased FVC, interval improvement in FVC, trend toward worsening in FEV1. Chest CT 6/22/2022-stable area of nodular scarring in GELNNY. New area of questionable infiltrate or atelectasis RUL.   CXR 1/20/2023-hyperexpanded lungs, no acute abnormality. Chest CT 2/7/2023-stable small GLENNY nodule. New coarse interstitial markings in the RML, RLL and lingula. Associated bronchiectasis. New 11 mm spiculated nodule in RLL. Sputum culture/AFB 2/13/2023-moderate normal respiratory shonda; AFB smear negative. Awaiting final culture result. Chest CT 3/7/2023 -interval improvement in infiltrates involving the lingula, RML and right lung base. Right lung base nodule has diminished in size, now 0.7 cm, likely infectious or inflammatory in nature. GLENNY nodule is stable and likely an area of scarring. No new or enlarging lesions. Emphysema. No adenopathy. CXR 3/13/2023-no acute cardiopulmonary process. _____________________________________________________________      REVIEW OF SYSTEMS:    Review of Systems   Constitutional: Positive for weight loss. Negative for chills, fever and night sweats. Cardiovascular:  Negative for chest pain. Respiratory:  Positive for cough, shortness of breath and sputum production. Negative for hemoptysis and wheezing. PHYSICAL EXAM:    Vitals:    03/17/23 1043   BP: 118/74   Pulse: (!) 116   Temp: 97 °F (36.1 °C)   TempSrc: Skin   SpO2: 95%   Weight: 124 lb (56.2 kg)   Height: 5' 6\" (1.676 m)        Body mass index is 20.01 kg/m². GENERAL APPEARANCE:  The patient is normal weight and in no respiratory distress. HEENT:  PERRL. Conjunctivae unremarkable. NECK/LYMPHATIC:   Symmetrical with no elevation of jugular venous pulsation. Trachea midline. LUNGS:   Normal respiratory effort with symmetrical lung expansion. Breath sounds-decreased but overall are clear. No crackles. [place] HEART:   There is a normal rate and regular rhythm. No murmur, rub, or gallop. There is no edema in the lower extremities. NEURO:  The patient is alert and oriented to person, place, and time. Memory appears intact and mood is normal.  No gross sensorimotor deficits are present.       DIAGNOSTIC TESTS: Studies were personally reviewed by me and discussed with the patient. CXR:      XR CHEST (2 VW) 01/20/2023      Findings: 2 views of the chest submitted demonstrate the cardiac silhouette and  mediastinum to be unremarkable. There is no pleural effusion or pneumothorax. The lungs are hyperexpanded but otherwise clear. The bones are unremarkable. Impression  Hyperexpanded lungs, otherwise no acute abnormality. CT WITHOUT CONTRAST:    CT CHEST WO CONTRAST 03/07/2023    FINDINGS:    LUNGS AND PLEURA: Centrilobular emphysema. Left upper lobe nodule measures 0.5  cm, unchanged. Right lower lobe nodule adjacent to the major fissure now  measures 0.7 cm on image 196, previously 1.1 cm and now appears more curvilinear  in appearance likely resolving infectious or inflammatory focus. Areas of  architectural distortion and scarring noted at the inferior medial lingula and  right middle lobe along with the right lung base, likely sequela from previous  infection or inflammatory process. Overall, findings appear improved since prior  CT. No pleural effusions. MEDIASTINUM/AXILLAE: The heart is normal in size. No pericardial effusion. Moderate coronary artery calcification. Esophagus and thyroid gland are  unremarkable. No enlarged lymph nodes. UPPER ABDOMEN: Cholecystectomy. MUSCULOSKELETAL: Degenerative spine changes. Impression  1. Interval improving infiltrate involving the lingula, right middle lobe and  right lung base. Right lung base nodule has diminished in size likely infectious  or inflammatory in nature. 2. Subcentimeter left upper lobe lung nodule is stable possibly an area of  nodular scarring. No new or enlarging lung lesions. 3. Emphysema. Coronary artery calcifications. No enlarged lymph nodes.     CT of chest  3/7/2023 c/w 2/7/2023          Past Medical History:   Diagnosis Date    Acute renal failure ([address] 12/5/2013    Baseline creatinine was 0.8, and currently it is 2.9     Acute respiratory failure ([address] 12/5/2013    Adverse effect of anesthesia     difficulty waking up    Arthritis     Back pain     CAD (coronary artery disease)     CAP (community acquired pneumonia) 12/7/2013    COPD exacerbation ([address] 6/8/2017    Oxygen at 3lpm continuously    CVA (cerebral vascular accident) ([address] 03/19/2020    left MCA with right sided weakness- S/P TPA    Cystitis     Diabetes ([address]     Difficult intubation 2005    with spine surgery at [facility]    Hyperlipidemia     on statin    Hypertension     Hypoproteinemia ([address] 12/11/2013    Hypotension, unspecified 12/5/2013    Hypoxemia 12/20/2013    follow up overnight oximetry on room air 3/2014 - resolved.     Ill-defined condition     hx of IC     Migraine headache     Myalgia     Pleural effusion 12/17/2013    Left: 450 ml of fluid was obtained       Positive occult stool blood test 12/9/2013    Psychiatric disorder     Respiratory failure ([address] 12/2013    required intubation    Sepsis ([address] 12/7/2013    Septicemia ([address] Dec 2013    no BP, pneumonia, \"Everything was failing\"- on vent 12/6-12/17    Unspecified adverse effect of anesthesia     took a long time to wake up 2005    Upper GI bleed 12/10/2013    GI evaluated        Patient Active Problem List   Diagnosis    Dependence on continuous supplemental oxygen    Polycythemia    Chronic respiratory failure with hypoxia (HCC)    Tracheobronchitis    Memory changes    Vitamin D deficiency    Mixed simple and mucopurulent chronic bronchitis (HCC)    Palpitations    Cystitis    Diabetes (HCC)    Irritable bowel syndrome    Chronic pain syndrome    Snoring    Chronic cough    Fibromyalgia    Essential hypertension    Tobacco use    Depression    Anxiety    Nicotine dependence, cigarettes, with unspecified nicotine-induced disorders    Angina pectoris (HCC)    ASCVD (arteriosclerotic cardiovascular disease)    COPD (chronic obstructive pulmonary disease) (HCC)    Bursitis    Liver disease    GERD (gastroesophageal reflux disease)    Mixed hyperlipidemia    Lung nodule    CVA (cerebral vascular accident) ([address]    Osteoarthritis    Degeneration of lumbar intervertebral disc    Insomnia    Pain in joint, pelvic region and thigh    Signs and symptoms involving cognition    Rheumatism    On statin therapy    Abnormal chest CT    Bronchiectasis without complication ([address]    Pulmonary infiltrates    Weight loss       Past Surgical History:   Procedure Laterality Date    CARDIAC CATHETERIZATION  2011    Mild CAD per Dr Annemarie Bender  12/29/2016    LAD:30% stenosis with \"sluggish flow\",Dx:30% stenosis. LCX OM:40-50% stenosis with - FFR,Small accesssory OM:70% ostial stenosis in 1 mmvessel,and RCA:30% diffuse CAD. CARDIAC CATHETERIZATION  07/15/2019    LV:EF55-60%. LM:nl. LAD:<30% stenosis. mLCX:50-60% stenosis. mRCA:60% stenosis with normal IFR (1.0) and irregularities    CHOLECYSTECTOMY      HYSTERECTOMY (CERVIX STATUS UNKNOWN)      hyesterectomy    NEUROLOGICAL SURGERY  2000 & 2005    spine X 4    ORTHOPEDIC SURGERY Right 10/2014    elbow    ORTHOPEDIC SURGERY      4 back surgeries         Social History     Socioeconomic History    Marital status:      Spouse name: None    Number of children: None    Years of education: None    Highest education level: None   Tobacco Use    Smoking status: Every Day     Packs/day: 1.00     Types: Cigarettes    Smokeless tobacco: Never    Tobacco comments:     Quit smoking: currently smoking 1/2 to 3/4  ppd   Substance and Sexual Activity    Alcohol use: Yes     Alcohol/week: 0.0 standard drinks    Drug use: No   Social History Narrative    Pt is  and lives with her . She on disability.       Social Determinants of Health     Financial Resource Strain: Low Risk     Difficulty of Paying Living Expenses: Not hard at all   Food Insecurity: No Food Insecurity    Worried About [address] [employer] in the Last Year: Never true    [address] in the Last Year: Never true   Transportation Needs: Unknown    Lack of Transportation (Non-Medical): No   Housing Stability: Unknown    Unstable Housing in the Last Year: No       Family History   Problem Relation Age of Onset    Cancer Mother         colon    Alzheimer's Disease Mother     Heart Disease Father        Allergies   Allergen Reactions    Latex Other (See Comments)    Moxifloxacin Other (See Comments), Swelling and Shortness Of Breath    Umeclidinium-Vilanterol Other (See Comments)     Chest pain    Egg White (Egg Protein) Itching     Itching with some tongue swelling and nausea    Banana Swelling and Other (See Comments)    Citrullus Vulgaris Swelling    Diazepam Other (See Comments)     angry    Pseudoephedrine Other (See Comments)    Pseudoephedrine Hcl Palpitations       Current Outpatient Medications   Medication Sig    OXYGEN Inhale into the lungs 2 lpm O2    verapamil (CALAN SR) 120 MG extended release tablet Take 1 tablet by mouth daily    traMADol (ULTRAM) 50 MG tablet Take 1 tablet by mouth every 8 hours as needed for Pain for up to 120 days. Max Daily Amount: 150 mg (Patient taking differently: Take 50 mg by mouth daily.)    pantoprazole (PROTONIX) 40 MG tablet Take 1 tablet by mouth at bedtime    ibuprofen (ADVIL;MOTRIN) 200 MG tablet Take 200 mg by mouth    atorvastatin (LIPITOR) 40 MG tablet TAKE 1 TABLET BY MOUTH EVERY DAY    metFORMIN (GLUCOPHAGE) 500 MG tablet TAKE TABLET BY MOUTH DAILY WITH BREAKFAST    clopidogrel (PLAVIX) 75 MG tablet TAKE 1 TABLET BY MOUTH DAILY.  INDICATIONS: PREVENTION FOR A BLOOD CLOT GOING TO THE BRAIN    albuterol sulfate  (90 Base) MCG/ACT inhaler 2 puffs 4 times daily prn    aspirin 81 MG EC tablet Take by mouth daily    budesonide (PULMICORT) 0.5 MG/2ML nebulizer suspension Inhale 500 mcg into the lungs 2 times daily    Cholecalciferol 50 MCG (2000 UT) TABS Take by mouth daily    citalopram (CELEXA) 40 MG tablet TAKE 1 TABLET BY MOUTH EVERY DAY    ipratropium-albuterol (DUONEB) 0.5-2.5 (3) MG/3ML SOLN nebulizer solution 1 vial via nebulizer qid. Indications: bronchi muscle spasm resulting from COPD    isosorbide mononitrate (IMDUR) 120 MG extended release tablet 1 tablet daily    meclizine (ANTIVERT) 25 MG tablet Take 25 mg by mouth 3 times daily as needed    nitroGLYCERIN (NITROSTAT) 0.4 MG SL tablet Place 1 sl under the tongue q 5 min prn cp, max 3 sl in a 15-min time period. Call 911 if no relief after the 3rd sl.    sulfamethoxazole-trimethoprim (BACTRIM DS) 800-160 MG per tablet Take 1 tablet by mouth 2 times daily for 5 days (Patient not taking: Reported on 3/17/2023)     No current facility-administered medications for this visit. Over 50% of today's office visit was spent in face to face time reviewing test results, prognosis, importance of compliance, education about disease process, benefits of medications, instructions for management of acute symptoms, and follow up plans. Electronically signed. Dictated using voice recognition software.   Proof read but unrecognized errors may exist.

## 2023-03-17 NOTE — TELEPHONE ENCOUNTER
Has kept up with BP readings per Dr Caio Haq  And was told to call Carolina Carmichael back but she is not here, Citlaly Mooney was in triage when patient talked to her  the other day   Tuesday night 134/56  p 96  Wed AM   137/71    p 110  Wed afternoom  107/58   p115  Wed night 122/90 p  112  Thurs AM  138/77   p 108  Thurs afternoon    110/67   p 103  Thurs night   120/66   p 102  Friday   AM     141/80  p 109  118/74     pulse 118      Pulmonary office

## 2023-03-17 NOTE — PATIENT INSTRUCTIONS
DuoNeb via nebulizer 4 times daily, budesonide twice daily, rinse mouth after use. Albuterol inhaler when away from home/nebulizer. OTC mucolytic, (mucinex or generic equivalent of guaifenesin), 2400mg in 24 hours in divided doses as needed to thin mucous. Tessalon Perles refilled at her request for cough suppression. Continue Protonix with sleep as prescribed. Continue to elevate head of bed, avoid eating or drinking within 1 hour of lying down. Refrain from spicy or acidic foods and limit caffeine and mints. Boost or Ensure supplements, multivitamin daily. Chest CT in September for follow-up of lung nodules. Appointment 1 to 2 weeks after CT with spirometry.

## 2023-03-22 NOTE — TELEPHONE ENCOUNTER
Dr. Doug Garcia agreed with plan, remain on 1/2 dose Verapamil. Called MsEdith Torrey Soto, reviewed above. She will continue on 1/2 dose Verapamil. She notes cutting the pill in half is working well for her.   If she needs a lower dose called in she will contact the office./smb

## 2023-05-01 ENCOUNTER — TRANSCRIBE ORDERS (OUTPATIENT)
Dept: SCHEDULING | Age: 63
End: 2023-05-01

## 2023-05-01 DIAGNOSIS — Z12.31 SCREENING MAMMOGRAM FOR HIGH-RISK PATIENT: Primary | ICD-10-CM

## 2023-05-08 ENCOUNTER — TELEPHONE (OUTPATIENT)
Dept: PULMONOLOGY | Age: 63
End: 2023-05-08

## 2023-05-08 RX ORDER — PREDNISONE 20 MG/1
TABLET ORAL
Qty: 15 TABLET | Refills: 0 | Status: SHIPPED | OUTPATIENT
Start: 2023-05-08

## 2023-05-08 RX ORDER — BENZONATATE 200 MG/1
CAPSULE ORAL
Qty: 60 CAPSULE | Refills: 0 | Status: SHIPPED | OUTPATIENT
Start: 2023-05-08

## 2023-05-08 RX ORDER — AZITHROMYCIN 250 MG/1
TABLET, FILM COATED ORAL
Qty: 6 TABLET | Refills: 0 | Status: SHIPPED | OUTPATIENT
Start: 2023-05-08

## 2023-05-08 NOTE — TELEPHONE ENCOUNTER
TRIAGE CALL      Complaint: Coughing  Cough: yes  Productive:  yes  Bloody Sputum:  no  Increased SOB/Wheezing:  yes  Duration: 10 days  Fever/Chills: yes  OTC Meds tried: allergy medicine

## 2023-05-08 NOTE — TELEPHONE ENCOUNTER
I have notified patient of prescriptions per Mrs Inez Mata. She will call back if no improvement for an appointment. NASAL CONGESTION

## 2023-05-08 NOTE — TELEPHONE ENCOUNTER
Smoking cessation. I sent in rx for zpak, prednisone taper and tessalon perles. Visit if fails to respond to above.     thanks

## 2023-05-08 NOTE — TELEPHONE ENCOUNTER
LOV 3/17/2023 Mrs Paige-hypoxia, abnormal Ct chest, pulmonary nodule, COPD, bronchiectasis, nicotine dependence, O2    Duoneb, Albuterol inhaler, Mucinex, Tessalon Perles, Protonix    I have spoken with patient. Taking Ibuprofen alternating Tylenol for perceived fever. Cannot find thermometer. Cannot find SpO2 monitor. Is on 2 lpm.  A little more SOB than usual.  She is coughing up secretions brown in color. She reports started 10-14 days ago. Reports started as allergies but settled in her chest.  She reports intermittent wheezing that has gotten some better. Coughing to point of throwing up. She has taken last of Tessalon Perles and reports they do help. Pulmicort BID and Duoneb QID. She is taking Albuterol inhaler additional 2 x daily. She reports that she cannot take Mucinex due to increased cough. She reports you are aware of this. Mrs Paige-patient reports working on hydration. She is asking for Prednisone, antibiotic and more tessalon Perles. Please advise.

## 2023-05-24 ENCOUNTER — HOSPITAL ENCOUNTER (OUTPATIENT)
Dept: MAMMOGRAPHY | Age: 63
Discharge: HOME | End: 2023-05-27
Payer: COMMERCIAL

## 2023-05-24 DIAGNOSIS — Z12.31 SCREENING MAMMOGRAM FOR HIGH-RISK PATIENT: ICD-10-CM

## 2023-05-24 PROCEDURE — 77067 SCR MAMMO BI INCL CAD: CPT

## 2023-05-25 ENCOUNTER — OFFICE VISIT (OUTPATIENT)
Age: 63
End: 2023-05-25
Payer: COMMERCIAL

## 2023-05-25 VITALS
SYSTOLIC BLOOD PRESSURE: 110 MMHG | HEIGHT: 66 IN | DIASTOLIC BLOOD PRESSURE: 62 MMHG | BODY MASS INDEX: 19.93 KG/M2 | WEIGHT: 124 LBS | HEART RATE: 100 BPM

## 2023-05-25 DIAGNOSIS — R00.2 PALPITATIONS: ICD-10-CM

## 2023-05-25 DIAGNOSIS — I25.10 ASCVD (ARTERIOSCLEROTIC CARDIOVASCULAR DISEASE): Primary | ICD-10-CM

## 2023-05-25 PROCEDURE — 99214 OFFICE O/P EST MOD 30 MIN: CPT | Performed by: INTERNAL MEDICINE

## 2023-05-25 PROCEDURE — 3074F SYST BP LT 130 MM HG: CPT | Performed by: INTERNAL MEDICINE

## 2023-05-25 PROCEDURE — 3078F DIAST BP <80 MM HG: CPT | Performed by: INTERNAL MEDICINE

## 2023-05-25 ASSESSMENT — ENCOUNTER SYMPTOMS
BLURRED VISION: 0
WHEEZING: 0
HEMATOCHEZIA: 0
SHORTNESS OF BREATH: 0
DIARRHEA: 0
ORTHOPNEA: 0
VOMITING: 0
HEMATEMESIS: 0
COLOR CHANGE: 0
HOARSE VOICE: 0
SPUTUM PRODUCTION: 0
BOWEL INCONTINENCE: 0
COUGH: 0
ABDOMINAL PAIN: 0
NAUSEA: 0

## 2023-05-25 NOTE — PROGRESS NOTES
[facility] CARDIOLOGY  [address], [address]  PHONE: [phone]        23        NAME:  Erick Bah  : 1960  MRN: [id number]       SUBJECTIVE:   Erick Bah is a 61 y.o. female seen for a follow up visit regarding the following: The patient has a hx of CAD,CVA,primary hypertension, PSVT,and severe COPD. On her last visit,Diltiazem was switched to Verapamil due to PSVT. She reduced Verapamil dose due to hypotension. Chief Complaint   Patient presents with    3 Month Follow-Up    Palpitations    Hypertension       HPI:    Palpitations   This is a chronic problem. The problem has been gradually improving. Nothing aggravates the symptoms. Pertinent negatives include no anxiety, chest fullness, chest pain, coughing, diaphoresis, dizziness, fever, irregular heartbeat, malaise/fatigue, nausea, near-syncope, numbness, shortness of breath, syncope, vomiting or weakness. Hypertension  Associated symptoms include palpitations. Pertinent negatives include no blurred vision, chest pain, malaise/fatigue, orthopnea, PND or shortness of breath. Past Medical History, Past Surgical History, Family history, Social History, and Medications were all reviewed with the patient today and updated as necessary. Current Outpatient Medications:     predniSONE (DELTASONE) 20 MG tablet, 2 po q am pc for 3 days, 1 and 1/2 for 3 days, 1 for 3 days, 1/2 for 3 days, then stop or as directed. (Patient taking differently: as needed 2 po q am pc for 3 days, 1 and 1/2 for 3 days, 1 for 3 days, 1/2 for 3 days, then stop or as directed.), Disp: 15 tablet, Rfl: 0    benzonatate (TESSALON) 200 MG capsule, 1 po tid prn cough, Disp: 60 capsule, Rfl: 0    clopidogrel (PLAVIX) 75 MG tablet, TAKE 1 TABLET BY MOUTH DAILY.  INDICATIONS: PREVENTION FOR A BLOOD CLOT GOING TO THE BRAIN, Disp: 90 tablet, Rfl: 2    albuterol sulfate HFA (PROVENTIL;VENTOLIN;PROAIR) 108 (90 Base) MCG/ACT inhaler, Inhale 2

## 2023-07-17 ENCOUNTER — OFFICE VISIT (OUTPATIENT)
Dept: INTERNAL MEDICINE CLINIC | Facility: CLINIC | Age: 63
End: 2023-07-17
Payer: COMMERCIAL

## 2023-07-17 VITALS
BODY MASS INDEX: 19.7 KG/M2 | DIASTOLIC BLOOD PRESSURE: 60 MMHG | SYSTOLIC BLOOD PRESSURE: 90 MMHG | HEIGHT: 66 IN | WEIGHT: 122.6 LBS

## 2023-07-17 DIAGNOSIS — E11.9 TYPE 2 DIABETES MELLITUS WITHOUT COMPLICATION, WITHOUT LONG-TERM CURRENT USE OF INSULIN (HCC): Primary | ICD-10-CM

## 2023-07-17 DIAGNOSIS — R10.11 RIGHT UPPER QUADRANT PAIN: ICD-10-CM

## 2023-07-17 DIAGNOSIS — Z79.899 ON STATIN THERAPY: ICD-10-CM

## 2023-07-17 DIAGNOSIS — E86.1 HYPOTENSION DUE TO HYPOVOLEMIA: ICD-10-CM

## 2023-07-17 DIAGNOSIS — E11.9 TYPE 2 DIABETES MELLITUS WITHOUT COMPLICATION, WITHOUT LONG-TERM CURRENT USE OF INSULIN (HCC): ICD-10-CM

## 2023-07-17 DIAGNOSIS — I10 ESSENTIAL HYPERTENSION: ICD-10-CM

## 2023-07-17 DIAGNOSIS — I95.89 HYPOTENSION DUE TO HYPOVOLEMIA: ICD-10-CM

## 2023-07-17 DIAGNOSIS — Z12.11 SCREENING FOR COLON CANCER: ICD-10-CM

## 2023-07-17 LAB
ALBUMIN SERPL-MCNC: 4.2 G/DL (ref 3.2–4.6)
ALBUMIN/GLOB SERPL: 1 (ref 0.4–1.6)
ALP SERPL-CCNC: 92 U/L (ref 50–136)
ALT SERPL-CCNC: 28 U/L (ref 12–65)
ANION GAP SERPL CALC-SCNC: 7 MMOL/L (ref 2–11)
AST SERPL-CCNC: 24 U/L (ref 15–37)
BASOPHILS # BLD: 0.1 K/UL (ref 0–0.2)
BASOPHILS NFR BLD: 1 % (ref 0–2)
BILIRUB SERPL-MCNC: 0.6 MG/DL (ref 0.2–1.1)
BUN SERPL-MCNC: 17 MG/DL (ref 8–23)
CALCIUM SERPL-MCNC: 10.3 MG/DL (ref 8.3–10.4)
CHLORIDE SERPL-SCNC: 110 MMOL/L (ref 101–110)
CO2 SERPL-SCNC: 24 MMOL/L (ref 21–32)
CREAT SERPL-MCNC: 1 MG/DL (ref 0.6–1)
CREAT UR-MCNC: 311 MG/DL
DIFFERENTIAL METHOD BLD: ABNORMAL
EOSINOPHIL # BLD: 0.4 K/UL (ref 0–0.8)
EOSINOPHIL NFR BLD: 4 % (ref 0.5–7.8)
ERYTHROCYTE [DISTWIDTH] IN BLOOD BY AUTOMATED COUNT: 15.9 % (ref 11.9–14.6)
GLOBULIN SER CALC-MCNC: 4.1 G/DL (ref 2.8–4.5)
GLUCOSE SERPL-MCNC: 114 MG/DL (ref 65–100)
HCT VFR BLD AUTO: 52.9 % (ref 35.8–46.3)
HGB BLD-MCNC: 17.1 G/DL (ref 11.7–15.4)
IMM GRANULOCYTES # BLD AUTO: 0 K/UL (ref 0–0.5)
IMM GRANULOCYTES NFR BLD AUTO: 0 % (ref 0–5)
LYMPHOCYTES # BLD: 1.7 K/UL (ref 0.5–4.6)
LYMPHOCYTES NFR BLD: 17 % (ref 13–44)
MCH RBC QN AUTO: 30.2 PG (ref 26.1–32.9)
MCHC RBC AUTO-ENTMCNC: 32.3 G/DL (ref 31.4–35)
MCV RBC AUTO: 93.3 FL (ref 82–102)
MICROALBUMIN UR-MCNC: 88.6 MG/DL
MICROALBUMIN/CREAT UR-RTO: 285 MG/G (ref 0–30)
MONOCYTES # BLD: 0.8 K/UL (ref 0.1–1.3)
MONOCYTES NFR BLD: 8 % (ref 4–12)
NEUTS SEG # BLD: 7 K/UL (ref 1.7–8.2)
NEUTS SEG NFR BLD: 70 % (ref 43–78)
NRBC # BLD: 0 K/UL (ref 0–0.2)
PLATELET # BLD AUTO: 313 K/UL (ref 150–450)
PMV BLD AUTO: 8.9 FL (ref 9.4–12.3)
POTASSIUM SERPL-SCNC: 4.3 MMOL/L (ref 3.5–5.1)
PROT SERPL-MCNC: 8.3 G/DL (ref 6.3–8.2)
RBC # BLD AUTO: 5.67 M/UL (ref 4.05–5.2)
SODIUM SERPL-SCNC: 141 MMOL/L (ref 133–143)
WBC # BLD AUTO: 10 K/UL (ref 4.3–11.1)

## 2023-07-17 PROCEDURE — 3074F SYST BP LT 130 MM HG: CPT | Performed by: INTERNAL MEDICINE

## 2023-07-17 PROCEDURE — 3044F HG A1C LEVEL LT 7.0%: CPT | Performed by: INTERNAL MEDICINE

## 2023-07-17 PROCEDURE — 99214 OFFICE O/P EST MOD 30 MIN: CPT | Performed by: INTERNAL MEDICINE

## 2023-07-17 PROCEDURE — 3078F DIAST BP <80 MM HG: CPT | Performed by: INTERNAL MEDICINE

## 2023-07-17 ASSESSMENT — PATIENT HEALTH QUESTIONNAIRE - PHQ9
SUM OF ALL RESPONSES TO PHQ9 QUESTIONS 1 & 2: 0
1. LITTLE INTEREST OR PLEASURE IN DOING THINGS: 0
SUM OF ALL RESPONSES TO PHQ QUESTIONS 1-9: 0
2. FEELING DOWN, DEPRESSED OR HOPELESS: 0
SUM OF ALL RESPONSES TO PHQ QUESTIONS 1-9: 0

## 2023-07-17 ASSESSMENT — ENCOUNTER SYMPTOMS
SHORTNESS OF BREATH: 1
COUGH: 1

## 2023-07-18 LAB
EST. AVERAGE GLUCOSE BLD GHB EST-MCNC: 117 MG/DL
HBA1C MFR BLD: 5.7 % (ref 4.8–5.6)

## 2023-07-24 ENCOUNTER — HOSPITAL ENCOUNTER (OUTPATIENT)
Dept: ULTRASOUND IMAGING | Age: 63
Discharge: HOME | End: 2023-07-27
Attending: INTERNAL MEDICINE

## 2023-07-24 DIAGNOSIS — R10.11 RIGHT UPPER QUADRANT PAIN: ICD-10-CM

## 2023-07-25 ENCOUNTER — TELEPHONE (OUTPATIENT)
Dept: INTERNAL MEDICINE CLINIC | Facility: CLINIC | Age: 63
End: 2023-07-25

## 2023-07-25 NOTE — TELEPHONE ENCOUNTER
----- Message from Angel Newsome sent at 7/24/2023 12:30 PM EDT -----  Subject: Message to Provider    QUESTIONS  Information for Provider? Pam Beauchamp with [employer] wants to update   insurance information for this patient. Please call Pam Beauchamp at ext [phone].  ---------------------------------------------------------------------------  --------------  Amelia Paulson INFO  [phone]; OK to leave message on voicemail  ---------------------------------------------------------------------------  --------------  SCRIPT ANSWERS  Relationship to Patient? Covered Entity  Covered Entity Type? Health Insurance? Representative Name?  Pam Beauchamp

## 2023-07-31 ENCOUNTER — TELEPHONE (OUTPATIENT)
Age: 63
End: 2023-07-31

## 2023-07-31 NOTE — TELEPHONE ENCOUNTER
Pt states she stopped her verapamil because she was becoming dizzy upon standing. Saw PCP and she did orthostatics and noted pt's BP drops on standing. Pt saw GI recently and BP was 130/82. Wants to inform Dr. Rula Jordan she has stopped verapamil. Pt aware of f/u appt and will call our office back if needed.

## 2023-07-31 NOTE — TELEPHONE ENCOUNTER
Triage informed Dr. Raymon Cedillo. Per Dr. Raymon Cedillo, pt's HR may go up off verapamil. If needed, call our office back. Triage informed pt. She verbalizes understanding and agrees to plan.

## 2023-07-31 NOTE — TELEPHONE ENCOUNTER
Pt states she stopped taking her Verapamil about a week ago because she still felt like passing out when she stood up. States Dr. Espinoza noticed her BP does drop when she stands up. Pt wanted to make sure Dr. Rula Jordan is aware.

## 2023-08-02 ENCOUNTER — TELEPHONE (OUTPATIENT)
Age: 63
End: 2023-08-02

## 2023-08-02 NOTE — TELEPHONE ENCOUNTER
Patient having EGD/Colonoscopy with Dr. Maryanne Pittman on 10/13/23.  Requesting Plavix hold for 5 days prior and up to 14 days post. Fax: [phone]

## 2023-08-09 RX ORDER — ISOSORBIDE MONONITRATE 120 MG/1
120 TABLET, EXTENDED RELEASE ORAL DAILY
Qty: 90 TABLET | Refills: 3 | Status: SHIPPED | OUTPATIENT
Start: 2023-08-09

## 2023-08-09 NOTE — TELEPHONE ENCOUNTER
MEDICATION REFILL REQUEST      Name of Medication:  Isosorbide Mononitrate  Dose:  120 mg  Frequency:  QD  Quantity:  ?  Days' supply:  ?       Pharmacy Name/Location:  [phone]

## 2023-08-28 DIAGNOSIS — M51.36 DEGENERATION OF LUMBAR INTERVERTEBRAL DISC: Primary | ICD-10-CM

## 2023-08-28 RX ORDER — TRAMADOL HYDROCHLORIDE 50 MG/1
50 TABLET ORAL EVERY 8 HOURS PRN
Qty: 90 TABLET | Refills: 3 | Status: SHIPPED | OUTPATIENT
Start: 2023-08-28 | End: 2023-12-26

## 2023-09-08 ENCOUNTER — HOSPITAL ENCOUNTER (OUTPATIENT)
Dept: CT IMAGING | Age: 63
Discharge: HOME | End: 2023-09-08
Payer: COMMERCIAL

## 2023-09-08 DIAGNOSIS — F17.219 NICOTINE DEPENDENCE, CIGARETTES, WITH UNSPECIFIED NICOTINE-INDUCED DISORDERS: ICD-10-CM

## 2023-09-08 DIAGNOSIS — J44.9 CHRONIC OBSTRUCTIVE PULMONARY DISEASE, UNSPECIFIED COPD TYPE (HCC): ICD-10-CM

## 2023-09-08 DIAGNOSIS — R91.8 LUNG NODULES: ICD-10-CM

## 2023-09-08 PROCEDURE — 71250 CT THORAX DX C-: CPT

## 2023-09-11 ENCOUNTER — TELEPHONE (OUTPATIENT)
Dept: PULMONOLOGY | Age: 63
End: 2023-09-11

## 2023-09-11 NOTE — TELEPHONE ENCOUNTER
Chest CT demonstrates stable 5 mm nodule in the anterior RLL and 3 mm nodule left lung apex. There are no new or enlarging pulmonary nodules. I called her with results, will discuss further at upcoming appt.

## 2023-09-18 NOTE — PROGRESS NOTES
She is a 27-year-old female seen today for follow-up of severe hypoxia, tobacco use, abnormal chest CT with new 11 mm spiculated nodule in the RLL, coarse interstitial markings with bronchiectasis in the RLL, RML and to lesser degree in the lingula. Chest CT 3/7/2023 -interval improvement in infiltrates involving the lingula, RML and right lung base. Right lung base nodule has diminished in size, now 0.7 cm, likely infectious or inflammatory in nature. GLENNY nodule is stable and likely an area of scarring. No new or enlarging lesions. Emphysema. No adenopathy. Chest CT 9/8/2023-stable 5 mm nodule in RLL, stable 3 mm nodule left lung apex. No new or enlarging pulmonary nodules. DIAGNOSTICS:         Chest CT 6/2011. Chest CT 9/2011 - suggestion of minimal stable emphysematous change in the apices. 2 mm nodule in the RML along the minor fissure is unchanged. CXR's 12/2013. CXR 12/30/13 - reticular densities left lung base and periphery of right mid lung, may be areas of resolving consolidation. CXR 2/27/14 - clear lungs, no effusion or infiltrate. Spirometry 2/27/14 - Moderately severe obstruction, with low vital capacity. Spirometry 2/2015. CPFT's 4/21/15 - moderate airflow obstruction. There is improvement after bronchodilator. Lung volumes suggest mild air trapping. Diffusing capacity is severely reduced. Together this  would be consistent with moderate COPD, particularly with severe emphysema or complicating pulmonary vascular disease. Spirometry 11/23/2015 - severe obstructive defect with decreased FVC. Coughing during study, no significant change from 2/2015 but interval decline compared to 2014. Spirometry 9/16/2016 - severe obstructive defect, decreased FVC, no significant change. LDCT 9/2016 - Chronic lung changes, 2 tiny noncalcified granulomas on the right. Lung RADS category 2-benign.  Recommendation-continue with annual LDCT in 12 months as long as the patient

## 2023-09-19 ENCOUNTER — OFFICE VISIT (OUTPATIENT)
Dept: PULMONOLOGY | Age: 63
End: 2023-09-19
Payer: COMMERCIAL

## 2023-09-19 VITALS
OXYGEN SATURATION: 97 % | SYSTOLIC BLOOD PRESSURE: 102 MMHG | HEIGHT: 66 IN | BODY MASS INDEX: 19.61 KG/M2 | RESPIRATION RATE: 18 BRPM | WEIGHT: 122 LBS | TEMPERATURE: 97 F | DIASTOLIC BLOOD PRESSURE: 64 MMHG | HEART RATE: 95 BPM

## 2023-09-19 DIAGNOSIS — R93.89 ABNORMAL CHEST CT: ICD-10-CM

## 2023-09-19 DIAGNOSIS — J96.11 CHRONIC RESPIRATORY FAILURE WITH HYPOXIA (HCC): ICD-10-CM

## 2023-09-19 DIAGNOSIS — J44.9 STAGE 4 VERY SEVERE COPD BY GOLD CLASSIFICATION (HCC): Primary | ICD-10-CM

## 2023-09-19 DIAGNOSIS — J43.2 CENTRILOBULAR EMPHYSEMA (HCC): ICD-10-CM

## 2023-09-19 DIAGNOSIS — R91.8 LUNG NODULES: ICD-10-CM

## 2023-09-19 DIAGNOSIS — F17.219 CIGARETTE NICOTINE DEPENDENCE WITH NICOTINE-INDUCED DISORDER: ICD-10-CM

## 2023-09-19 DIAGNOSIS — J47.9 BRONCHIECTASIS WITHOUT COMPLICATION (HCC): ICD-10-CM

## 2023-09-19 LAB
EXPIRATORY TIME: NORMAL
FEF 25-75% %PRED-PRE: NORMAL
FEF 25-75% PRED: NORMAL
FEF 25-75%-PRE: NORMAL
FEV1 %PRED-PRE: 32 %
FEV1 PRED: NORMAL
FEV1/FVC %PRED-PRE: NORMAL
FEV1/FVC PRED: NORMAL
FEV1/FVC: 34 %
FEV1: 0.84 L
FVC %PRED-PRE: 71 %
FVC PRED: NORMAL
FVC: 2.48 L
PEF %PRED-PRE: NORMAL
PEF PRED: NORMAL
PEF-PRE: NORMAL

## 2023-09-19 PROCEDURE — 99214 OFFICE O/P EST MOD 30 MIN: CPT | Performed by: NURSE PRACTITIONER

## 2023-09-19 PROCEDURE — 3078F DIAST BP <80 MM HG: CPT | Performed by: NURSE PRACTITIONER

## 2023-09-19 PROCEDURE — 3074F SYST BP LT 130 MM HG: CPT | Performed by: NURSE PRACTITIONER

## 2023-09-19 PROCEDURE — 94010 BREATHING CAPACITY TEST: CPT | Performed by: INTERNAL MEDICINE

## 2023-09-19 ASSESSMENT — PULMONARY FUNCTION TESTS
FEV1: 0.84
FVC: 2.48
FVC_PERCENT_PREDICTED_PRE: 71
FEV1/FVC: 34
FEV1_PERCENT_PREDICTED_PRE: 32

## 2023-09-19 ASSESSMENT — ENCOUNTER SYMPTOMS
WHEEZING: 0
SPUTUM PRODUCTION: 1
COUGH: 1
HEMOPTYSIS: 0
SHORTNESS OF BREATH: 0

## 2023-09-19 NOTE — PROGRESS NOTES
[address] [employer] [address] [facility], [address] [place] [address]  [phone]    Patient Name:  Kalyani Ayala    YOB: 1960    Office Visit 9/19/2023      CHIEF COMPLAINT:      Chief Complaint   Patient presents with    Follow-up    COPD    Other     Hypoxia, lung nodule         ASSESSMENT:   (Medical Decision Making)                                                                                                                                          Encounter Diagnoses   Name Primary? Stage 4 very severe COPD by GOLD classification ([address]) Yes    Comment: On basis of severe COPD with chronic hypoxic respiratory failure. Abnormal chest CT     Chronic respiratory failure with hypoxia (HCC)     Bronchiectasis without complication (HCC)     Lung nodules     Centrilobular emphysema (HCC)     Cigarette nicotine dependence with nicotine-induced disorder      Stable symptomatically, no significant change in spirometry. She is compliant with Rx for COPD. She is compliant with supplemental oxygen which is within normal limits on 2 L/min. Currently not experiencing any significant difficulties with mucus clearance and no evidence of active infectious process. Prior AFB earlier this year was negative. RLL nodule at 5 mm, 3 mm left lung nodule at apex-felt to be stable. Actually RLL nodular is smaller. Unfortunately, she continues to smoke. She remains candidate for screening CT which will be due in 1 year. She is agreeable. PLAN:     DuoNeb via nebulizer 4 times daily, budesonide twice daily, rinse mouth after use. Albuterol inhaler when away from home/nebulizer. OTC mucolytic, (mucinex or generic equivalent of guaifenesin), 2400mg in 24 hours in divided doses as needed to thin mucous. Tessalon Perles refilled at her request for cough suppression. Continue Protonix with sleep as prescribed.      Continue to elevate head of bed, avoid

## 2023-09-19 NOTE — PATIENT INSTRUCTIONS
DuoNeb via nebulizer 4 times daily, budesonide twice daily, rinse mouth after use. Albuterol inhaler when away from home/nebulizer. OTC mucolytic, (mucinex or generic equivalent of guaifenesin), 2400mg in 24 hours in divided doses as needed to thin mucous. Chest CT in September 2024, will order at her next appt in 6 months. Smoking cessation is again advised. Recommend flu vaccine. She states that she cannot take COVID-vaccine. Booster of PPSV23 at age 72. She has had Prevnar 13. Recommend RSV vaccine, prescription is sent to her local pharmacy if covered by insurance.

## 2023-11-16 DIAGNOSIS — E11.9 TYPE 2 DIABETES MELLITUS WITHOUT COMPLICATION, WITHOUT LONG-TERM CURRENT USE OF INSULIN (HCC): Primary | ICD-10-CM

## 2023-11-16 RX ORDER — GLUCOSAMINE HCL/CHONDROITIN SU 500-400 MG
CAPSULE ORAL
Qty: 100 STRIP | Refills: 3 | Status: SHIPPED | OUTPATIENT
Start: 2023-11-16

## 2023-11-16 RX ORDER — BLOOD-GLUCOSE METER
1 KIT MISCELLANEOUS DAILY
Qty: 1 KIT | Refills: 0 | Status: SHIPPED | OUTPATIENT
Start: 2023-11-16

## 2023-11-27 ENCOUNTER — OFFICE VISIT (OUTPATIENT)
Age: 63
End: 2023-11-27
Payer: COMMERCIAL

## 2023-11-27 VITALS
DIASTOLIC BLOOD PRESSURE: 76 MMHG | HEART RATE: 98 BPM | WEIGHT: 121 LBS | SYSTOLIC BLOOD PRESSURE: 132 MMHG | HEIGHT: 66 IN | BODY MASS INDEX: 19.44 KG/M2

## 2023-11-27 DIAGNOSIS — I25.10 ASCVD (ARTERIOSCLEROTIC CARDIOVASCULAR DISEASE): Primary | ICD-10-CM

## 2023-11-27 DIAGNOSIS — J44.9 CHRONIC OBSTRUCTIVE PULMONARY DISEASE, UNSPECIFIED COPD TYPE (HCC): ICD-10-CM

## 2023-11-27 DIAGNOSIS — H54.61 VISION LOSS OF RIGHT EYE: ICD-10-CM

## 2023-11-27 DIAGNOSIS — I10 ESSENTIAL HYPERTENSION: ICD-10-CM

## 2023-11-27 PROCEDURE — 3075F SYST BP GE 130 - 139MM HG: CPT | Performed by: INTERNAL MEDICINE

## 2023-11-27 PROCEDURE — 99214 OFFICE O/P EST MOD 30 MIN: CPT | Performed by: INTERNAL MEDICINE

## 2023-11-27 PROCEDURE — 3078F DIAST BP <80 MM HG: CPT | Performed by: INTERNAL MEDICINE

## 2023-11-27 RX ORDER — DILTIAZEM HYDROCHLORIDE 180 MG/1
180 CAPSULE, EXTENDED RELEASE ORAL DAILY
Qty: 90 CAPSULE | Refills: 3 | Status: SHIPPED | OUTPATIENT
Start: 2023-11-27

## 2023-11-27 RX ORDER — DILTIAZEM HYDROCHLORIDE 180 MG/1
180 CAPSULE, EXTENDED RELEASE ORAL DAILY
COMMUNITY
End: 2023-11-27 | Stop reason: SDUPTHER

## 2023-11-27 ASSESSMENT — ENCOUNTER SYMPTOMS
ORTHOPNEA: 0
CHEST TIGHTNESS: 0
ABDOMINAL PAIN: 0
DIARRHEA: 0
HOARSE VOICE: 0
RIGHT EYE: 1
BLURRED VISION: 0
SPUTUM PRODUCTION: 0
WHEEZING: 0
SHORTNESS OF BREATH: 1
HEMATEMESIS: 0
BOWEL INCONTINENCE: 0
COLOR CHANGE: 0
HEMATOCHEZIA: 0

## 2023-12-12 RX ORDER — CITALOPRAM 40 MG/1
40 TABLET ORAL DAILY
Qty: 90 TABLET | Refills: 1 | Status: SHIPPED | OUTPATIENT
Start: 2023-12-12

## 2023-12-12 RX ORDER — ATORVASTATIN CALCIUM 40 MG/1
TABLET, FILM COATED ORAL
Qty: 90 TABLET | Refills: 2 | Status: SHIPPED | OUTPATIENT
Start: 2023-12-12

## 2023-12-12 NOTE — TELEPHONE ENCOUNTER
Requested Prescriptions     Pending Prescriptions Disp Refills    citalopram (CELEXA) 40 MG tablet [Pharmacy Med Name: CITALOPRAM HBR 40 MG TABLET] 90 tablet 1     Sig: TAKE 1 TABLET BY MOUTH EVERY DAY     Pt requesting refill.  Next appt 1/22/2024

## 2024-01-11 ENCOUNTER — OFFICE VISIT (OUTPATIENT)
Age: 64
End: 2024-01-11
Payer: COMMERCIAL

## 2024-01-11 VITALS
HEIGHT: 66 IN | DIASTOLIC BLOOD PRESSURE: 44 MMHG | SYSTOLIC BLOOD PRESSURE: 126 MMHG | WEIGHT: 121 LBS | HEART RATE: 96 BPM | BODY MASS INDEX: 19.44 KG/M2

## 2024-01-11 DIAGNOSIS — I10 ESSENTIAL HYPERTENSION: ICD-10-CM

## 2024-01-11 DIAGNOSIS — I25.10 ASCVD (ARTERIOSCLEROTIC CARDIOVASCULAR DISEASE): Primary | ICD-10-CM

## 2024-01-11 PROCEDURE — 3078F DIAST BP <80 MM HG: CPT | Performed by: INTERNAL MEDICINE

## 2024-01-11 PROCEDURE — 3074F SYST BP LT 130 MM HG: CPT | Performed by: INTERNAL MEDICINE

## 2024-01-11 PROCEDURE — 99214 OFFICE O/P EST MOD 30 MIN: CPT | Performed by: INTERNAL MEDICINE

## 2024-01-11 RX ORDER — ASCORBIC ACID 500 MG
500 TABLET ORAL DAILY
COMMUNITY

## 2024-01-11 ASSESSMENT — ENCOUNTER SYMPTOMS
BLURRED VISION: 0
COLOR CHANGE: 0
DIARRHEA: 0
ABDOMINAL PAIN: 0
SPUTUM PRODUCTION: 0
ORTHOPNEA: 0
WHEEZING: 0
HOARSE VOICE: 0
BOWEL INCONTINENCE: 0
HEMATOCHEZIA: 0
HEMATEMESIS: 0
SHORTNESS OF BREATH: 1
CHEST TIGHTNESS: 0

## 2024-01-11 NOTE — PROGRESS NOTES
[facility] CARDIOLOGY  [address] [place] [address], [address]  [place] [address]  PHONE: [phone]        24        NAME:  Lisa Serrano  : 1960  MRN: [id number]       SUBJECTIVE:   Lisa Serrano is a 63 y.o. female seen for a follow up visit regarding the following: The patient has CAD,COPD,CVA,PSVT,and primary hypertension.Previously,the patient reported partial right eye vision loss and  she was concerned with possible cardiac etiology.Follow carotid artery ultrasound reported <50% bilateral ICA stenosis.Echo reported low normal LV EF with trace MR  TR with no obvious shunt.She returns to discuss test results.I discussed test results with the patient and her daughter.Overall,she reports feeling a little better.    Chief Complaint   Patient presents with    Coronary Artery Disease    Palpitations       HPI:    Coronary Artery Disease  Presents for follow-up visit. Symptoms include dizziness and shortness of breath. Pertinent negatives include no chest pain, chest pressure, chest tightness, leg swelling, muscle weakness, palpitations or weight gain. The symptoms have been stable.       Past Medical History, Past Surgical History, Family history, Social History, and Medications were all reviewed with the patient today and updated as necessary.         Current Outpatient Medications:     vitamin C (ASCORBIC ACID) 500 MG tablet, Take 1 tablet by mouth daily, Disp: , Rfl:     atorvastatin (LIPITOR) 40 MG tablet, TAKE 1 TABLET BY MOUTH EVERY DAY, Disp: 90 tablet, Rfl: 2    citalopram (CELEXA) 40 MG tablet, TAKE 1 TABLET BY MOUTH EVERY DAY, Disp: 90 tablet, Rfl: 1    dilTIAZem (DILACOR XR) 180 MG extended release capsule, Take 1 capsule by mouth daily, Disp: 90 capsule, Rfl: 3    glucose monitoring kit, 1 kit by Does not apply route daily, Disp: 1 kit, Rfl: 0    blood glucose monitor strips, Test one times a day & as needed for symptoms of irregular blood glucose. Dispense sufficient amount for indicated

## 2024-01-22 ENCOUNTER — OFFICE VISIT (OUTPATIENT)
Dept: INTERNAL MEDICINE CLINIC | Facility: CLINIC | Age: 64
End: 2024-01-22
Payer: COMMERCIAL

## 2024-01-22 VITALS
BODY MASS INDEX: 19.13 KG/M2 | SYSTOLIC BLOOD PRESSURE: 110 MMHG | HEIGHT: 66 IN | DIASTOLIC BLOOD PRESSURE: 64 MMHG | WEIGHT: 119 LBS

## 2024-01-22 DIAGNOSIS — J44.9 CHRONIC OBSTRUCTIVE PULMONARY DISEASE, UNSPECIFIED COPD TYPE (HCC): ICD-10-CM

## 2024-01-22 DIAGNOSIS — E55.9 VITAMIN D DEFICIENCY: Primary | ICD-10-CM

## 2024-01-22 DIAGNOSIS — H53.9 VISION DISTURBANCE: ICD-10-CM

## 2024-01-22 DIAGNOSIS — I63.9 CEREBROVASCULAR ACCIDENT (CVA), UNSPECIFIED MECHANISM (HCC): ICD-10-CM

## 2024-01-22 DIAGNOSIS — Z79.899 ON STATIN THERAPY: ICD-10-CM

## 2024-01-22 DIAGNOSIS — G89.29 CHRONIC NONINTRACTABLE HEADACHE, UNSPECIFIED HEADACHE TYPE: ICD-10-CM

## 2024-01-22 DIAGNOSIS — E11.9 TYPE 2 DIABETES MELLITUS WITHOUT COMPLICATION, WITHOUT LONG-TERM CURRENT USE OF INSULIN (HCC): ICD-10-CM

## 2024-01-22 DIAGNOSIS — R05.3 CHRONIC COUGH: ICD-10-CM

## 2024-01-22 DIAGNOSIS — I10 ESSENTIAL HYPERTENSION: ICD-10-CM

## 2024-01-22 DIAGNOSIS — R51.9 CHRONIC NONINTRACTABLE HEADACHE, UNSPECIFIED HEADACHE TYPE: ICD-10-CM

## 2024-01-22 DIAGNOSIS — E78.2 MIXED HYPERLIPIDEMIA: ICD-10-CM

## 2024-01-22 DIAGNOSIS — E11.9 TYPE 2 DIABETES MELLITUS WITHOUT COMPLICATION, WITHOUT LONG-TERM CURRENT USE OF INSULIN (HCC): Primary | ICD-10-CM

## 2024-01-22 DIAGNOSIS — F41.9 ANXIETY: ICD-10-CM

## 2024-01-22 DIAGNOSIS — E55.9 VITAMIN D DEFICIENCY: ICD-10-CM

## 2024-01-22 LAB
25(OH)D3 SERPL-MCNC: 52.1 NG/ML (ref 30–100)
ALBUMIN SERPL-MCNC: 4 G/DL (ref 3.2–4.6)
ALBUMIN/GLOB SERPL: 1.1 (ref 0.4–1.6)
ALP SERPL-CCNC: 87 U/L (ref 50–136)
ALT SERPL-CCNC: 28 U/L (ref 12–65)
ANION GAP SERPL CALC-SCNC: 4 MMOL/L (ref 2–11)
AST SERPL-CCNC: 24 U/L (ref 15–37)
BASOPHILS # BLD: 0.1 K/UL (ref 0–0.2)
BASOPHILS NFR BLD: 1 % (ref 0–2)
BILIRUB SERPL-MCNC: 0.4 MG/DL (ref 0.2–1.1)
BUN SERPL-MCNC: 23 MG/DL (ref 8–23)
CALCIUM SERPL-MCNC: 9.6 MG/DL (ref 8.3–10.4)
CHLORIDE SERPL-SCNC: 106 MMOL/L (ref 103–113)
CHOLEST SERPL-MCNC: 126 MG/DL
CO2 SERPL-SCNC: 26 MMOL/L (ref 21–32)
CREAT SERPL-MCNC: 0.8 MG/DL (ref 0.6–1)
DIFFERENTIAL METHOD BLD: ABNORMAL
EOSINOPHIL # BLD: 0.2 K/UL (ref 0–0.8)
EOSINOPHIL NFR BLD: 2 % (ref 0.5–7.8)
ERYTHROCYTE [DISTWIDTH] IN BLOOD BY AUTOMATED COUNT: 14 % (ref 11.9–14.6)
EST. AVERAGE GLUCOSE BLD GHB EST-MCNC: 117 MG/DL
GLOBULIN SER CALC-MCNC: 3.8 G/DL (ref 2.8–4.5)
GLUCOSE SERPL-MCNC: 124 MG/DL (ref 65–100)
HBA1C MFR BLD: 5.7 % (ref 4.8–5.6)
HCT VFR BLD AUTO: 49.7 % (ref 35.8–46.3)
HDLC SERPL-MCNC: 64 MG/DL (ref 40–60)
HDLC SERPL: 2
HGB BLD-MCNC: 16.1 G/DL (ref 11.7–15.4)
IMM GRANULOCYTES # BLD AUTO: 0 K/UL (ref 0–0.5)
IMM GRANULOCYTES NFR BLD AUTO: 0 % (ref 0–5)
LDLC SERPL CALC-MCNC: 51.2 MG/DL
LYMPHOCYTES # BLD: 1.3 K/UL (ref 0.5–4.6)
LYMPHOCYTES NFR BLD: 13 % (ref 13–44)
MCH RBC QN AUTO: 30.7 PG (ref 26.1–32.9)
MCHC RBC AUTO-ENTMCNC: 32.4 G/DL (ref 31.4–35)
MCV RBC AUTO: 94.8 FL (ref 82–102)
MONOCYTES # BLD: 0.7 K/UL (ref 0.1–1.3)
MONOCYTES NFR BLD: 7 % (ref 4–12)
NEUTS SEG # BLD: 7.2 K/UL (ref 1.7–8.2)
NEUTS SEG NFR BLD: 77 % (ref 43–78)
NRBC # BLD: 0 K/UL (ref 0–0.2)
PLATELET # BLD AUTO: 289 K/UL (ref 150–450)
PMV BLD AUTO: 8.9 FL (ref 9.4–12.3)
POTASSIUM SERPL-SCNC: 4.2 MMOL/L (ref 3.5–5.1)
PROT SERPL-MCNC: 7.8 G/DL (ref 6.3–8.2)
RBC # BLD AUTO: 5.24 M/UL (ref 4.05–5.2)
SODIUM SERPL-SCNC: 136 MMOL/L (ref 136–146)
TRIGL SERPL-MCNC: 54 MG/DL (ref 35–150)
VLDLC SERPL CALC-MCNC: 10.8 MG/DL (ref 6–23)
WBC # BLD AUTO: 9.5 K/UL (ref 4.3–11.1)

## 2024-01-22 PROCEDURE — 3078F DIAST BP <80 MM HG: CPT | Performed by: INTERNAL MEDICINE

## 2024-01-22 PROCEDURE — 3074F SYST BP LT 130 MM HG: CPT | Performed by: INTERNAL MEDICINE

## 2024-01-22 PROCEDURE — 99214 OFFICE O/P EST MOD 30 MIN: CPT | Performed by: INTERNAL MEDICINE

## 2024-01-22 RX ORDER — FAMOTIDINE 40 MG/1
40 TABLET, FILM COATED ORAL NIGHTLY PRN
COMMUNITY
Start: 2023-07-31

## 2024-01-22 ASSESSMENT — PATIENT HEALTH QUESTIONNAIRE - PHQ9
SUM OF ALL RESPONSES TO PHQ QUESTIONS 1-9: 0
4. FEELING TIRED OR HAVING LITTLE ENERGY: 0
6. FEELING BAD ABOUT YOURSELF - OR THAT YOU ARE A FAILURE OR HAVE LET YOURSELF OR YOUR FAMILY DOWN: 0
2. FEELING DOWN, DEPRESSED OR HOPELESS: 0
10. IF YOU CHECKED OFF ANY PROBLEMS, HOW DIFFICULT HAVE THESE PROBLEMS MADE IT FOR YOU TO DO YOUR WORK, TAKE CARE OF THINGS AT HOME, OR GET ALONG WITH OTHER PEOPLE: 0
SUM OF ALL RESPONSES TO PHQ QUESTIONS 1-9: 0
1. LITTLE INTEREST OR PLEASURE IN DOING THINGS: 0
7. TROUBLE CONCENTRATING ON THINGS, SUCH AS READING THE NEWSPAPER OR WATCHING TELEVISION: 0
SUM OF ALL RESPONSES TO PHQ QUESTIONS 1-9: 0
SUM OF ALL RESPONSES TO PHQ QUESTIONS 1-9: 0
5. POOR APPETITE OR OVEREATING: 0
8. MOVING OR SPEAKING SO SLOWLY THAT OTHER PEOPLE COULD HAVE NOTICED. OR THE OPPOSITE, BEING SO FIGETY OR RESTLESS THAT YOU HAVE BEEN MOVING AROUND A LOT MORE THAN USUAL: 0
SUM OF ALL RESPONSES TO PHQ9 QUESTIONS 1 & 2: 0
9. THOUGHTS THAT YOU WOULD BE BETTER OFF DEAD, OR OF HURTING YOURSELF: 0
3. TROUBLE FALLING OR STAYING ASLEEP: 0

## 2024-01-22 ASSESSMENT — ENCOUNTER SYMPTOMS
SHORTNESS OF BREATH: 1
COUGH: 1

## 2024-01-22 NOTE — PROGRESS NOTES
HPI: Lisa Serrano (: 1960)    States had some vision changes and was seen by Arpita Luz- Dr Agosto   Unsure if migraine or AF   Or tension related  Last head CT showed microvascular changes  Does not have diabetic retinopathy    States HA's are frontal or right sided and almost daily    States also having issues maintaining her wt    Did see her Cardiologist and had carotid ultrasound and does not have significant disease  Also had Echo      Problem List:  Patient Active Problem List   Diagnosis   • Dependence on continuous supplemental oxygen   • Polycythemia   • Chronic respiratory failure with hypoxia ([facility])   • Tracheobronchitis   • Memory changes   • Vitamin D deficiency   • Mixed simple and mucopurulent chronic bronchitis ([facility])   • Palpitations   • Cystitis   • Diabetes ([facility])   • Irritable bowel syndrome   • Chronic pain syndrome   • Snoring   • Chronic cough   • Fibromyalgia   • Essential hypertension   • Tobacco use   • Depression   • Anxiety   • Nicotine dependence, cigarettes, with unspecified nicotine-induced disorders   • Angina pectoris ([facility])   • ASCVD (arteriosclerotic cardiovascular disease)   • COPD (chronic obstructive pulmonary disease) ([facility])   • Bursitis   • Liver disease   • GERD (gastroesophageal reflux disease)   • Mixed hyperlipidemia   • Lung nodules   • CVA (cerebral vascular accident) ([facility])   • Osteoarthritis   • Degeneration of lumbar intervertebral disc   • Insomnia   • Pain in joint, pelvic region and thigh   • Signs and symptoms involving cognition   • Rheumatism   • On statin therapy   • Abnormal chest CT   • Bronchiectasis without complication ([facility])   • Pulmonary infiltrates   • Weight loss   • Vision loss of right eye       History:  Past Medical History:   Diagnosis Date   • Acute renal failure ([facility]) 2013    Baseline creatinine was 0.8, and currently it is 2.9    • Acute respiratory failure ([facility]) 2013   • Adverse effect of anesthesia     difficulty waking up   •

## 2024-01-23 RX ORDER — TRAMADOL HYDROCHLORIDE 50 MG/1
50 TABLET ORAL EVERY 8 HOURS PRN
COMMUNITY

## 2024-01-25 ENCOUNTER — OFFICE VISIT (OUTPATIENT)
Dept: NEUROLOGY | Age: 64
End: 2024-01-25
Payer: COMMERCIAL

## 2024-01-25 VITALS
DIASTOLIC BLOOD PRESSURE: 81 MMHG | WEIGHT: 120 LBS | SYSTOLIC BLOOD PRESSURE: 150 MMHG | BODY MASS INDEX: 19.29 KG/M2 | OXYGEN SATURATION: 95 % | HEIGHT: 66 IN | HEART RATE: 102 BPM

## 2024-01-25 DIAGNOSIS — G44.52 NEW DAILY PERSISTENT HEADACHE: Primary | ICD-10-CM

## 2024-01-25 PROCEDURE — 3077F SYST BP >= 140 MM HG: CPT | Performed by: PHYSICAL THERAPIST

## 2024-01-25 PROCEDURE — 99204 OFFICE O/P NEW MOD 45 MIN: CPT | Performed by: PHYSICAL THERAPIST

## 2024-01-25 PROCEDURE — 3079F DIAST BP 80-89 MM HG: CPT | Performed by: PHYSICAL THERAPIST

## 2024-01-25 RX ORDER — PROPRANOLOL HCL 60 MG
60 CAPSULE, EXTENDED RELEASE 24HR ORAL DAILY
Qty: 30 CAPSULE | Refills: 3 | Status: SHIPPED | OUTPATIENT
Start: 2024-01-25

## 2024-01-25 ASSESSMENT — PATIENT HEALTH QUESTIONNAIRE - PHQ9
SUM OF ALL RESPONSES TO PHQ QUESTIONS 1-9: 0
1. LITTLE INTEREST OR PLEASURE IN DOING THINGS: 0
SUM OF ALL RESPONSES TO PHQ9 QUESTIONS 1 & 2: 0
SUM OF ALL RESPONSES TO PHQ QUESTIONS 1-9: 0
2. FEELING DOWN, DEPRESSED OR HOPELESS: 0

## 2024-01-25 NOTE — PROGRESS NOTES
a headache diary  to reveal triggers and possible patterns.  Triggers may be: Food, stress, perfumes, alcohol, or even chocolate.  Drink plenty of water and try to get 8 hours of sleep each night to reduce risk factors that may cause headaches.    Other orders  -     propranolol (INDERAL LA) 60 MG extended release capsule; Take 1 capsule by mouth daily    TIA  -Event 1 year ago was consistent with amaurosis fugax.  These events of partial vision loss may also be result of TIA like activity.  -I am suspicious for labile blood pressures causing this as she is on dual antiplatelet therapy, has had echocardiogram, and recent extended Holter monitor to assess for cardiac dysfunction and A-fib.  -Her other risk factors are well-controlled.  Her diabetes A1c is under 6, and her LDL is under 70.  -Will order CTA to look at the intracranial vessels.  If there is significant plaque burden could consider adding Repatha however I do believe that he is more pressing to encourage discontinuation of smoking and obtain better control of blood pressures.          Follow-up and Dispositions    Return in about 3 months (around 4/25/2024) for Migraine.           I spent  50% of the 45 total minutes of today's visit in coordination of care and patient/family education and counseling regarding the above patient concerns, reviewing the patient's medical record, my assessment and recommendations.       Mino Alberts JR, PA  [facility] [address], [address]  [place] [address]  Phone:[phone]

## 2024-01-29 ENCOUNTER — TELEPHONE (OUTPATIENT)
Dept: PULMONOLOGY | Age: 64
End: 2024-01-29

## 2024-01-29 NOTE — TELEPHONE ENCOUNTER
TRIAGE CALL      Complaint: Coughing  Cough: yes  Productive:  yes  Bloody Sputum:  no  Increased SOB/Wheezing:  yes  Duration: 3 days  Fever/Chills: yes  OTC Meds tried: Advil

## 2024-01-30 RX ORDER — BENZONATATE 200 MG/1
CAPSULE ORAL
Qty: 60 CAPSULE | Refills: 0 | OUTPATIENT
Start: 2024-01-30

## 2024-01-30 RX ORDER — DOXYCYCLINE HYCLATE 100 MG
100 TABLET ORAL 2 TIMES DAILY
Qty: 14 TABLET | Refills: 0 | Status: SHIPPED | OUTPATIENT
Start: 2024-01-30 | End: 2024-02-06

## 2024-01-30 RX ORDER — PREDNISONE 20 MG/1
TABLET ORAL
Qty: 15 TABLET | Refills: 0 | Status: SHIPPED | OUTPATIENT
Start: 2024-01-30

## 2024-01-30 NOTE — TELEPHONE ENCOUNTER
Last seen: 9/19/23  Hx: severe COPD, chronic resp fail, bronchiectasis, nodules    Patient call reporting increased coughing for 3 days.  Contacted patient regarding s/s; cough is productive of green, brown sputum; using nebulizer QID w/ proair PRN; some fever symptoms at night, not measured; no known viral exposures; feels like URI. Confirmed local pharmacy.  Asking if steroids & antibiotic can be called in.

## 2024-01-30 NOTE — TELEPHONE ENCOUNTER
Due to high cases of flu, RSV, and COVID in our area, consider viral testing/urgent care visit.    Doxycycline 100mg po bid for 7 days, # 14, no refill.    Prednisone 20 mg - 2 po q am pc for 3 days, 1 and 1/2 for 3 days, 1 for 3 days, 1/2 for 3 days, then stop or as directed.    Above sent in to pharmacy.    Advise to refrain from taking any NSAIDs along with prednisone.    If she does not improve, needs to be seen here,  urgent care or w/ primary.

## 2024-02-05 ENCOUNTER — TELEPHONE (OUTPATIENT)
Dept: NEUROLOGY | Age: 64
End: 2024-02-05

## 2024-02-05 DIAGNOSIS — G43.709 CHRONIC MIGRAINE WITHOUT AURA WITHOUT STATUS MIGRAINOSUS, NOT INTRACTABLE: Primary | ICD-10-CM

## 2024-02-05 RX ORDER — UBROGEPANT 100 MG/1
100 TABLET ORAL DAILY PRN
Qty: 16 TABLET | Refills: 5 | Status: SHIPPED | OUTPATIENT
Start: 2024-02-05

## 2024-02-05 RX ORDER — TOPIRAMATE 25 MG/1
25 TABLET ORAL 2 TIMES DAILY
Qty: 30 TABLET | Refills: 0 | Status: SHIPPED | OUTPATIENT
Start: 2024-02-05

## 2024-02-05 NOTE — TELEPHONE ENCOUNTER
Pt took wasn't able to get up all day Saturday and slept,slept and slept . Patient stated she will not take again       Ublelvy is working great and she will like a prescription to be sent in.   Until then she might come get some so she wont run out

## 2024-02-06 ENCOUNTER — HOSPITAL ENCOUNTER (OUTPATIENT)
Dept: CT IMAGING | Age: 64
Discharge: HOME | End: 2024-02-08
Payer: COMMERCIAL

## 2024-02-06 DIAGNOSIS — G44.52 NEW DAILY PERSISTENT HEADACHE: ICD-10-CM

## 2024-02-06 PROCEDURE — 6360000004 HC RX CONTRAST MEDICATION: Performed by: PHYSICAL THERAPIST

## 2024-02-06 PROCEDURE — 70496 CT ANGIOGRAPHY HEAD: CPT

## 2024-02-06 RX ADMIN — IOPAMIDOL 60 ML: 755 INJECTION, SOLUTION INTRAVENOUS at 14:07

## 2024-02-09 ENCOUNTER — HOSPITAL ENCOUNTER (OUTPATIENT)
Age: 64
Discharge: HOME | End: 2024-02-09
Payer: COMMERCIAL

## 2024-02-09 DIAGNOSIS — G44.52 NEW DAILY PERSISTENT HEADACHE: ICD-10-CM

## 2024-02-09 PROCEDURE — 70553 MRI BRAIN STEM W/O & W/DYE: CPT

## 2024-02-09 PROCEDURE — A9579 GAD-BASE MR CONTRAST NOS,1ML: HCPCS | Performed by: PHYSICAL THERAPIST

## 2024-02-09 PROCEDURE — 6360000004 HC RX CONTRAST MEDICATION: Performed by: PHYSICAL THERAPIST

## 2024-02-09 RX ADMIN — GADOTERIDOL 10 ML: 279.3 INJECTION, SOLUTION INTRAVENOUS at 12:46

## 2024-03-06 DIAGNOSIS — Z86.73 PERSONAL HISTORY OF TRANSIENT ISCHEMIC ATTACK (TIA), AND CEREBRAL INFARCTION WITHOUT RESIDUAL DEFICITS: ICD-10-CM

## 2024-03-06 RX ORDER — CLOPIDOGREL BISULFATE 75 MG/1
TABLET ORAL
Qty: 90 TABLET | Refills: 2 | Status: SHIPPED | OUTPATIENT
Start: 2024-03-06 | End: 2024-03-07 | Stop reason: SDUPTHER

## 2024-03-06 NOTE — TELEPHONE ENCOUNTER
MEDICATION REFILL REQUEST      Name of Medication:  clopidogrel   Dose:  75 mg  Frequency:    Quantity:    Days' supply:        Pharmacy Name/Location:  [facility]

## 2024-03-07 RX ORDER — CLOPIDOGREL BISULFATE 75 MG/1
TABLET ORAL
Qty: 90 TABLET | Refills: 3 | Status: SHIPPED | OUTPATIENT
Start: 2024-03-07

## 2024-03-07 RX ORDER — BENZONATATE 200 MG/1
CAPSULE ORAL
Qty: 60 CAPSULE | Refills: 0 | OUTPATIENT
Start: 2024-03-07

## 2024-04-03 NOTE — PROGRESS NOTES
[facility]  [address]  [place] [address]  [phone]    Patient Name:  Lisa Serrano    YOB: 1960    Office Visit 4/4/2024      CHIEF COMPLAINT:      Chief Complaint   Patient presents with    Follow-up    COPD         ASSESSMENT:   (Medical Decision Making)                                                                                                                                          Encounter Diagnoses   Name Primary?    Stage 4 very severe COPD by GOLD classification (HCC) Yes    Chronic respiratory failure with hypoxia (HCC)     Bronchiectasis without complication (HCC)     Lung nodules     Centrilobular emphysema (HCC)     Cigarette nicotine dependence with nicotine-induced disorder      She is stable symptomatically.  She reports compliance with DuoNeb 4 times daily and budesonide twice daily.  Uses albuterol when away from home.    She is compliant with supplemental oxygen.    Currently not experiencing any significant difficulties with mucus clearance and no evidence of active infectious process. Prior AFB earlier this year was negative.     RLL nodule at 5 mm, 3 mm left lung nodule at apex-felt to be stable. Actually RLL nodular was smaller on CT 9/2023.    Discussed current guidelines for screening for lung cancer, annual screening LDCT from age 55-77 or until tobacco free for 15 years.  Pack-year history -42 pack years  Tobacco status -continues to smoke with no plans for cessation; has made attempts in past.  Discussed risk and benefits of screening, currently exhibits no signs and symptoms suggestive of lung cancer.  Discussed importance of compliance with annual lung cancer screenings and willingness to undergo diagnosis and treatment if screening scan is positive.  In addition, the patient was counseled regarding remaining tobacco free/total smoking cessation.    She requests refill for Tessalon Perles that helps with nighttime

## 2024-04-04 ENCOUNTER — OFFICE VISIT (OUTPATIENT)
Dept: PULMONOLOGY | Age: 64
End: 2024-04-04
Payer: COMMERCIAL

## 2024-04-04 VITALS
BODY MASS INDEX: 18.36 KG/M2 | WEIGHT: 117 LBS | RESPIRATION RATE: 17 BRPM | TEMPERATURE: 97.2 F | HEART RATE: 92 BPM | OXYGEN SATURATION: 97 % | SYSTOLIC BLOOD PRESSURE: 110 MMHG | HEIGHT: 67 IN | DIASTOLIC BLOOD PRESSURE: 76 MMHG

## 2024-04-04 DIAGNOSIS — J43.2 CENTRILOBULAR EMPHYSEMA (HCC): ICD-10-CM

## 2024-04-04 DIAGNOSIS — R91.8 LUNG NODULES: ICD-10-CM

## 2024-04-04 DIAGNOSIS — F17.219 CIGARETTE NICOTINE DEPENDENCE WITH NICOTINE-INDUCED DISORDER: ICD-10-CM

## 2024-04-04 DIAGNOSIS — J96.11 CHRONIC RESPIRATORY FAILURE WITH HYPOXIA (HCC): ICD-10-CM

## 2024-04-04 DIAGNOSIS — J44.9 STAGE 4 VERY SEVERE COPD BY GOLD CLASSIFICATION (HCC): Primary | ICD-10-CM

## 2024-04-04 DIAGNOSIS — J47.9 BRONCHIECTASIS WITHOUT COMPLICATION (HCC): ICD-10-CM

## 2024-04-04 PROCEDURE — 3078F DIAST BP <80 MM HG: CPT | Performed by: NURSE PRACTITIONER

## 2024-04-04 PROCEDURE — 99214 OFFICE O/P EST MOD 30 MIN: CPT | Performed by: NURSE PRACTITIONER

## 2024-04-04 PROCEDURE — G0296 VISIT TO DETERM LDCT ELIG: HCPCS | Performed by: NURSE PRACTITIONER

## 2024-04-04 PROCEDURE — 3074F SYST BP LT 130 MM HG: CPT | Performed by: NURSE PRACTITIONER

## 2024-04-04 RX ORDER — ALBUTEROL SULFATE 90 UG/1
AEROSOL, METERED RESPIRATORY (INHALATION)
Qty: 18 G | Refills: 11 | Status: SHIPPED | OUTPATIENT
Start: 2024-04-04

## 2024-04-04 RX ORDER — BENZONATATE 200 MG/1
CAPSULE ORAL
Qty: 60 CAPSULE | Refills: 1 | Status: SHIPPED | OUTPATIENT
Start: 2024-04-04

## 2024-04-04 ASSESSMENT — ENCOUNTER SYMPTOMS
SPUTUM PRODUCTION: 1
SHORTNESS OF BREATH: 1
HEMOPTYSIS: 0
COUGH: 1
WHEEZING: 0

## 2024-04-04 NOTE — PATIENT INSTRUCTIONS
DuoNeb via nebulizer 4 times daily, budesonide twice daily, rinse mouth after use.    Albuterol inhaler when away from home/nebulizer.    OTC mucolytic, (mucinex or generic equivalent of guaifenesin), 2400mg in 24 hours in divided doses as needed to thin mucous.     Tessalon Perles refilled at her request for cough suppression.     Continue Protonix with sleep as prescribed.     Continue to elevate head of bed, avoid eating or drinking within 1 hour of lying down.  Refrain from spicy or acidic foods and limit caffeine and mints.  Boost or Ensure supplements, multivitamin daily.     Screening Chest CT in September 2024, I will call her with results.  Radiology scheduling dept # is provided if necessary to schedule chest CT ([phone])    Smoking cessation is again emphasized.    She requests to be seen in [place] office as she lives in closer proximity.  Will arrange 40 min appt w/ NP/MD there, spirometry will be obtained.       Learning About Lung Cancer Screening  What is screening for lung cancer?     Lung cancer screening is a way to find some lung cancers early, before a person has any symptoms of the cancer.  Lung cancer screening may help those who have the highest risk for lung cancer--people age 50 and older who are or were heavy smokers. For most people, who aren't at increased risk, screening for lung cancer probably isn't helpful.  Screening won't prevent cancer. And it may not find all lung cancers. Lung cancer screening may lower the risk of dying from lung cancer in a small number of people.  How is it done?  Lung cancer screening is done with a low-dose CT (computed tomography) scan. A CT scan uses X-rays, or radiation, to make detailed pictures of your body. Experts recommend that screening be done in medical centers that focus on finding and treating lung cancer.  Who is screening recommended for?  Lung cancer screening is recommended for people age 50 and older who are or were heavy

## 2024-04-28 RX ORDER — PROPRANOLOL HCL 60 MG
CAPSULE, EXTENDED RELEASE 24HR ORAL DAILY
Qty: 90 CAPSULE | Refills: 1 | Status: SHIPPED | OUTPATIENT
Start: 2024-04-28

## 2024-04-29 ENCOUNTER — TELEPHONE (OUTPATIENT)
Dept: PULMONOLOGY | Age: 64
End: 2024-04-29

## 2024-04-29 RX ORDER — AZITHROMYCIN 250 MG/1
TABLET, FILM COATED ORAL
Qty: 6 TABLET | Refills: 0 | Status: SHIPPED | OUTPATIENT
Start: 2024-04-29

## 2024-04-29 RX ORDER — PREDNISONE 20 MG/1
TABLET ORAL
Qty: 15 TABLET | Refills: 0 | Status: SHIPPED | OUTPATIENT
Start: 2024-04-29 | End: 2024-05-05

## 2024-04-29 NOTE — PROGRESS NOTES
[facility] [address], [address]  [place] [address]  [phone]      Chief Complaint   Patient presents with    Follow-up     Migraine       HPI  Lisa Serrano is a 64 y.o. female who presents for f/u of her headaches.       Interval History  Still having headaches nearly daily.  She attempted the propranolol however it made her very sleepy and she was worried about her blood pressure, so she discontinued this as we discussed.  I prescribed topiramate, but she was scared it would have the side effects and she did not start taking it.  She does not wish to pursue sleep study as she had one somewhere on 5 to 6 years ago that was normal.  She also endorses she does not wish to quit smoking at this time.  She does endorse her headaches are worse with stress, and the Ubrelvy seems to help tremendously with the headaches.     Past Medical History:   Diagnosis Date    Acute renal failure ([facility]) 12/5/2013    Baseline creatinine was 0.8, and currently it is 2.9     Acute respiratory failure ([facility]) 12/5/2013    Adverse effect of anesthesia     difficulty waking up    Arthritis     Back pain     CAD (coronary artery disease)     CAP (community acquired pneumonia) 12/7/2013    COPD exacerbation ([facility]) 6/8/2017    Oxygen at 3lpm continuously    CVA (cerebral vascular accident) ([facility]) 03/19/2020    left MCA with right sided weakness- S/P TPA    Cystitis     Diabetes ([facility])     Difficult intubation 2005    with spine surgery at [facility]    Hyperlipidemia     on statin    Hypertension     Hypoproteinemia ([facility]) 12/11/2013    Hypotension, unspecified 12/5/2013    Hypoxemia 12/20/2013    follow up overnight oximetry on room air 3/2014 - resolved.    Ill-defined condition     hx of IC     Migraine headache     Myalgia     Pleural effusion 12/17/2013    Left: 450 ml of fluid was obtained       Positive occult stool blood test 12/9/2013    Psychiatric disorder     Respiratory failure ([facility]) 12/2013    required

## 2024-04-29 NOTE — TELEPHONE ENCOUNTER
Prednisone 20 mg - 2 po q am pc for 3 days, 1 and 1/2 for 3 days, 1 for 3 days, 1/2 for 3 days, then stop or as directed.    Zpak.    I sent both in.    If she does not improve, needs to be seen.

## 2024-04-29 NOTE — TELEPHONE ENCOUNTER
Last seen: 4/4/24  Hx: severe COPD, chronic resp fail, bronchiectasis, nodule, emphysema, smoker    Last visit provided tessalon & reinforced smoking cessation.   Continue protonix & other reflux prevention strategies.   Contacted patient regarding productive cough w/ colored mucous, green, brown etc; feels like process started with allergies but has progressed; doesn't feel as bad as when she has had bronchitis; definitely has some drainage and coughing gets started which makes her vomit; some \"night fevers\"; has been getting worse since Thursday; using nebulizer every 4 hours, duonebs & albuterol; asking for antibiotics & steroids, wants to start ASAP before \"infection sets in\"  Confirmed local pharmacy.

## 2024-04-29 NOTE — TELEPHONE ENCOUNTER
TRIAGE CALL      Complaint: Coughing up colored mucus  Cough: yes  Productive:  yes  Bloody Sputum:  no  Increased SOB/Wheezing:  yes  Duration: 4 days  Fever/Chills: yes  OTC Meds tried: Ibuprofen

## 2024-04-30 ENCOUNTER — OFFICE VISIT (OUTPATIENT)
Dept: NEUROLOGY | Age: 64
End: 2024-04-30
Payer: COMMERCIAL

## 2024-04-30 VITALS
HEIGHT: 67 IN | DIASTOLIC BLOOD PRESSURE: 60 MMHG | WEIGHT: 118 LBS | SYSTOLIC BLOOD PRESSURE: 102 MMHG | OXYGEN SATURATION: 94 % | BODY MASS INDEX: 18.52 KG/M2 | HEART RATE: 88 BPM

## 2024-04-30 DIAGNOSIS — G44.52 NEW DAILY PERSISTENT HEADACHE: ICD-10-CM

## 2024-04-30 DIAGNOSIS — G44.52 NEW DAILY PERSISTENT HEADACHE: Primary | ICD-10-CM

## 2024-04-30 LAB — ERYTHROCYTE [SEDIMENTATION RATE] IN BLOOD: 7 MM/HR (ref 0–30)

## 2024-04-30 PROCEDURE — 3078F DIAST BP <80 MM HG: CPT | Performed by: PHYSICAL THERAPIST

## 2024-04-30 PROCEDURE — 99214 OFFICE O/P EST MOD 30 MIN: CPT | Performed by: PHYSICAL THERAPIST

## 2024-04-30 PROCEDURE — 3074F SYST BP LT 130 MM HG: CPT | Performed by: PHYSICAL THERAPIST

## 2024-04-30 RX ORDER — TOPIRAMATE 50 MG/1
50 TABLET, FILM COATED ORAL NIGHTLY
Qty: 15 TABLET | Refills: 0 | Status: SHIPPED | OUTPATIENT
Start: 2024-04-30

## 2024-04-30 ASSESSMENT — ENCOUNTER SYMPTOMS
NAUSEA: 1
PHOTOPHOBIA: 1
SINUS PAIN: 0
RESPIRATORY NEGATIVE: 1
VOMITING: 0
EYE PAIN: 0
SINUS PRESSURE: 0

## 2024-05-01 ENCOUNTER — TELEPHONE (OUTPATIENT)
Dept: NEUROLOGY | Age: 64
End: 2024-05-01

## 2024-05-01 NOTE — TELEPHONE ENCOUNTER
Patient called yesterday right before 430 pm very upset because she doesn't know why she is being told to take topamax because of her history suicidal thoughts in the past. Wanted be to have NGOZI Alberts to call her because she will not be taking this medication

## 2024-05-03 LAB — CRP SERPL HS-MCNC: 104 MG/L (ref 0–3)

## 2024-05-05 RX ORDER — PREDNISONE 20 MG/1
20 TABLET ORAL 2 TIMES DAILY
Qty: 35 TABLET | Refills: 0 | Status: SHIPPED | OUTPATIENT
Start: 2024-05-05

## 2024-05-07 RX ORDER — TOPIRAMATE 50 MG/1
50 TABLET, FILM COATED ORAL
Qty: 90 TABLET | Refills: 1 | OUTPATIENT
Start: 2024-05-07

## 2024-06-05 RX ORDER — CITALOPRAM 40 MG/1
40 TABLET ORAL DAILY
Qty: 90 TABLET | Refills: 1 | Status: SHIPPED | OUTPATIENT
Start: 2024-06-05

## 2024-07-10 ENCOUNTER — OFFICE VISIT (OUTPATIENT)
Age: 64
End: 2024-07-10
Payer: COMMERCIAL

## 2024-07-10 VITALS
WEIGHT: 118 LBS | HEART RATE: 84 BPM | BODY MASS INDEX: 18.76 KG/M2 | DIASTOLIC BLOOD PRESSURE: 76 MMHG | SYSTOLIC BLOOD PRESSURE: 118 MMHG

## 2024-07-10 DIAGNOSIS — R00.2 PALPITATIONS: Primary | ICD-10-CM

## 2024-07-10 DIAGNOSIS — I25.10 ASCVD (ARTERIOSCLEROTIC CARDIOVASCULAR DISEASE): ICD-10-CM

## 2024-07-10 DIAGNOSIS — Z86.73 PERSONAL HISTORY OF TRANSIENT ISCHEMIC ATTACK (TIA), AND CEREBRAL INFARCTION WITHOUT RESIDUAL DEFICITS: ICD-10-CM

## 2024-07-10 DIAGNOSIS — J44.9 STAGE 4 VERY SEVERE COPD BY GOLD CLASSIFICATION (HCC): ICD-10-CM

## 2024-07-10 DIAGNOSIS — I10 ESSENTIAL HYPERTENSION: ICD-10-CM

## 2024-07-10 PROCEDURE — 93000 ELECTROCARDIOGRAM COMPLETE: CPT | Performed by: INTERNAL MEDICINE

## 2024-07-10 PROCEDURE — 3074F SYST BP LT 130 MM HG: CPT | Performed by: INTERNAL MEDICINE

## 2024-07-10 PROCEDURE — 99214 OFFICE O/P EST MOD 30 MIN: CPT | Performed by: INTERNAL MEDICINE

## 2024-07-10 PROCEDURE — 3078F DIAST BP <80 MM HG: CPT | Performed by: INTERNAL MEDICINE

## 2024-07-10 RX ORDER — CLOPIDOGREL BISULFATE 75 MG/1
TABLET ORAL
Qty: 90 TABLET | Refills: 3 | Status: SHIPPED | OUTPATIENT
Start: 2024-07-10

## 2024-07-10 ASSESSMENT — ENCOUNTER SYMPTOMS
COLOR CHANGE: 0
CHEST TIGHTNESS: 0
DIARRHEA: 0
SPUTUM PRODUCTION: 0
BOWEL INCONTINENCE: 0
WHEEZING: 0
HEMATEMESIS: 0
ABDOMINAL PAIN: 0
BLURRED VISION: 0
HEMATOCHEZIA: 0
SHORTNESS OF BREATH: 1
HOARSE VOICE: 0
ORTHOPNEA: 0

## 2024-07-10 NOTE — PROGRESS NOTES
[facility] CARDIOLOGY  [address] [place] [address], [address]  [place] [address]  PHONE: [phone]        07/10/24        NAME:  Lisa Serrano  : 1960  MRN: [id number]       SUBJECTIVE:   Lisa Serrano is a 64 y.o. female seen for a follow up visit regarding the following: COPD,CAD,CVA,PSVT,and primary hypertension.She reports doing ok with rare angina.She states that she stopped Imdur due to symptomatic hypotension.    Chief Complaint   Patient presents with    6 Month Follow-Up    ASCVD       HPI:    Coronary Artery Disease  Presents for follow-up visit. Symptoms include chest pain (rare) and shortness of breath. Pertinent negatives include no chest pressure, chest tightness, dizziness, leg swelling, muscle weakness, palpitations or weight gain. The symptoms have been stable.       Past Medical History, Past Surgical History, Family history, Social History, and Medications were all reviewed with the patient today and updated as necessary.         Current Outpatient Medications:     clopidogrel (PLAVIX) 75 MG tablet, TAKE 1 TABLET BY MOUTH DAILY. INDICATIONS: PREVENTION FOR A BLOOD CLOT GOING TO THE BRAIN, Disp: 90 tablet, Rfl: 3    citalopram (CELEXA) 40 MG tablet, TAKE 1 TABLET BY MOUTH EVERY DAY, Disp: 90 tablet, Rfl: 1    benzonatate (TESSALON) 200 MG capsule, 1 po tid prn cough, Disp: 60 capsule, Rfl: 1    albuterol sulfate HFA (PROVENTIL;VENTOLIN;PROAIR) 108 (90 Base) MCG/ACT inhaler, 2 puffs 4 times daily prn shortness of breath or wheezing, Disp: 18 g, Rfl: 11    traMADol (ULTRAM) 50 MG tablet, Take 1 tablet by mouth every 8 hours as needed for Pain., Disp: , Rfl:     famotidine (PEPCID) 40 MG tablet, Take 1 tablet by mouth nightly as needed, Disp: , Rfl:     vitamin C (ASCORBIC ACID) 500 MG tablet, Take 1 tablet by mouth daily, Disp: , Rfl:     atorvastatin (LIPITOR) 40 MG tablet, TAKE 1 TABLET BY MOUTH EVERY DAY, Disp: 90 tablet, Rfl: 2    dilTIAZem (DILACOR XR) 180 MG extended release capsule, Take

## 2024-07-15 ENCOUNTER — OFFICE VISIT (OUTPATIENT)
Dept: INTERNAL MEDICINE CLINIC | Facility: CLINIC | Age: 64
End: 2024-07-15
Payer: COMMERCIAL

## 2024-07-15 VITALS
WEIGHT: 117.4 LBS | SYSTOLIC BLOOD PRESSURE: 124 MMHG | BODY MASS INDEX: 18.43 KG/M2 | HEIGHT: 67 IN | DIASTOLIC BLOOD PRESSURE: 70 MMHG

## 2024-07-15 DIAGNOSIS — R05.3 CHRONIC COUGH: ICD-10-CM

## 2024-07-15 DIAGNOSIS — I10 ESSENTIAL HYPERTENSION: ICD-10-CM

## 2024-07-15 DIAGNOSIS — R73.9 ELEVATED BLOOD SUGAR: ICD-10-CM

## 2024-07-15 DIAGNOSIS — E78.2 MIXED HYPERLIPIDEMIA: ICD-10-CM

## 2024-07-15 DIAGNOSIS — Z79.899 ON STATIN THERAPY: ICD-10-CM

## 2024-07-15 DIAGNOSIS — M70.71 BURSITIS OF RIGHT HIP, UNSPECIFIED BURSA: ICD-10-CM

## 2024-07-15 DIAGNOSIS — J44.9 STAGE 4 VERY SEVERE COPD BY GOLD CLASSIFICATION (HCC): Primary | ICD-10-CM

## 2024-07-15 DIAGNOSIS — M51.36 DEGENERATION OF LUMBAR INTERVERTEBRAL DISC: ICD-10-CM

## 2024-07-15 DIAGNOSIS — G89.4 CHRONIC PAIN SYNDROME: ICD-10-CM

## 2024-07-15 DIAGNOSIS — M15.9 PRIMARY OSTEOARTHRITIS INVOLVING MULTIPLE JOINTS: ICD-10-CM

## 2024-07-15 DIAGNOSIS — J44.9 STAGE 4 VERY SEVERE COPD BY GOLD CLASSIFICATION (HCC): ICD-10-CM

## 2024-07-15 LAB
ALBUMIN SERPL-MCNC: 4.1 G/DL (ref 3.2–4.6)
ALBUMIN/GLOB SERPL: 1.1 (ref 1–1.9)
ALP SERPL-CCNC: 95 U/L (ref 35–104)
ALT SERPL-CCNC: 21 U/L (ref 12–65)
ANION GAP SERPL CALC-SCNC: 12 MMOL/L (ref 9–18)
AST SERPL-CCNC: 36 U/L (ref 15–37)
BASOPHILS # BLD: 0.1 K/UL (ref 0–0.2)
BASOPHILS NFR BLD: 1 % (ref 0–2)
BILIRUB SERPL-MCNC: 0.5 MG/DL (ref 0–1.2)
BUN SERPL-MCNC: 13 MG/DL (ref 8–23)
CALCIUM SERPL-MCNC: 9.7 MG/DL (ref 8.8–10.2)
CHLORIDE SERPL-SCNC: 100 MMOL/L (ref 98–107)
CHOLEST SERPL-MCNC: 122 MG/DL (ref 0–200)
CO2 SERPL-SCNC: 25 MMOL/L (ref 20–28)
CREAT SERPL-MCNC: 0.73 MG/DL (ref 0.6–1.1)
DIFFERENTIAL METHOD BLD: ABNORMAL
EOSINOPHIL # BLD: 0.2 K/UL (ref 0–0.8)
EOSINOPHIL NFR BLD: 2 % (ref 0.5–7.8)
ERYTHROCYTE [DISTWIDTH] IN BLOOD BY AUTOMATED COUNT: 14 % (ref 11.9–14.6)
EST. AVERAGE GLUCOSE BLD GHB EST-MCNC: 155 MG/DL
GLOBULIN SER CALC-MCNC: 3.7 G/DL (ref 2.3–3.5)
GLUCOSE SERPL-MCNC: 115 MG/DL (ref 70–99)
HBA1C MFR BLD: 7 % (ref 0–5.6)
HCT VFR BLD AUTO: 51.2 % (ref 35.8–46.3)
HDLC SERPL-MCNC: 54 MG/DL (ref 40–60)
HDLC SERPL: 2.3 (ref 0–5)
HGB BLD-MCNC: 16.4 G/DL (ref 11.7–15.4)
IMM GRANULOCYTES # BLD AUTO: 0 K/UL (ref 0–0.5)
IMM GRANULOCYTES NFR BLD AUTO: 0 % (ref 0–5)
LDLC SERPL CALC-MCNC: 55 MG/DL (ref 0–100)
LYMPHOCYTES # BLD: 1.5 K/UL (ref 0.5–4.6)
LYMPHOCYTES NFR BLD: 17 % (ref 13–44)
MCH RBC QN AUTO: 30.6 PG (ref 26.1–32.9)
MCHC RBC AUTO-ENTMCNC: 32 G/DL (ref 31.4–35)
MCV RBC AUTO: 95.5 FL (ref 82–102)
MONOCYTES # BLD: 0.6 K/UL (ref 0.1–1.3)
MONOCYTES NFR BLD: 7 % (ref 4–12)
NEUTS SEG # BLD: 6.3 K/UL (ref 1.7–8.2)
NEUTS SEG NFR BLD: 73 % (ref 43–78)
NRBC # BLD: 0 K/UL (ref 0–0.2)
PLATELET # BLD AUTO: 286 K/UL (ref 150–450)
PMV BLD AUTO: 8.8 FL (ref 9.4–12.3)
POTASSIUM SERPL-SCNC: 4.5 MMOL/L (ref 3.5–5.1)
PROT SERPL-MCNC: 7.8 G/DL (ref 6.3–8.2)
RBC # BLD AUTO: 5.36 M/UL (ref 4.05–5.2)
SODIUM SERPL-SCNC: 137 MMOL/L (ref 136–145)
TRIGL SERPL-MCNC: 65 MG/DL (ref 0–150)
VLDLC SERPL CALC-MCNC: 13 MG/DL (ref 6–23)
WBC # BLD AUTO: 8.8 K/UL (ref 4.3–11.1)

## 2024-07-15 PROCEDURE — 3078F DIAST BP <80 MM HG: CPT | Performed by: INTERNAL MEDICINE

## 2024-07-15 PROCEDURE — 3074F SYST BP LT 130 MM HG: CPT | Performed by: INTERNAL MEDICINE

## 2024-07-15 PROCEDURE — 99214 OFFICE O/P EST MOD 30 MIN: CPT | Performed by: INTERNAL MEDICINE

## 2024-07-15 RX ORDER — BENZONATATE 200 MG/1
CAPSULE ORAL
Qty: 60 CAPSULE | Refills: 1 | Status: SHIPPED | OUTPATIENT
Start: 2024-07-15

## 2024-07-15 RX ORDER — TRAMADOL HYDROCHLORIDE 50 MG/1
50 TABLET ORAL 2 TIMES DAILY PRN
Qty: 60 TABLET | Refills: 5 | Status: SHIPPED | OUTPATIENT
Start: 2024-07-15 | End: 2025-01-11

## 2024-07-15 SDOH — ECONOMIC STABILITY: FOOD INSECURITY: WITHIN THE PAST 12 MONTHS, THE FOOD YOU BOUGHT JUST DIDN'T LAST AND YOU DIDN'T HAVE MONEY TO GET MORE.: NEVER TRUE

## 2024-07-15 SDOH — ECONOMIC STABILITY: FOOD INSECURITY: WITHIN THE PAST 12 MONTHS, YOU WORRIED THAT YOUR FOOD WOULD RUN OUT BEFORE YOU GOT MONEY TO BUY MORE.: NEVER TRUE

## 2024-07-15 SDOH — ECONOMIC STABILITY: INCOME INSECURITY: HOW HARD IS IT FOR YOU TO PAY FOR THE VERY BASICS LIKE FOOD, HOUSING, MEDICAL CARE, AND HEATING?: NOT HARD AT ALL

## 2024-07-15 ASSESSMENT — ENCOUNTER SYMPTOMS
SHORTNESS OF BREATH: 1
COUGH: 1
BACK PAIN: 1
WHEEZING: 0

## 2024-07-15 NOTE — PROGRESS NOTES
Respiratory:  Positive for cough and shortness of breath. Negative for wheezing.    Musculoskeletal:  Positive for arthralgias and back pain.   Psychiatric/Behavioral:  Positive for dysphoric mood.    All other systems reviewed and are negative.    Vitals:  /70   Ht 1.689 m (5' 6.5\")   Wt 53.3 kg (117 lb 6.4 oz)   BMI 18.66 kg/m²     Physical Exam:  Physical Exam  Vitals reviewed.   Constitutional:       Appearance: Normal appearance.      Comments: Appears underweight and frail   HENT:      Head: Normocephalic and atraumatic.   Eyes:      Extraocular Movements: Extraocular movements intact.      Pupils: Pupils are equal, round, and reactive to light.   Cardiovascular:      Rate and Rhythm: Normal rate and regular rhythm.      Heart sounds: Normal heart sounds.   Pulmonary:      Effort: Pulmonary effort is normal.      Breath sounds: Normal breath sounds.      Comments: Diminished breath sounds bilateral  Musculoskeletal:         General: Deformity present.      Cervical back: Normal range of motion and neck supple.      Comments: DJD of multiple joints   Skin:     General: Skin is warm and dry.   Neurological:      Mental Status: She is alert and oriented to person, place, and time.      Motor: Weakness present.   Psychiatric:         Mood and Affect: Mood normal.         Behavior: Behavior normal.         Thought Content: Thought content normal.         Judgment: Judgment normal.        Assessment/Plan:   Lisa was seen today for follow-up.    Diagnoses and all orders for this visit:    Stage 4 very severe COPD by GOLD classification (HCC)  -     CBC with Auto Differential; Future  -     Comprehensive Metabolic Panel; Future    Degeneration of lumbar intervertebral disc  -     benzonatate (TESSALON) 200 MG capsule; 1 po tid prn cough  -     traMADol (ULTRAM) 50 MG tablet; Take 1 tablet by mouth 2 times daily as needed for Pain for up to 180 days. Max Daily Amount: 100 mg    Chronic pain

## 2024-07-22 ASSESSMENT — ENCOUNTER SYMPTOMS
EYE PAIN: 0
NAUSEA: 1
PHOTOPHOBIA: 1
RESPIRATORY NEGATIVE: 1
VOMITING: 0
SINUS PRESSURE: 0
SINUS PAIN: 0

## 2024-07-22 NOTE — PROGRESS NOTES
[facility] Neurology [address], [address]  [place] [address]  [phone]      No chief complaint on file.      HPI  Lisa Serrano is a 64 y.o. female who presents for f/u of her headaches.       Interval History  Good relief with steroids.  Nothing has been helping very much.  Ever since she finished a 2-week steroid taper her headaches improved to happening just about every other day.  They respond well to NSAIDs.  She has no interest in pursuing other forms of workup including temporal artery biopsies.      Past Medical History:   Diagnosis Date    Acute renal failure ([facility]) 12/5/2013    Baseline creatinine was 0.8, and currently it is 2.9     Acute respiratory failure ([facility]) 12/5/2013    Adverse effect of anesthesia     difficulty waking up    Arthritis     Back pain     CAD (coronary artery disease)     CAP (community acquired pneumonia) 12/7/2013    COPD exacerbation ([facility]) 6/8/2017    Oxygen at 3lpm continuously    CVA (cerebral vascular accident) ([facility]) 03/19/2020    left MCA with right sided weakness- S/P TPA    Cystitis     Diabetes ([facility])     Difficult intubation 2005    with spine surgery at [facility]    Hyperlipidemia     on statin    Hypertension     Hypoproteinemia ([facility]) 12/11/2013    Hypotension, unspecified 12/5/2013    Hypoxemia 12/20/2013    follow up overnight oximetry on room air 3/2014 - resolved.    Ill-defined condition     hx of IC     Migraine headache     Myalgia     Pleural effusion 12/17/2013    Left: 450 ml of fluid was obtained       Positive occult stool blood test 12/9/2013    Psychiatric disorder     Respiratory failure ([facility]) 12/2013    required intubation    Sepsis ([facility]) 12/7/2013    Septicemia ([facility]) Dec 2013    no BP, pneumonia, \"Everything was failing\"- on vent 12/6-12/17    Unspecified adverse effect of anesthesia     took a long time to wake up 2005    Upper GI bleed 12/10/2013    GI evaluated        Past Surgical History:   Procedure Laterality Date    CARDIAC

## 2024-07-23 ENCOUNTER — OFFICE VISIT (OUTPATIENT)
Dept: NEUROLOGY | Age: 64
End: 2024-07-23
Payer: COMMERCIAL

## 2024-07-23 DIAGNOSIS — G44.52 NEW DAILY PERSISTENT HEADACHE: Primary | ICD-10-CM

## 2024-07-23 PROCEDURE — 99214 OFFICE O/P EST MOD 30 MIN: CPT | Performed by: PHYSICAL THERAPIST

## 2024-07-30 ENCOUNTER — NURSE ONLY (OUTPATIENT)
Dept: INTERNAL MEDICINE CLINIC | Facility: CLINIC | Age: 64
End: 2024-07-30
Payer: COMMERCIAL

## 2024-07-30 DIAGNOSIS — M70.71 BURSITIS OF RIGHT HIP, UNSPECIFIED BURSA: Primary | ICD-10-CM

## 2024-07-30 PROCEDURE — 96372 THER/PROPH/DIAG INJ SC/IM: CPT | Performed by: INTERNAL MEDICINE

## 2024-07-30 RX ORDER — METHYLPREDNISOLONE SODIUM SUCCINATE 40 MG/ML
40 INJECTION, POWDER, LYOPHILIZED, FOR SOLUTION INTRAMUSCULAR; INTRAVENOUS ONCE
Status: COMPLETED | OUTPATIENT
Start: 2024-07-30 | End: 2024-07-30

## 2024-07-30 RX ADMIN — METHYLPREDNISOLONE SODIUM SUCCINATE 40 MG: 40 INJECTION, POWDER, LYOPHILIZED, FOR SOLUTION INTRAMUSCULAR; INTRAVENOUS at 09:36

## 2024-07-30 NOTE — PROGRESS NOTES
Pt came in for a nursing visit with c/o right hip pain. After obtaining consent and per orders of Dr Charlotte Pro, injection of Solu Medrol 40 mg given IM in right dorsogluteal. Pt tolerated injection well.

## 2024-09-13 ENCOUNTER — HOSPITAL ENCOUNTER (OUTPATIENT)
Dept: CT IMAGING | Age: 64
Discharge: HOME | End: 2024-09-15
Payer: COMMERCIAL

## 2024-09-13 DIAGNOSIS — F17.219 CIGARETTE NICOTINE DEPENDENCE WITH NICOTINE-INDUCED DISORDER: ICD-10-CM

## 2024-09-13 PROCEDURE — 71271 CT THORAX LUNG CANCER SCR C-: CPT

## 2024-09-17 ENCOUNTER — TELEPHONE (OUTPATIENT)
Dept: PULMONOLOGY | Age: 64
End: 2024-09-17

## 2024-10-09 NOTE — PROGRESS NOTES
Name:  Lisa Serrano  YOB: 1960   MRN: [id number]      Office Visit: 10/10/2024       [place] Office      Assessment & Plan    (Medical Decision Making)    Impression: 64 y.o. female     1. Stage 4 very severe COPD by GOLD classification (HCC)  --continue Duoneb qid and Pulmicort bid.    2. Chronic respiratory failure with hypoxia  --continue O2 at 2 lpm continuously.  Reports benefit and compliance.    3. Immunization counseling  --declines flu vaccine.  Will arrange for updated Prevnar at local pharmacy.  - pneumococcal 20-valent conjugat (PREVNAR) 0.5 ML MOSHE inj; Inject 0.5 mLs into the muscle once for 1 dose  Dispense: 0.5 mL; Refill: 0    4. Lung nodules  --small nodules, stable for > 2 years.    5. Cigarette nicotine dependence without complication  --1 ppd smoker.  Is not interested in quitting at this point.  We will need to arrange LDCT due in  at next visit.    Orders Placed This Encounter   Medications    budesonide (PULMICORT) 0.5 MG/2ML nebulizer suspension     Si vial via nebulizer twice daily, rinse mouth after use. Dx code j44.9     Dispense:  60 each     Refill:  11    benzonatate (TESSALON) 200 MG capsule     Sig: Take 1 capsule by mouth nightly as needed for Cough 1 po tid prn cough     Dispense:  30 capsule     Refill:  6    ipratropium 0.5 mg-albuterol 2.5 mg (DUONEB) 0.5-2.5 (3) MG/3ML SOLN nebulizer solution     Sig: Take 3 mLs by nebulization 4 times daily Dx code j44.9     Dispense:  360 mL     Refill:  11    pneumococcal 20-valent conjugat (PREVNAR) 0.5 ML MOSHE inj     Sig: Inject 0.5 mLs into the muscle once for 1 dose     Dispense:  0.5 mL     Refill:  0     No orders of the defined types were placed in this encounter.    Follow-up and Dispositions    Return in about 6 months (around 4/10/2025) for RAUL Aden,  office, Arrive 15 minutes prior to appt time.     Collaborating physician is Dr. Maynor Martínez.    MARCELLO Rubio -

## 2024-10-10 ENCOUNTER — OFFICE VISIT (OUTPATIENT)
Age: 64
End: 2024-10-10
Payer: COMMERCIAL

## 2024-10-10 VITALS
RESPIRATION RATE: 18 BRPM | WEIGHT: 117.5 LBS | SYSTOLIC BLOOD PRESSURE: 118 MMHG | HEIGHT: 66 IN | OXYGEN SATURATION: 97 % | BODY MASS INDEX: 18.88 KG/M2 | TEMPERATURE: 97.2 F | HEART RATE: 87 BPM | DIASTOLIC BLOOD PRESSURE: 70 MMHG

## 2024-10-10 DIAGNOSIS — J96.11 CHRONIC RESPIRATORY FAILURE WITH HYPOXIA: ICD-10-CM

## 2024-10-10 DIAGNOSIS — Z71.85 IMMUNIZATION COUNSELING: ICD-10-CM

## 2024-10-10 DIAGNOSIS — F17.210 CIGARETTE NICOTINE DEPENDENCE WITHOUT COMPLICATION: ICD-10-CM

## 2024-10-10 DIAGNOSIS — J44.9 STAGE 4 VERY SEVERE COPD BY GOLD CLASSIFICATION (HCC): Primary | ICD-10-CM

## 2024-10-10 DIAGNOSIS — R91.8 LUNG NODULES: ICD-10-CM

## 2024-10-10 PROCEDURE — 3078F DIAST BP <80 MM HG: CPT | Performed by: NURSE PRACTITIONER

## 2024-10-10 PROCEDURE — 99214 OFFICE O/P EST MOD 30 MIN: CPT | Performed by: NURSE PRACTITIONER

## 2024-10-10 PROCEDURE — 3074F SYST BP LT 130 MM HG: CPT | Performed by: NURSE PRACTITIONER

## 2024-10-10 PROCEDURE — G2211 COMPLEX E/M VISIT ADD ON: HCPCS | Performed by: NURSE PRACTITIONER

## 2024-10-10 RX ORDER — BUDESONIDE 0.5 MG/2ML
INHALANT ORAL
Qty: 60 EACH | Refills: 11 | Status: SHIPPED | OUTPATIENT
Start: 2024-10-10

## 2024-10-10 RX ORDER — IPRATROPIUM BROMIDE AND ALBUTEROL SULFATE 2.5; .5 MG/3ML; MG/3ML
3 SOLUTION RESPIRATORY (INHALATION) 4 TIMES DAILY
Qty: 360 ML | Refills: 11 | Status: SHIPPED | OUTPATIENT
Start: 2024-10-10

## 2024-10-10 RX ORDER — BENZONATATE 200 MG/1
200 CAPSULE ORAL NIGHTLY PRN
Qty: 30 CAPSULE | Refills: 6 | Status: SHIPPED | OUTPATIENT
Start: 2024-10-10

## 2024-10-18 RX ORDER — CITALOPRAM HYDROBROMIDE 40 MG/1
40 TABLET ORAL DAILY
Qty: 90 TABLET | Refills: 0 | Status: SHIPPED | OUTPATIENT
Start: 2024-10-18

## 2024-10-18 NOTE — TELEPHONE ENCOUNTER
Requested Prescriptions     Pending Prescriptions Disp Refills    citalopram (CELEXA) 40 MG tablet 90 tablet 0     Sig: Take 1 tablet by mouth daily

## 2025-01-08 ENCOUNTER — TELEPHONE (OUTPATIENT)
Dept: PULMONOLOGY | Age: 65
End: 2025-01-08

## 2025-01-08 NOTE — TELEPHONE ENCOUNTER
TRIAGE CALL      Complaint: upper respiratory /sob  Cough: cough  Productive:  yes thick green and brown  Bloody Sputum:  no  Increased SOB/Wheezing:  yes both  Duration: a week   Fever/Chills: yes both   OTC Meds tried: no    It has moved to her chest . Has some hurting in her back left below shoulder blade

## 2025-01-09 ENCOUNTER — TELEMEDICINE (OUTPATIENT)
Dept: INTERNAL MEDICINE CLINIC | Facility: CLINIC | Age: 65
End: 2025-01-09
Payer: COMMERCIAL

## 2025-01-09 DIAGNOSIS — J44.9 STAGE 4 VERY SEVERE COPD BY GOLD CLASSIFICATION (HCC): ICD-10-CM

## 2025-01-09 DIAGNOSIS — R50.9 FEVER AND CHILLS: ICD-10-CM

## 2025-01-09 DIAGNOSIS — J06.9 ACUTE URI: Primary | ICD-10-CM

## 2025-01-09 PROCEDURE — 99213 OFFICE O/P EST LOW 20 MIN: CPT | Performed by: NURSE PRACTITIONER

## 2025-01-09 RX ORDER — PREDNISONE 20 MG/1
TABLET ORAL
Qty: 13 TABLET | Refills: 0 | Status: SHIPPED | OUTPATIENT
Start: 2025-01-09 | End: 2025-01-17

## 2025-01-09 ASSESSMENT — ENCOUNTER SYMPTOMS
SHORTNESS OF BREATH: 1
COUGH: 1
WHEEZING: 0

## 2025-01-09 NOTE — TELEPHONE ENCOUNTER
Last seen: 10/10/24  Hx: severe COPD, chronic resp fail, nodules, bronchiectasis, CAD, chronic back pain, hyperlipid, HTN, anxiety/depression, migraines    Patient call reporting issues w/ upper respiratory & sob; cough is productive of thick, green & brown sputum, wheezing, fever & chills, symptoms for a week, progressed to chest,, some pain in back below L shoulder blade.    Contacted patient regarding symptoms, states she was able to contact her PCP and have a virtual visit, will have Rx of augmentin & steroids. Reassured that she did the right thing, if further needs will call back.

## 2025-01-09 NOTE — PROGRESS NOTES
Lisa Serrano, was evaluated through a synchronous (real-time) audio-video encounter. The patient (or guardian if applicable) is aware that this is a billable service, which includes applicable co-pays. This Virtual Visit was conducted with patient's (and/or legal guardian's) consent. Patient identification was verified, and a caregiver was present when appropriate.   The patient was located at Home: [address] [place] [address] [facility] [address]  Provider was located at Facility (Appt Dept): [address] [facility] [address]  [place] [address]  Confirm you are appropriately licensed, registered, or certified to deliver care in the state where the patient is located as indicated above. If you are not or unsure, please re-schedule the visit: Yes, I confirm.     Lisa Serrano (:  1960) is a Established patient, presenting virtually for evaluation of the following:      Below is the assessment and plan developed based on review of pertinent history, physical exam, labs, studies, and medications.         Assessment & Plan  Acute URI     She has been using her inhalers and oxygen at home but continues to experience significant symptoms, including waking up in the middle of the night unable to breathe.   Augmentin as prescribed  - directions for use and side effects discussed.  She is also advised to purchase a new pulse oximeter to monitor her oxygen levels at home. If her oxygen saturation falls below 90, she should seek immediate medical attention at the emergency room.  Orders:    amoxicillin-clavulanate (AUGMENTIN) 875-125 MG per tablet; Take 1 tablet by mouth 2 times daily for 10 days    Stage 4 very severe COPD by GOLD classification (HCC)     Prednisone taper as prescribed  - directions for use and side effects discussed.  Continue budesonide and DuoNeb.  Continue albuterol.  Continue oxygen.  Orders:    predniSONE (DELTASONE) 20 MG tablet; 3 tabs daily x 2 days; 2 tabs daily x 2 days; 1 tab

## 2025-01-09 NOTE — ASSESSMENT & PLAN NOTE
Prednisone taper as prescribed  - directions for use and side effects discussed.  Continue budesonide and DuoNeb.  Continue albuterol.  Continue oxygen.  Orders:    predniSONE (DELTASONE) 20 MG tablet; 3 tabs daily x 2 days; 2 tabs daily x 2 days; 1 tab daily x 2 days; 1/2 tab daily x 2 days; then d/c

## 2025-01-10 ENCOUNTER — OFFICE VISIT (OUTPATIENT)
Age: 65
End: 2025-01-10

## 2025-01-10 VITALS
OXYGEN SATURATION: 96 % | DIASTOLIC BLOOD PRESSURE: 60 MMHG | BODY MASS INDEX: 18.14 KG/M2 | SYSTOLIC BLOOD PRESSURE: 120 MMHG | HEIGHT: 66 IN | HEART RATE: 64 BPM | WEIGHT: 112.9 LBS | RESPIRATION RATE: 16 BRPM

## 2025-01-10 DIAGNOSIS — J44.9 STAGE 4 VERY SEVERE COPD BY GOLD CLASSIFICATION (HCC): ICD-10-CM

## 2025-01-10 DIAGNOSIS — I25.10 ASCVD (ARTERIOSCLEROTIC CARDIOVASCULAR DISEASE): ICD-10-CM

## 2025-01-10 DIAGNOSIS — I10 ESSENTIAL HYPERTENSION: Primary | ICD-10-CM

## 2025-01-10 ASSESSMENT — ENCOUNTER SYMPTOMS
ORTHOPNEA: 0
BLURRED VISION: 0
CHEST TIGHTNESS: 0
ABDOMINAL PAIN: 0
SPUTUM PRODUCTION: 1
HEMATEMESIS: 0
HOARSE VOICE: 0
WHEEZING: 0
COLOR CHANGE: 0
DIARRHEA: 0
SHORTNESS OF BREATH: 1
COUGH: 1
BOWEL INCONTINENCE: 0
HEMATOCHEZIA: 0

## 2025-01-10 NOTE — PROGRESS NOTES
Negative for abdominal pain, bowel incontinence, diarrhea, hematemesis and hematochezia.   Genitourinary:  Negative for dysuria, frequency and hematuria.   Neurological:  Negative for dizziness, focal weakness, light-headedness, loss of balance, numbness, sensory change and weakness.   Psychiatric/Behavioral:  Negative for altered mental status and memory loss.            PHYSICAL EXAM:   /60   Pulse 64   Resp 16   Ht 1.676 m (5' 6\")   Wt 51.2 kg (112 lb 14.4 oz)   SpO2 96%   BMI 18.22 kg/m²      Physical Exam  Constitutional:       Appearance: Normal appearance.      Comments: She is on continuous O2.   HENT:      Head: Normocephalic and atraumatic.      Nose: Nose normal.   Eyes:      Extraocular Movements: Extraocular movements intact.      Pupils: Pupils are equal, round, and reactive to light.   Neck:      Vascular: No carotid bruit.   Cardiovascular:      Rate and Rhythm: Regular rhythm.      Pulses: Normal pulses.      Heart sounds: No murmur heard.  Pulmonary:      Effort: Pulmonary effort is normal.      Breath sounds: Normal breath sounds.      Comments: Distant BS  Abdominal:      General: Abdomen is flat. Bowel sounds are normal.      Palpations: Abdomen is soft.   Musculoskeletal:         General: Normal range of motion.      Cervical back: Normal range of motion and neck supple.   Skin:     General: Skin is warm and dry.   Neurological:      General: No focal deficit present.      Mental Status: She is alert and oriented to person, place, and time.   Psychiatric:         Mood and Affect: Mood normal.         Medical problems and test results were reviewed with the patient today.     No results found for this or any previous visit (from the past 672 hour(s)).  Lab Results   Component Value Date/Time    CHOL 122 07/15/2024 12:16 PM    HDL 54 07/15/2024 12:16 PM    LDL 55 07/15/2024 12:16 PM    LDL 51.2 01/22/2024 10:31 AM    VLDL 13 07/15/2024 12:16 PM    VLDL 15 02/14/2022 10:33 AM     No

## 2025-01-20 ENCOUNTER — OFFICE VISIT (OUTPATIENT)
Dept: INTERNAL MEDICINE CLINIC | Facility: CLINIC | Age: 65
End: 2025-01-20
Payer: COMMERCIAL

## 2025-01-20 VITALS
BODY MASS INDEX: 18.26 KG/M2 | SYSTOLIC BLOOD PRESSURE: 124 MMHG | DIASTOLIC BLOOD PRESSURE: 60 MMHG | WEIGHT: 113.6 LBS | HEIGHT: 66 IN

## 2025-01-20 DIAGNOSIS — E11.9 TYPE 2 DIABETES MELLITUS WITHOUT COMPLICATION, WITHOUT LONG-TERM CURRENT USE OF INSULIN (HCC): ICD-10-CM

## 2025-01-20 DIAGNOSIS — J44.9 STAGE 4 VERY SEVERE COPD BY GOLD CLASSIFICATION (HCC): Primary | ICD-10-CM

## 2025-01-20 DIAGNOSIS — G89.4 CHRONIC PAIN SYNDROME: ICD-10-CM

## 2025-01-20 DIAGNOSIS — M51.362 DEGENERATION OF INTERVERTEBRAL DISC OF LUMBAR REGION WITH DISCOGENIC BACK PAIN AND LOWER EXTREMITY PAIN: ICD-10-CM

## 2025-01-20 DIAGNOSIS — R63.4 WEIGHT LOSS: ICD-10-CM

## 2025-01-20 DIAGNOSIS — I10 ESSENTIAL HYPERTENSION: ICD-10-CM

## 2025-01-20 DIAGNOSIS — E78.2 MIXED HYPERLIPIDEMIA: ICD-10-CM

## 2025-01-20 DIAGNOSIS — M15.0 PRIMARY OSTEOARTHRITIS INVOLVING MULTIPLE JOINTS: ICD-10-CM

## 2025-01-20 DIAGNOSIS — J44.9 STAGE 4 VERY SEVERE COPD BY GOLD CLASSIFICATION (HCC): ICD-10-CM

## 2025-01-20 LAB
ALBUMIN SERPL-MCNC: 3.7 G/DL (ref 3.2–4.6)
ALBUMIN/GLOB SERPL: 1 (ref 1–1.9)
ALP SERPL-CCNC: 91 U/L (ref 35–104)
ALT SERPL-CCNC: 24 U/L (ref 8–45)
ANION GAP SERPL CALC-SCNC: 13 MMOL/L (ref 7–16)
AST SERPL-CCNC: 27 U/L (ref 15–37)
BASOPHILS # BLD: 0.09 K/UL (ref 0–0.2)
BASOPHILS NFR BLD: 0.6 % (ref 0–2)
BILIRUB SERPL-MCNC: 0.3 MG/DL (ref 0–1.2)
BUN SERPL-MCNC: 13 MG/DL (ref 8–23)
CALCIUM SERPL-MCNC: 9.9 MG/DL (ref 8.8–10.2)
CHLORIDE SERPL-SCNC: 98 MMOL/L (ref 98–107)
CHOLEST SERPL-MCNC: 140 MG/DL (ref 0–200)
CO2 SERPL-SCNC: 28 MMOL/L (ref 20–29)
CREAT SERPL-MCNC: 0.68 MG/DL (ref 0.6–1.1)
CREAT UR-MCNC: 32.6 MG/DL (ref 28–217)
DIFFERENTIAL METHOD BLD: ABNORMAL
EOSINOPHIL # BLD: 0.14 K/UL (ref 0–0.8)
EOSINOPHIL NFR BLD: 0.9 % (ref 0.5–7.8)
ERYTHROCYTE [DISTWIDTH] IN BLOOD BY AUTOMATED COUNT: 13.7 % (ref 11.9–14.6)
EST. AVERAGE GLUCOSE BLD GHB EST-MCNC: 136 MG/DL
GLOBULIN SER CALC-MCNC: 3.7 G/DL (ref 2.3–3.5)
GLUCOSE SERPL-MCNC: 113 MG/DL (ref 70–99)
HBA1C MFR BLD: 6.4 % (ref 0–5.6)
HCT VFR BLD AUTO: 47.1 % (ref 35.8–46.3)
HDLC SERPL-MCNC: 57 MG/DL (ref 40–60)
HDLC SERPL: 2.5 (ref 0–5)
HGB BLD-MCNC: 15.2 G/DL (ref 11.7–15.4)
IMM GRANULOCYTES # BLD AUTO: 0.08 K/UL (ref 0–0.5)
IMM GRANULOCYTES NFR BLD AUTO: 0.5 % (ref 0–5)
LDLC SERPL CALC-MCNC: 61 MG/DL (ref 0–100)
LYMPHOCYTES # BLD: 1.49 K/UL (ref 0.5–4.6)
LYMPHOCYTES NFR BLD: 9.7 % (ref 13–44)
MCH RBC QN AUTO: 30.8 PG (ref 26.1–32.9)
MCHC RBC AUTO-ENTMCNC: 32.3 G/DL (ref 31.4–35)
MCV RBC AUTO: 95.3 FL (ref 82–102)
MICROALBUMIN UR-MCNC: <1.2 MG/DL (ref 0–20)
MICROALBUMIN/CREAT UR-RTO: NORMAL MG/G (ref 0–30)
MONOCYTES # BLD: 0.92 K/UL (ref 0.1–1.3)
MONOCYTES NFR BLD: 6 % (ref 4–12)
NEUTS SEG # BLD: 12.7 K/UL (ref 1.7–8.2)
NEUTS SEG NFR BLD: 82.3 % (ref 43–78)
NRBC # BLD: 0 K/UL (ref 0–0.2)
PLATELET # BLD AUTO: 423 K/UL (ref 150–450)
PMV BLD AUTO: 8.6 FL (ref 9.4–12.3)
POTASSIUM SERPL-SCNC: 4.9 MMOL/L (ref 3.5–5.1)
PROT SERPL-MCNC: 7.3 G/DL (ref 6.3–8.2)
RBC # BLD AUTO: 4.94 M/UL (ref 4.05–5.2)
SODIUM SERPL-SCNC: 138 MMOL/L (ref 136–145)
TRIGL SERPL-MCNC: 114 MG/DL (ref 0–150)
VLDLC SERPL CALC-MCNC: 23 MG/DL (ref 6–23)
WBC # BLD AUTO: 15.4 K/UL (ref 4.3–11.1)

## 2025-01-20 PROCEDURE — 3078F DIAST BP <80 MM HG: CPT | Performed by: INTERNAL MEDICINE

## 2025-01-20 PROCEDURE — 3074F SYST BP LT 130 MM HG: CPT | Performed by: INTERNAL MEDICINE

## 2025-01-20 PROCEDURE — 99214 OFFICE O/P EST MOD 30 MIN: CPT | Performed by: INTERNAL MEDICINE

## 2025-01-20 RX ORDER — PREDNISONE 10 MG/1
TABLET ORAL
Qty: 10 TABLET | Refills: 0 | Status: SHIPPED | OUTPATIENT
Start: 2025-01-20

## 2025-01-20 RX ORDER — TRAMADOL HYDROCHLORIDE 50 MG/1
50 TABLET ORAL DAILY
Qty: 30 TABLET | Refills: 2 | Status: SHIPPED | OUTPATIENT
Start: 2025-01-20 | End: 2025-07-19

## 2025-01-20 RX ORDER — TRAMADOL HYDROCHLORIDE 50 MG/1
50 TABLET ORAL 2 TIMES DAILY PRN
COMMUNITY
End: 2025-01-20 | Stop reason: SDUPTHER

## 2025-01-20 SDOH — ECONOMIC STABILITY: FOOD INSECURITY: WITHIN THE PAST 12 MONTHS, THE FOOD YOU BOUGHT JUST DIDN'T LAST AND YOU DIDN'T HAVE MONEY TO GET MORE.: NEVER TRUE

## 2025-01-20 SDOH — ECONOMIC STABILITY: FOOD INSECURITY: WITHIN THE PAST 12 MONTHS, YOU WORRIED THAT YOUR FOOD WOULD RUN OUT BEFORE YOU GOT MONEY TO BUY MORE.: NEVER TRUE

## 2025-01-20 ASSESSMENT — PATIENT HEALTH QUESTIONNAIRE - PHQ9
SUM OF ALL RESPONSES TO PHQ QUESTIONS 1-9: 0
SUM OF ALL RESPONSES TO PHQ9 QUESTIONS 1 & 2: 0
6. FEELING BAD ABOUT YOURSELF - OR THAT YOU ARE A FAILURE OR HAVE LET YOURSELF OR YOUR FAMILY DOWN: NOT AT ALL
7. TROUBLE CONCENTRATING ON THINGS, SUCH AS READING THE NEWSPAPER OR WATCHING TELEVISION: NOT AT ALL
SUM OF ALL RESPONSES TO PHQ QUESTIONS 1-9: 0
9. THOUGHTS THAT YOU WOULD BE BETTER OFF DEAD, OR OF HURTING YOURSELF: NOT AT ALL
10. IF YOU CHECKED OFF ANY PROBLEMS, HOW DIFFICULT HAVE THESE PROBLEMS MADE IT FOR YOU TO DO YOUR WORK, TAKE CARE OF THINGS AT HOME, OR GET ALONG WITH OTHER PEOPLE: NOT DIFFICULT AT ALL
8. MOVING OR SPEAKING SO SLOWLY THAT OTHER PEOPLE COULD HAVE NOTICED. OR THE OPPOSITE, BEING SO FIGETY OR RESTLESS THAT YOU HAVE BEEN MOVING AROUND A LOT MORE THAN USUAL: NOT AT ALL
SUM OF ALL RESPONSES TO PHQ QUESTIONS 1-9: 0
3. TROUBLE FALLING OR STAYING ASLEEP: NOT AT ALL
1. LITTLE INTEREST OR PLEASURE IN DOING THINGS: NOT AT ALL
4. FEELING TIRED OR HAVING LITTLE ENERGY: NOT AT ALL
2. FEELING DOWN, DEPRESSED OR HOPELESS: NOT AT ALL
SUM OF ALL RESPONSES TO PHQ QUESTIONS 1-9: 0
5. POOR APPETITE OR OVEREATING: NOT AT ALL

## 2025-01-20 ASSESSMENT — ENCOUNTER SYMPTOMS
BACK PAIN: 1
SHORTNESS OF BREATH: 1
COUGH: 1

## 2025-01-20 NOTE — PROGRESS NOTES
Review of Systems:  Review of Systems   Constitutional:  Positive for activity change, appetite change, fatigue and unexpected weight change.   Respiratory:  Positive for cough and shortness of breath.    Musculoskeletal:  Positive for arthralgias and back pain.   All other systems reviewed and are negative.    Vitals:  /60   Ht 1.676 m (5' 6\")   Wt 51.5 kg (113 lb 9.6 oz)   BMI 18.34 kg/m²     Physical Exam:  Physical Exam  Vitals reviewed.   Constitutional:       Appearance: Normal appearance. She is ill-appearing.      Comments: Thin appearance   HENT:      Head: Normocephalic and atraumatic.   Eyes:      Extraocular Movements: Extraocular movements intact.      Pupils: Pupils are equal, round, and reactive to light.   Cardiovascular:      Rate and Rhythm: Normal rate and regular rhythm.      Heart sounds: Normal heart sounds.   Pulmonary:      Effort: Pulmonary effort is normal.      Comments: Diminished breath sounds  On oxygen therapy  Musculoskeletal:         General: Normal range of motion.      Cervical back: Normal range of motion and neck supple.   Skin:     General: Skin is warm and dry.   Neurological:      Mental Status: She is alert and oriented to person, place, and time.      Motor: Weakness present.      Gait: Gait abnormal.   Psychiatric:         Mood and Affect: Mood normal.         Behavior: Behavior normal.         Thought Content: Thought content normal.         Judgment: Judgment normal.        Assessment/Plan:   Lisa was seen today for follow-up.    Diagnoses and all orders for this visit:    Stage 4 very severe COPD by GOLD classification (HCC)  -     CBC with Auto Differential; Future  -     Comprehensive Metabolic Panel; Future    Essential hypertension  -     CBC with Auto Differential; Future  -     Comprehensive Metabolic Panel; Future    Mixed hyperlipidemia  -     Lipid Panel; Future    Weight loss    Type 2 diabetes mellitus without complication, without long-term

## 2025-01-27 RX ORDER — ATORVASTATIN CALCIUM 40 MG/1
TABLET, FILM COATED ORAL
Qty: 90 TABLET | Refills: 2 | Status: SHIPPED | OUTPATIENT
Start: 2025-01-27

## 2025-02-23 ENCOUNTER — PATIENT MESSAGE (OUTPATIENT)
Age: 65
End: 2025-02-23

## 2025-02-24 RX ORDER — PREDNISONE 20 MG/1
TABLET ORAL
Qty: 15 TABLET | Refills: 0 | Status: SHIPPED | OUTPATIENT
Start: 2025-02-24

## 2025-03-17 DIAGNOSIS — K21.9 GASTROESOPHAGEAL REFLUX DISEASE, UNSPECIFIED WHETHER ESOPHAGITIS PRESENT: Primary | ICD-10-CM

## 2025-03-17 RX ORDER — FAMOTIDINE 40 MG/1
40 TABLET, FILM COATED ORAL NIGHTLY PRN
Qty: 30 TABLET | Refills: 4 | Status: SHIPPED | OUTPATIENT
Start: 2025-03-17

## 2025-03-17 NOTE — TELEPHONE ENCOUNTER
Pt called for a refill.  Pt states her daughter took her medication that she had left and now she needs a refill.     Requested Prescriptions     Pending Prescriptions Disp Refills    famotidine (PEPCID) 40 MG tablet 30 tablet 4     Sig: Take 1 tablet by mouth nightly as needed (GERD)

## 2025-03-19 ENCOUNTER — PATIENT MESSAGE (OUTPATIENT)
Age: 65
End: 2025-03-19

## 2025-03-19 ENCOUNTER — APPOINTMENT (OUTPATIENT)
Dept: GENERAL RADIOLOGY | Age: 65
DRG: 190 | End: 2025-03-19
Payer: MEDICARE

## 2025-03-19 ENCOUNTER — HOSPITAL ENCOUNTER (INPATIENT)
Age: 65
LOS: 2 days | Discharge: HOME | DRG: 190 | End: 2025-03-21
Attending: STUDENT IN AN ORGANIZED HEALTH CARE EDUCATION/TRAINING PROGRAM
Payer: MEDICARE

## 2025-03-19 ENCOUNTER — HOSPITAL ENCOUNTER (EMERGENCY)
Age: 65
Discharge: ANOTHER HOSPITAL | DRG: 190 | End: 2025-03-19
Attending: EMERGENCY MEDICINE
Payer: MEDICARE

## 2025-03-19 VITALS
RESPIRATION RATE: 31 BRPM | SYSTOLIC BLOOD PRESSURE: 128 MMHG | TEMPERATURE: 98.6 F | OXYGEN SATURATION: 93 % | BODY MASS INDEX: 18 KG/M2 | DIASTOLIC BLOOD PRESSURE: 66 MMHG | HEIGHT: 66 IN | WEIGHT: 112 LBS | HEART RATE: 99 BPM

## 2025-03-19 DIAGNOSIS — I10 ESSENTIAL HYPERTENSION: ICD-10-CM

## 2025-03-19 DIAGNOSIS — J18.9 PNEUMONIA OF RIGHT LOWER LOBE DUE TO INFECTIOUS ORGANISM: ICD-10-CM

## 2025-03-19 DIAGNOSIS — J44.1 COPD EXACERBATION (HCC): Primary | ICD-10-CM

## 2025-03-19 DIAGNOSIS — J96.01 ACUTE RESPIRATORY FAILURE WITH HYPOXIA: ICD-10-CM

## 2025-03-19 PROBLEM — J44.9 PULMONARY CACHEXIA DUE TO COPD (HCC): Status: ACTIVE | Noted: 2025-03-19

## 2025-03-19 PROBLEM — R64 PULMONARY CACHEXIA DUE TO COPD (HCC): Status: ACTIVE | Noted: 2025-03-19

## 2025-03-19 PROBLEM — Z91.89 AT HIGH RISK FOR MALNUTRITION: Status: ACTIVE | Noted: 2025-03-19

## 2025-03-19 LAB
ALBUMIN SERPL-MCNC: 4.2 G/DL (ref 3.2–4.6)
ALBUMIN/GLOB SERPL: 1.1 (ref 1–1.9)
ALP SERPL-CCNC: 123 U/L (ref 35–104)
ALT SERPL-CCNC: 10 U/L (ref 12–65)
ANION GAP SERPL CALC-SCNC: 14 MMOL/L (ref 7–16)
AST SERPL-CCNC: 20 U/L (ref 15–37)
BASOPHILS # BLD: 0.05 K/UL (ref 0–0.2)
BASOPHILS NFR BLD: 0.4 % (ref 0–2)
BILIRUB SERPL-MCNC: 0.6 MG/DL (ref 0–1.2)
BUN SERPL-MCNC: 11 MG/DL (ref 8–23)
CALCIUM SERPL-MCNC: 9.8 MG/DL (ref 8.8–10.2)
CHLORIDE SERPL-SCNC: 100 MMOL/L (ref 98–107)
CO2 SERPL-SCNC: 25 MMOL/L (ref 20–29)
CREAT SERPL-MCNC: 0.55 MG/DL (ref 0.8–1.3)
DIFFERENTIAL METHOD BLD: ABNORMAL
EKG ATRIAL RATE: 112 BPM
EKG DIAGNOSIS: NORMAL
EKG P AXIS: 84 DEGREES
EKG P-R INTERVAL: 185 MS
EKG Q-T INTERVAL: 342 MS
EKG QRS DURATION: 78 MS
EKG QTC CALCULATION (BAZETT): 467 MS
EKG R AXIS: 68 DEGREES
EKG T AXIS: 83 DEGREES
EKG VENTRICULAR RATE: 112 BPM
EOSINOPHIL # BLD: 0.14 K/UL (ref 0–0.8)
EOSINOPHIL NFR BLD: 1.2 % (ref 0.5–7.8)
ERYTHROCYTE [DISTWIDTH] IN BLOOD BY AUTOMATED COUNT: 15.2 % (ref 11.9–14.6)
GLOBULIN SER CALC-MCNC: 4 G/DL (ref 2.3–3.5)
GLUCOSE BLD STRIP.AUTO-MCNC: 248 MG/DL (ref 65–100)
GLUCOSE BLD STRIP.AUTO-MCNC: 268 MG/DL (ref 65–100)
GLUCOSE SERPL-MCNC: 134 MG/DL (ref 65–100)
HCT VFR BLD AUTO: 46.4 % (ref 35.8–46.3)
HGB BLD-MCNC: 15.2 G/DL (ref 11.7–15.4)
IMM GRANULOCYTES # BLD AUTO: 0.04 K/UL (ref 0–0.5)
IMM GRANULOCYTES NFR BLD AUTO: 0.3 % (ref 0–5)
LACTATE SERPL-SCNC: 1.8 MMOL/L (ref 0.5–2)
LYMPHOCYTES # BLD: 0.89 K/UL (ref 0.5–4.6)
LYMPHOCYTES NFR BLD: 7.4 % (ref 13–44)
MCH RBC QN AUTO: 30.3 PG (ref 26.1–32.9)
MCHC RBC AUTO-ENTMCNC: 32.8 G/DL (ref 31.4–35)
MCV RBC AUTO: 92.4 FL (ref 82–102)
MONOCYTES # BLD: 0.82 K/UL (ref 0.1–1.3)
MONOCYTES NFR BLD: 6.9 % (ref 4–12)
NEUTS SEG # BLD: 10.02 K/UL (ref 1.7–8.2)
NEUTS SEG NFR BLD: 83.8 % (ref 43–78)
NRBC # BLD: 0 K/UL (ref 0–0.2)
NT PRO BNP: 170 PG/ML (ref 0–450)
PLATELET # BLD AUTO: 248 K/UL (ref 150–450)
PMV BLD AUTO: 8.3 FL (ref 9.4–12.3)
POTASSIUM SERPL-SCNC: 3.7 MMOL/L (ref 3.5–5.1)
PROCALCITONIN SERPL-MCNC: 0.03 NG/ML (ref 0–0.49)
PROT SERPL-MCNC: 8.2 G/DL (ref 6.3–8.2)
RBC # BLD AUTO: 5.02 M/UL (ref 4.05–5.2)
SERVICE CMNT-IMP: ABNORMAL
SERVICE CMNT-IMP: ABNORMAL
SODIUM SERPL-SCNC: 139 MMOL/L (ref 133–143)
WBC # BLD AUTO: 12 K/UL (ref 4.3–11.1)

## 2025-03-19 PROCEDURE — 2500000003 HC RX 250 WO HCPCS

## 2025-03-19 PROCEDURE — 96374 THER/PROPH/DIAG INJ IV PUSH: CPT

## 2025-03-19 PROCEDURE — 93010 ELECTROCARDIOGRAM REPORT: CPT | Performed by: INTERNAL MEDICINE

## 2025-03-19 PROCEDURE — 1100000000 HC RM PRIVATE

## 2025-03-19 PROCEDURE — 82962 GLUCOSE BLOOD TEST: CPT

## 2025-03-19 PROCEDURE — 94640 AIRWAY INHALATION TREATMENT: CPT

## 2025-03-19 PROCEDURE — 6360000002 HC RX W HCPCS: Performed by: EMERGENCY MEDICINE

## 2025-03-19 PROCEDURE — 94761 N-INVAS EAR/PLS OXIMETRY MLT: CPT

## 2025-03-19 PROCEDURE — 96375 TX/PRO/DX INJ NEW DRUG ADDON: CPT

## 2025-03-19 PROCEDURE — 84145 PROCALCITONIN (PCT): CPT

## 2025-03-19 PROCEDURE — 71045 X-RAY EXAM CHEST 1 VIEW: CPT

## 2025-03-19 PROCEDURE — 80053 COMPREHEN METABOLIC PANEL: CPT

## 2025-03-19 PROCEDURE — 87040 BLOOD CULTURE FOR BACTERIA: CPT

## 2025-03-19 PROCEDURE — 6360000002 HC RX W HCPCS

## 2025-03-19 PROCEDURE — 93005 ELECTROCARDIOGRAM TRACING: CPT | Performed by: EMERGENCY MEDICINE

## 2025-03-19 PROCEDURE — 83605 ASSAY OF LACTIC ACID: CPT

## 2025-03-19 PROCEDURE — 2580000003 HC RX 258: Performed by: EMERGENCY MEDICINE

## 2025-03-19 PROCEDURE — 6370000000 HC RX 637 (ALT 250 FOR IP): Performed by: EMERGENCY MEDICINE

## 2025-03-19 PROCEDURE — 6370000000 HC RX 637 (ALT 250 FOR IP)

## 2025-03-19 PROCEDURE — 2500000003 HC RX 250 WO HCPCS: Performed by: EMERGENCY MEDICINE

## 2025-03-19 PROCEDURE — 85025 COMPLETE CBC W/AUTO DIFF WBC: CPT

## 2025-03-19 PROCEDURE — 83880 ASSAY OF NATRIURETIC PEPTIDE: CPT

## 2025-03-19 PROCEDURE — 2700000000 HC OXYGEN THERAPY PER DAY

## 2025-03-19 PROCEDURE — 99285 EMERGENCY DEPT VISIT HI MDM: CPT

## 2025-03-19 PROCEDURE — 96365 THER/PROPH/DIAG IV INF INIT: CPT

## 2025-03-19 RX ORDER — AZITHROMYCIN 250 MG/1
500 TABLET, FILM COATED ORAL DAILY
Status: DISCONTINUED | OUTPATIENT
Start: 2025-03-20 | End: 2025-03-21 | Stop reason: HOSPADM

## 2025-03-19 RX ORDER — CLOPIDOGREL BISULFATE 75 MG/1
75 TABLET ORAL ONCE
Status: COMPLETED | OUTPATIENT
Start: 2025-03-19 | End: 2025-03-19

## 2025-03-19 RX ORDER — ALBUTEROL SULFATE 5 MG/ML
5 SOLUTION RESPIRATORY (INHALATION)
Status: COMPLETED | OUTPATIENT
Start: 2025-03-19 | End: 2025-03-19

## 2025-03-19 RX ORDER — IBUPROFEN 600 MG/1
1 TABLET ORAL PRN
Status: DISCONTINUED | OUTPATIENT
Start: 2025-03-19 | End: 2025-03-21 | Stop reason: HOSPADM

## 2025-03-19 RX ORDER — NICOTINE 21 MG/24HR
1 PATCH, TRANSDERMAL 24 HOURS TRANSDERMAL DAILY
Status: DISCONTINUED | OUTPATIENT
Start: 2025-03-19 | End: 2025-03-21 | Stop reason: HOSPADM

## 2025-03-19 RX ORDER — INSULIN LISPRO 100 [IU]/ML
0-4 INJECTION, SOLUTION INTRAVENOUS; SUBCUTANEOUS
Status: DISCONTINUED | OUTPATIENT
Start: 2025-03-19 | End: 2025-03-21 | Stop reason: HOSPADM

## 2025-03-19 RX ORDER — ASPIRIN 81 MG/1
81 TABLET ORAL DAILY
Status: DISCONTINUED | OUTPATIENT
Start: 2025-03-19 | End: 2025-03-21 | Stop reason: HOSPADM

## 2025-03-19 RX ORDER — ACETAMINOPHEN 650 MG/1
650 SUPPOSITORY RECTAL EVERY 6 HOURS PRN
Status: DISCONTINUED | OUTPATIENT
Start: 2025-03-19 | End: 2025-03-21 | Stop reason: HOSPADM

## 2025-03-19 RX ORDER — ACETAMINOPHEN 325 MG/1
650 TABLET ORAL EVERY 6 HOURS PRN
Status: DISCONTINUED | OUTPATIENT
Start: 2025-03-19 | End: 2025-03-21 | Stop reason: HOSPADM

## 2025-03-19 RX ORDER — ONDANSETRON 4 MG/1
4 TABLET, ORALLY DISINTEGRATING ORAL EVERY 8 HOURS PRN
Status: DISCONTINUED | OUTPATIENT
Start: 2025-03-19 | End: 2025-03-21 | Stop reason: HOSPADM

## 2025-03-19 RX ORDER — POLYETHYLENE GLYCOL 3350 17 G/17G
17 POWDER, FOR SOLUTION ORAL DAILY PRN
Status: DISCONTINUED | OUTPATIENT
Start: 2025-03-19 | End: 2025-03-21 | Stop reason: HOSPADM

## 2025-03-19 RX ORDER — PREDNISONE 20 MG/1
40 TABLET ORAL DAILY
Status: DISCONTINUED | OUTPATIENT
Start: 2025-03-22 | End: 2025-03-21 | Stop reason: HOSPADM

## 2025-03-19 RX ORDER — FAMOTIDINE 20 MG/1
40 TABLET, FILM COATED ORAL NIGHTLY PRN
Status: DISCONTINUED | OUTPATIENT
Start: 2025-03-19 | End: 2025-03-21 | Stop reason: HOSPADM

## 2025-03-19 RX ORDER — DILTIAZEM HYDROCHLORIDE 180 MG/1
180 CAPSULE, COATED, EXTENDED RELEASE ORAL DAILY
Status: DISCONTINUED | OUTPATIENT
Start: 2025-03-19 | End: 2025-03-21 | Stop reason: HOSPADM

## 2025-03-19 RX ORDER — ALBUTEROL SULFATE 0.83 MG/ML
2.5 SOLUTION RESPIRATORY (INHALATION)
Status: DISCONTINUED | OUTPATIENT
Start: 2025-03-19 | End: 2025-03-21 | Stop reason: HOSPADM

## 2025-03-19 RX ORDER — SODIUM CHLORIDE 0.9 % (FLUSH) 0.9 %
5-40 SYRINGE (ML) INJECTION PRN
Status: DISCONTINUED | OUTPATIENT
Start: 2025-03-19 | End: 2025-03-21 | Stop reason: HOSPADM

## 2025-03-19 RX ORDER — ATORVASTATIN CALCIUM 40 MG/1
40 TABLET, FILM COATED ORAL NIGHTLY
Status: DISCONTINUED | OUTPATIENT
Start: 2025-03-19 | End: 2025-03-21 | Stop reason: HOSPADM

## 2025-03-19 RX ORDER — BENZONATATE 100 MG/1
200 CAPSULE ORAL NIGHTLY PRN
Status: DISCONTINUED | OUTPATIENT
Start: 2025-03-19 | End: 2025-03-20

## 2025-03-19 RX ORDER — SODIUM CHLORIDE 9 MG/ML
INJECTION, SOLUTION INTRAVENOUS PRN
Status: DISCONTINUED | OUTPATIENT
Start: 2025-03-19 | End: 2025-03-21 | Stop reason: HOSPADM

## 2025-03-19 RX ORDER — LORAZEPAM 0.5 MG/1
0.5 TABLET ORAL ONCE
Status: COMPLETED | OUTPATIENT
Start: 2025-03-19 | End: 2025-03-19

## 2025-03-19 RX ORDER — IPRATROPIUM BROMIDE AND ALBUTEROL SULFATE 2.5; .5 MG/3ML; MG/3ML
1 SOLUTION RESPIRATORY (INHALATION)
Status: COMPLETED | OUTPATIENT
Start: 2025-03-19 | End: 2025-03-19

## 2025-03-19 RX ORDER — INSULIN GLARGINE 100 [IU]/ML
5 INJECTION, SOLUTION SUBCUTANEOUS NIGHTLY
Status: DISCONTINUED | OUTPATIENT
Start: 2025-03-19 | End: 2025-03-21 | Stop reason: HOSPADM

## 2025-03-19 RX ORDER — DEXTROSE MONOHYDRATE 100 MG/ML
INJECTION, SOLUTION INTRAVENOUS CONTINUOUS PRN
Status: DISCONTINUED | OUTPATIENT
Start: 2025-03-19 | End: 2025-03-21 | Stop reason: HOSPADM

## 2025-03-19 RX ORDER — CITALOPRAM HYDROBROMIDE 20 MG/1
40 TABLET ORAL DAILY
Status: DISCONTINUED | OUTPATIENT
Start: 2025-03-19 | End: 2025-03-21 | Stop reason: HOSPADM

## 2025-03-19 RX ORDER — IBUPROFEN 600 MG/1
1 TABLET ORAL PRN
Status: DISCONTINUED | OUTPATIENT
Start: 2025-03-19 | End: 2025-03-19 | Stop reason: SDUPTHER

## 2025-03-19 RX ORDER — DILTIAZEM HYDROCHLORIDE 180 MG/1
180 CAPSULE, EXTENDED RELEASE ORAL DAILY
Status: DISCONTINUED | OUTPATIENT
Start: 2025-03-19 | End: 2025-03-19 | Stop reason: SDUPTHER

## 2025-03-19 RX ORDER — DEXTROSE MONOHYDRATE 100 MG/ML
INJECTION, SOLUTION INTRAVENOUS CONTINUOUS PRN
Status: DISCONTINUED | OUTPATIENT
Start: 2025-03-19 | End: 2025-03-19 | Stop reason: SDUPTHER

## 2025-03-19 RX ORDER — ENOXAPARIN SODIUM 100 MG/ML
30 INJECTION SUBCUTANEOUS EVERY 24 HOURS
Status: DISCONTINUED | OUTPATIENT
Start: 2025-03-19 | End: 2025-03-21 | Stop reason: HOSPADM

## 2025-03-19 RX ORDER — ONDANSETRON 2 MG/ML
4 INJECTION INTRAMUSCULAR; INTRAVENOUS EVERY 6 HOURS PRN
Status: DISCONTINUED | OUTPATIENT
Start: 2025-03-19 | End: 2025-03-21 | Stop reason: HOSPADM

## 2025-03-19 RX ORDER — SODIUM CHLORIDE 0.9 % (FLUSH) 0.9 %
5-40 SYRINGE (ML) INJECTION EVERY 12 HOURS SCHEDULED
Status: DISCONTINUED | OUTPATIENT
Start: 2025-03-19 | End: 2025-03-21 | Stop reason: HOSPADM

## 2025-03-19 RX ADMIN — ALBUTEROL SULFATE 5 MG: 2.5 SOLUTION RESPIRATORY (INHALATION) at 11:19

## 2025-03-19 RX ADMIN — LORAZEPAM 0.5 MG: 0.5 TABLET ORAL at 15:51

## 2025-03-19 RX ADMIN — ENOXAPARIN SODIUM 30 MG: 100 INJECTION SUBCUTANEOUS at 18:27

## 2025-03-19 RX ADMIN — INSULIN GLARGINE 5 UNITS: 100 INJECTION, SOLUTION SUBCUTANEOUS at 22:36

## 2025-03-19 RX ADMIN — METHYLPREDNISOLONE SODIUM SUCCINATE 40 MG: 40 INJECTION, POWDER, LYOPHILIZED, FOR SOLUTION INTRAMUSCULAR; INTRAVENOUS at 18:28

## 2025-03-19 RX ADMIN — CEFTRIAXONE 1000 MG: 1 INJECTION, POWDER, FOR SOLUTION INTRAMUSCULAR; INTRAVENOUS at 12:36

## 2025-03-19 RX ADMIN — IPRATROPIUM BROMIDE AND ALBUTEROL SULFATE 1 DOSE: 2.5; .5 SOLUTION RESPIRATORY (INHALATION) at 13:02

## 2025-03-19 RX ADMIN — ALBUTEROL SULFATE 2.5 MG: 2.5 SOLUTION RESPIRATORY (INHALATION) at 20:28

## 2025-03-19 RX ADMIN — AZITHROMYCIN MONOHYDRATE 500 MG: 500 INJECTION, POWDER, LYOPHILIZED, FOR SOLUTION INTRAVENOUS at 12:40

## 2025-03-19 RX ADMIN — CLOPIDOGREL BISULFATE 75 MG: 75 TABLET ORAL at 18:27

## 2025-03-19 RX ADMIN — METHYLPREDNISOLONE SODIUM SUCCINATE 125 MG: 125 INJECTION INTRAMUSCULAR; INTRAVENOUS at 11:19

## 2025-03-19 RX ADMIN — ATORVASTATIN CALCIUM 40 MG: 40 TABLET, FILM COATED ORAL at 22:36

## 2025-03-19 RX ADMIN — INSULIN LISPRO 1 UNITS: 100 INJECTION, SOLUTION INTRAVENOUS; SUBCUTANEOUS at 22:36

## 2025-03-19 ASSESSMENT — ENCOUNTER SYMPTOMS
COLOR CHANGE: 0
BACK PAIN: 0
VOMITING: 0
SWOLLEN GLANDS: 0
EYE REDNESS: 0
PHOTOPHOBIA: 0
VOICE CHANGE: 0
SHORTNESS OF BREATH: 1
SPUTUM PRODUCTION: 1
COUGH: 1

## 2025-03-19 ASSESSMENT — PAIN - FUNCTIONAL ASSESSMENT: PAIN_FUNCTIONAL_ASSESSMENT: 0-10

## 2025-03-19 ASSESSMENT — PAIN SCALES - GENERAL: PAINLEVEL_OUTOF10: 5

## 2025-03-19 NOTE — ED PROVIDER NOTES
Emergency Department Provider Note       PCP: Charlotte Pro MD   Age: 65 y.o.   Sex: female     DISPOSITION Decision To Transfer 03/19/2025 02:10:12 PM    ICD-10-CM    1. COPD exacerbation (HCC)  J44.1       2. Acute respiratory failure with hypoxia (HCC)  J96.01       3. Pneumonia of right lower lobe due to infectious organism  J18.9           Medical Decision Making     Patient tachypneic here without much air movement here.  Will treat here with nebs.  Broad-based workup.  Including sepsis markers.  Will obtain portable chest x-ray    2:10 PM EDT  Chest x-ray shows right lower lobe pneumonia as well as effusion.  White count noted at 12,000.  Lactate and Pro-Yg are reassuring.  Patient given multiple nebulizer treatments here.  Work of breathing has improved.  Currently on 5 L and sats are 92%.    Discussed results of testing with patient and family at bedside.  Plan for transfer admission for further medical management.  She was covered with Rocephin and azithromycin here.     1 or more acute illnesses that pose a threat to life or bodily function.   1 or more chronic illnesses with a severe exacerbation or progression.  Prescription drug management performed.  Chronic medical problems impacting care include COPD.  Shared medical decision making was utilized in creating the patients health plan today.  Diagnosis or care significantly limited by social determinants of health tobacco abuse.  I independently ordered and reviewed each unique test.    I reviewed external records: ED visit note from a different ED.   I reviewed external records: provider visit note from PCP.  I reviewed external records: provider visit note from outside specialist.  I reviewed external records: previous EKG including cardiologist interpretation.    I reviewed external records: previous lab results from outside ED.  I reviewed external records: previous imaging study including radiologist interpretation.   The patients  Pain)   Result Value Ref Range    Ventricular Rate 112 BPM    Atrial Rate 112 BPM    P-R Interval 185 ms    QRS Duration 78 ms    Q-T Interval 342 ms    QTc Calculation (Bazett) 467 ms    P Axis 84 degrees    R Axis 68 degrees    T Axis 83 degrees    Diagnosis       Sinus tachycardia  Right atrial enlargement    Confirmed by MARK DRIVER (), MARTHA NICOLE ([id number]) on 3/19/2025 1:43:41 PM           XR CHEST PORTABLE   Final Result   Right lower lobe infiltrate superimposed on chronic changes with   small right effusion.         Electronically signed by Vance Griffin                   No results for input(s): \"COVID19\" in the last 72 hours.     Voice dictation software was used during the making of this note.  This software is not perfect and grammatical and other typographical errors may be present.  This note has not been completely proofread for errors.     Waldemar Serrano MD  03/19/25 [id number]

## 2025-03-19 NOTE — PROGRESS NOTES
TRANSFER - IN REPORT:    Verbal report received from MATTY Sanon on Lisa Serrano  being received from [facility] for routine progression of patient care      Report consisted of patient's Situation, Background, Assessment and   Recommendations(SBAR).     Information from the following report(s) ED Encounter Summary and ED SBAR was reviewed with the receiving nurse.    Opportunity for questions and clarification was provided.      Report given to MATTY aHy who will assume care for patient upon arrival to unit.

## 2025-03-19 NOTE — PROGRESS NOTES
4 Eyes Skin Assessment     NAME:  Lisa Serrano  YOB: 1960  MEDICAL RECORD NUMBER:  [id number]    The patient is being assessed for  Admission    I agree that at least one RN has performed a thorough Head to Toe Skin Assessment on the patient. ALL assessment sites listed below have been assessed.      Areas assessed by both nurses:    Head, Face, Ears, Shoulders, Back, Chest, Arms, Elbows, Hands, Sacrum. Buttock, Coccyx, Ischium, and Legs. Feet and Heels        Does the Patient have a Wound? No noted wound(s)       Mil Prevention initiated by RN: Yes  Wound Care Orders initiated by RN: No    Pressure Injury (Stage 3,4, Unstageable, DTI, NWPT, and Complex wounds) if present, place Wound referral order by RN under : No    New Ostomies, if present place, Ostomy referral order under : No     Nurse 1 eSignature: Electronically signed by NONA THOMAS RN on 3/19/25 at 6:36 PM EDT    **SHARE this note so that the co-signing nurse can place an eSignature**    Nurse 2 eSignature: Electronically signed by Priscila Villafana RN on 3/19/25 at 6:44 PM EDT

## 2025-03-19 NOTE — ED NOTES
Blood Culture drawn and 2nd staff member assisted (audited) Kassy.  Clinical collect stickers were printed prior to scanning and administering antibiotics.

## 2025-03-19 NOTE — ED TRIAGE NOTES
Pt presents to ED with c/o shortness of breath. Hx COPD, pt states she is suppose to wear oxygen but she cannot tolerate it due to nasal irritation. Initial 02 sat 74 on room air. Pt improved to 91 on 4L nc. Chronic cough has been worsening. Intermittent fever. Md at bedside in triage.

## 2025-03-19 NOTE — H&P
Hospitalist History and Physical   Admit Date:  3/19/2025  5:15 PM   Name:  Lisa Serrano   Age:  65 y.o.  Sex:  female  :  1960   MRN:  [id number]   Room:  [facility]/    Presenting/Chief Complaint: No chief complaint on file.     Reason(s) for Admission: COPD exacerbation (HCC) [J44.1]     History of Present Illness:   Lisa Serrano is a 65 y.o. female with medical history of Stage IV COPD, chronic hypoxic respiratory failure with 3 L nasal cannula at home, cachexia secondary to COPD, diabetes, hypoproteinemia, CVA, coronary artery disease hypertension and hyperlipidemia who presented with worsening shortness of breath and cough.  She has had approximately 72 hours prior to admission sputum production that was greenish and brown at times.  On presentation to the emergency department, patient on room air and was saturating 74%, tachypneic and unable to complete sentences with increased work of breathing.      Vital signs otherwise stable.CMP notable for increase in alkaline phosphatase and small increase in ALT as well.  Leukocytosis at 12.0, hemoglobin 15 platelets 248.  Blood cultures x 2 pending CXR with right lower lobe infiltrate superimposed on chronic changes with small right effusion.    Medicine service consulted for admission.      Assessment & Plan:   Stage IV very severe COPD  COPD exacerbation  Acute on chronic hypoxic respiratory failure-resolved  - Albuterol as needed, DuoNebs  - Initiated on Rocephin and azithromycin  - Methylprednisolone IV then p.o. taper    Hyperlipidemia  - Home dose Lipitor    Palpitations  - Home dose diltiazem  - Clopidogrel for anticoagulation    Diabetes  - Lantus 5 units nightly  - Low-dose corrective scale  - Will likely need to uptitrate secondary to increase steroids    Hypertension  - Diltiazem, blood pressure stable    Tobacco use disorder  - Nicotine replacement patch    Depression  -Celexa 40 mg daily    Cachexia from COPD  - Dietary to evaluate for staging  of malnutrition  - Appreciate dietary recommendations for additional nutrition and caloric needs given chronic disease state    Discharge planning  -PT OT eval ordered    PT/OT evals ordered?  Therapy evals ordered  Diet: ADULT DIET; Regular; 4 carb choices (60 gm/meal)  VTE prophylaxis: Already on anticoagulation  Code status: Full Code      Non-peripheral Lines and Tubes (if present):             Hospital Problems:  Principal Problem:    COPD exacerbation ([facility])  Active Problems:    On statin therapy    Dependence on continuous supplemental oxygen    Chronic respiratory failure with hypoxia (HCC)    Palpitations    Diabetes (HCC)    Chronic pain syndrome    Chronic cough    Essential hypertension    Tobacco use    Depression    Stage 4 very severe COPD by GOLD classification ([facility])  Resolved Problems:    * No resolved hospital problems. *        Objective:   Patient Vitals for the past 24 hrs:   Temp Pulse Resp BP SpO2   03/19/25 1719 97.5 °F (36.4 °C) 100 18 (!) 115/58 94 %       Oxygen Therapy  SpO2: 94 %  O2 Device: Nasal cannula    Estimated body mass index is 18.08 kg/m² as calculated from the following:    Height as of an earlier encounter on 3/19/25: 1.676 m (5' 6\").    Weight as of an earlier encounter on 3/19/25: 50.8 kg (112 lb).  No intake or output data in the 24 hours ending 03/19/25 1806      Physical Exam:    Physical Exam  Vitals and nursing note reviewed.   Constitutional:       General: She is not in acute distress.     Appearance: Normal appearance. She is underweight. She is ill-appearing. She is not toxic-appearing.      Interventions: Nasal cannula in place.   HENT:      Head: Normocephalic and atraumatic.      Right Ear: External ear normal.      Left Ear: External ear normal.      Nose: Nose normal.      Mouth/Throat:      Mouth: Mucous membranes are moist.      Pharynx: Oropharynx is clear.   Eyes:      General: No scleral icterus.     Extraocular Movements: Extraocular movements intact.

## 2025-03-19 NOTE — ED NOTES
TRANSFER - OUT REPORT:    Verbal report given to Valarie on Lisa Serrano  being transferred to [facility] Room 815 for routine progression of patient care       Report consisted of patient's Situation, Background, Assessment and   Recommendations(SBAR).     Information from the following report(s) Nurse Handoff Report, ED Encounter Summary, ED SBAR, MAR, Recent Results, and Cardiac Rhythm Sinus Tach  was reviewed with the receiving nurse.    Lines:   Peripheral IV Right Antecubital (Active)   Site Assessment Clean, dry & intact 03/19/25 1115   Line Status Blood return noted;Flushed 03/19/25 1115   Dressing Status New dressing applied 03/19/25 1115       Peripheral IV 03/19/25 Left Cephalic (Active)   Site Assessment Clean, dry & intact 03/19/25 1139   Line Status Brisk blood return;Flushed;Normal saline locked;Specimen collected 03/19/25 1139   Line Care Connections checked and tightened 03/19/25 1139   Phlebitis Assessment No symptoms 03/19/25 1139   Infiltration Assessment 0 03/19/25 1139   Dressing Status Clean, dry & intact 03/19/25 1139   Dressing Type Transparent 03/19/25 1139   Dressing Intervention New 03/19/25 1139        Opportunity for questions and clarification was provided.      Patient transported with:  Monitor and O2 @ 4lpm

## 2025-03-20 PROBLEM — E44.0 MODERATE PROTEIN-CALORIE MALNUTRITION: Status: ACTIVE | Noted: 2025-03-20

## 2025-03-20 LAB
ALBUMIN SERPL-MCNC: 3.1 G/DL (ref 3.2–4.6)
ALBUMIN/GLOB SERPL: 0.7 (ref 1–1.9)
ALP SERPL-CCNC: 89 U/L (ref 35–104)
ALT SERPL-CCNC: 12 U/L (ref 8–45)
ANION GAP SERPL CALC-SCNC: 12 MMOL/L (ref 7–16)
AST SERPL-CCNC: 16 U/L (ref 15–37)
BASOPHILS # BLD: 0.01 K/UL (ref 0–0.2)
BASOPHILS NFR BLD: 0.1 % (ref 0–2)
BILIRUB SERPL-MCNC: 0.3 MG/DL (ref 0–1.2)
BUN SERPL-MCNC: 14 MG/DL (ref 8–23)
CALCIUM SERPL-MCNC: 9.7 MG/DL (ref 8.8–10.2)
CHLORIDE SERPL-SCNC: 102 MMOL/L (ref 98–107)
CO2 SERPL-SCNC: 24 MMOL/L (ref 20–29)
CREAT SERPL-MCNC: 0.5 MG/DL (ref 0.6–1.1)
DIFFERENTIAL METHOD BLD: ABNORMAL
EOSINOPHIL # BLD: 0 K/UL (ref 0–0.8)
EOSINOPHIL NFR BLD: 0 % (ref 0.5–7.8)
ERYTHROCYTE [DISTWIDTH] IN BLOOD BY AUTOMATED COUNT: 15.3 % (ref 11.9–14.6)
EST. AVERAGE GLUCOSE BLD GHB EST-MCNC: 129 MG/DL
GLOBULIN SER CALC-MCNC: 4.5 G/DL (ref 2.3–3.5)
GLUCOSE BLD STRIP.AUTO-MCNC: 135 MG/DL (ref 65–100)
GLUCOSE BLD STRIP.AUTO-MCNC: 151 MG/DL (ref 65–100)
GLUCOSE BLD STRIP.AUTO-MCNC: 194 MG/DL (ref 65–100)
GLUCOSE BLD STRIP.AUTO-MCNC: 209 MG/DL (ref 65–100)
GLUCOSE SERPL-MCNC: 140 MG/DL (ref 70–99)
HBA1C MFR BLD: 6.1 % (ref 0–5.6)
HCT VFR BLD AUTO: 41.6 % (ref 35.8–46.3)
HGB BLD-MCNC: 14.2 G/DL (ref 11.7–15.4)
IMM GRANULOCYTES # BLD AUTO: 0.06 K/UL (ref 0–0.5)
IMM GRANULOCYTES NFR BLD AUTO: 0.5 % (ref 0–5)
LYMPHOCYTES # BLD: 0.48 K/UL (ref 0.5–4.6)
LYMPHOCYTES NFR BLD: 3.9 % (ref 13–44)
MCH RBC QN AUTO: 30.1 PG (ref 26.1–32.9)
MCHC RBC AUTO-ENTMCNC: 34.1 G/DL (ref 31.4–35)
MCV RBC AUTO: 88.3 FL (ref 82–102)
MONOCYTES # BLD: 0.19 K/UL (ref 0.1–1.3)
MONOCYTES NFR BLD: 1.5 % (ref 4–12)
NEUTS SEG # BLD: 11.69 K/UL (ref 1.7–8.2)
NEUTS SEG NFR BLD: 94 % (ref 43–78)
NRBC # BLD: 0 K/UL (ref 0–0.2)
PLATELET # BLD AUTO: 338 K/UL (ref 150–450)
PMV BLD AUTO: 11.1 FL (ref 9.4–12.3)
POTASSIUM SERPL-SCNC: 4.7 MMOL/L (ref 3.5–5.1)
PROT SERPL-MCNC: 7.5 G/DL (ref 6.3–8.2)
RBC # BLD AUTO: 4.71 M/UL (ref 4.05–5.2)
SERVICE CMNT-IMP: ABNORMAL
SODIUM SERPL-SCNC: 138 MMOL/L (ref 136–145)
WBC # BLD AUTO: 12.4 K/UL (ref 4.3–11.1)

## 2025-03-20 PROCEDURE — 82962 GLUCOSE BLOOD TEST: CPT

## 2025-03-20 PROCEDURE — 94761 N-INVAS EAR/PLS OXIMETRY MLT: CPT

## 2025-03-20 PROCEDURE — 97535 SELF CARE MNGMENT TRAINING: CPT

## 2025-03-20 PROCEDURE — 85025 COMPLETE CBC W/AUTO DIFF WBC: CPT

## 2025-03-20 PROCEDURE — 1100000000 HC RM PRIVATE

## 2025-03-20 PROCEDURE — 97161 PT EVAL LOW COMPLEX 20 MIN: CPT

## 2025-03-20 PROCEDURE — 6370000000 HC RX 637 (ALT 250 FOR IP)

## 2025-03-20 PROCEDURE — 6360000002 HC RX W HCPCS

## 2025-03-20 PROCEDURE — 83036 HEMOGLOBIN GLYCOSYLATED A1C: CPT

## 2025-03-20 PROCEDURE — 2700000000 HC OXYGEN THERAPY PER DAY

## 2025-03-20 PROCEDURE — 80053 COMPREHEN METABOLIC PANEL: CPT

## 2025-03-20 PROCEDURE — 94640 AIRWAY INHALATION TREATMENT: CPT

## 2025-03-20 PROCEDURE — 2500000003 HC RX 250 WO HCPCS

## 2025-03-20 PROCEDURE — 97165 OT EVAL LOW COMPLEX 30 MIN: CPT

## 2025-03-20 PROCEDURE — 97530 THERAPEUTIC ACTIVITIES: CPT

## 2025-03-20 PROCEDURE — 36415 COLL VENOUS BLD VENIPUNCTURE: CPT

## 2025-03-20 RX ORDER — GUAIFENESIN 600 MG/1
600 TABLET, EXTENDED RELEASE ORAL 2 TIMES DAILY
Status: DISCONTINUED | OUTPATIENT
Start: 2025-03-20 | End: 2025-03-21 | Stop reason: HOSPADM

## 2025-03-20 RX ORDER — CLOPIDOGREL BISULFATE 75 MG/1
75 TABLET ORAL DAILY
Status: DISCONTINUED | OUTPATIENT
Start: 2025-03-21 | End: 2025-03-21 | Stop reason: HOSPADM

## 2025-03-20 RX ADMIN — ENOXAPARIN SODIUM 30 MG: 100 INJECTION SUBCUTANEOUS at 18:42

## 2025-03-20 RX ADMIN — INSULIN LISPRO 1 UNITS: 100 INJECTION, SOLUTION INTRAVENOUS; SUBCUTANEOUS at 12:05

## 2025-03-20 RX ADMIN — ATORVASTATIN CALCIUM 40 MG: 40 TABLET, FILM COATED ORAL at 21:01

## 2025-03-20 RX ADMIN — GUAIFENESIN 600 MG: 600 TABLET ORAL at 21:01

## 2025-03-20 RX ADMIN — AZITHROMYCIN DIHYDRATE 500 MG: 250 TABLET ORAL at 12:12

## 2025-03-20 RX ADMIN — ALBUTEROL SULFATE 2.5 MG: 2.5 SOLUTION RESPIRATORY (INHALATION) at 20:09

## 2025-03-20 RX ADMIN — SODIUM CHLORIDE, PRESERVATIVE FREE 10 ML: 5 INJECTION INTRAVENOUS at 21:07

## 2025-03-20 RX ADMIN — METHYLPREDNISOLONE SODIUM SUCCINATE 40 MG: 40 INJECTION, POWDER, LYOPHILIZED, FOR SOLUTION INTRAMUSCULAR; INTRAVENOUS at 06:49

## 2025-03-20 RX ADMIN — METHYLPREDNISOLONE SODIUM SUCCINATE 40 MG: 40 INJECTION, POWDER, LYOPHILIZED, FOR SOLUTION INTRAMUSCULAR; INTRAVENOUS at 12:10

## 2025-03-20 RX ADMIN — ASPIRIN 81 MG: 81 TABLET ORAL at 08:44

## 2025-03-20 RX ADMIN — CITALOPRAM HYDROBROMIDE 40 MG: 20 TABLET ORAL at 08:45

## 2025-03-20 RX ADMIN — GUAIFENESIN 600 MG: 600 TABLET ORAL at 15:22

## 2025-03-20 RX ADMIN — ALBUTEROL SULFATE 2.5 MG: 2.5 SOLUTION RESPIRATORY (INHALATION) at 11:33

## 2025-03-20 RX ADMIN — DILTIAZEM HYDROCHLORIDE 180 MG: 180 CAPSULE, EXTENDED RELEASE ORAL at 08:44

## 2025-03-20 RX ADMIN — METHYLPREDNISOLONE SODIUM SUCCINATE 40 MG: 40 INJECTION, POWDER, LYOPHILIZED, FOR SOLUTION INTRAMUSCULAR; INTRAVENOUS at 18:40

## 2025-03-20 RX ADMIN — METHYLPREDNISOLONE SODIUM SUCCINATE 40 MG: 40 INJECTION, POWDER, LYOPHILIZED, FOR SOLUTION INTRAMUSCULAR; INTRAVENOUS at 23:52

## 2025-03-20 RX ADMIN — INSULIN LISPRO 1 UNITS: 100 INJECTION, SOLUTION INTRAVENOUS; SUBCUTANEOUS at 21:01

## 2025-03-20 RX ADMIN — WATER 1000 MG: 1 INJECTION INTRAMUSCULAR; INTRAVENOUS; SUBCUTANEOUS at 12:06

## 2025-03-20 RX ADMIN — INSULIN GLARGINE 5 UNITS: 100 INJECTION, SOLUTION SUBCUTANEOUS at 21:00

## 2025-03-20 RX ADMIN — METHYLPREDNISOLONE SODIUM SUCCINATE 40 MG: 40 INJECTION, POWDER, LYOPHILIZED, FOR SOLUTION INTRAMUSCULAR; INTRAVENOUS at 01:18

## 2025-03-20 RX ADMIN — ACETAMINOPHEN 650 MG: 325 TABLET ORAL at 23:52

## 2025-03-20 RX ADMIN — ALBUTEROL SULFATE 2.5 MG: 2.5 SOLUTION RESPIRATORY (INHALATION) at 08:15

## 2025-03-20 RX ADMIN — SODIUM CHLORIDE, PRESERVATIVE FREE 10 ML: 5 INJECTION INTRAVENOUS at 08:46

## 2025-03-20 ASSESSMENT — PAIN SCALES - GENERAL: PAINLEVEL_OUTOF10: 3

## 2025-03-20 ASSESSMENT — PAIN DESCRIPTION - DESCRIPTORS: DESCRIPTORS: ACHING

## 2025-03-20 ASSESSMENT — PAIN DESCRIPTION - LOCATION: LOCATION: HEAD

## 2025-03-20 NOTE — ACP (ADVANCE CARE PLANNING)
Advance Care Planning   General Advance Care Planning (ACP) Conversation    Date of Conversation: 3/19/2025  Conducted with: Patient with Decision Making Capacity  Other persons present: None    Healthcare Decision Maker:    Primary Decision Maker: Moises Serrano - Spouse - [phone]    Secondary Decision Maker: Mar Tafoya - Child - [phone]    Supplemental (Other) Decision Maker: Mary Rosales - Child - [phone]    Today we documented Decision Maker(s) consistent with Legal Next of Kin hierarchy.  Content/Action Overview:  Patient is ready to consider care preferences-refer to ACP Clinical Specialist  Reviewed DNR/DNI and patient elects Full Code (Attempt Resuscitation)        Length of Voluntary ACP Conversation in minutes:  <16 minutes (Non-Billable)    Christine Bautista RN

## 2025-03-20 NOTE — PROGRESS NOTES
ACUTE PHYSICAL THERAPY GOALS:   (Developed with and agreed upon by patient and/or caregiver.)  LTG:  (1.)Ms. Serrano will move from supine to sit and sit to supine , scoot up and down, and roll side to side in bed with INDEPENDENCE within 7 treatment day(s).    (2.)Ms. Serrano will transfer from bed to chair and chair to bed with MODIFIED INDEPENDENCE using the least restrictive device within 7 treatment day(s).    (3.)Ms. Serrano will ambulate with MODIFIED INDEPENDENCE for 250 feet with the least restrictive device within 7 treatment day(s).  (4.)Ms. Serrano demonstrated INDEPENDENCE with HEP for B LE strengthening within 7 treatment days.    ________________________________________________________________________________________________        PHYSICAL THERAPY Initial Assessment and AM  (Link to Caseload Tracking: PT Visit Days : 1  Acknowledge Orders  Time In/Out  PT Charge Capture  Rehab Caseload Tracker    Lisa Serrano is a 65 y.o. female   PRIMARY DIAGNOSIS: COPD exacerbation (HCC)  COPD exacerbation (HCC) [J44.1]       Reason for Referral: Generalized Muscle Weakness (M62.81)  Difficulty in walking, Not elsewhere classified (R26.2)  Inpatient: Payor: MEDICARE / Plan: MEDICARE PART A AND B / Product Type: *No Product type* /     ASSESSMENT:     REHAB RECOMMENDATIONS:   Recommendation to date pending progress:  Setting:  Home Health Therapy    Equipment:     rollator     ASSESSMENT:  Ms. Serrano arrived to the hospital with c/o SOB and cough.  Pt has a history of: CVA and stage IV COPD.  Ms. Serrano presents to PT with WFL AROM and decreased strength in B LEs.  She reported that she wears 3 L/min supplemental O2 at baseline but was on 4 L/min today upon arrival.  Pt was able to stand today with SBA/RW and fair+ standing balance.  She ambulated short bout with CGA-SBA/RW and decreased rody.  Pt appeared to fatigue quickly with walking and returned to recliner with CGA/RW.  Her post walk SpO2 was recorded at 87% but  Comments:   Bed Mobility    Rolling [] [] [] [] [] [] [] [] [] [] []    Supine to Sit [] [] [] [] [] [] [] [] [] [] []    Scooting [] [] [] [] [] [] [] [] [] [] []    Sit to Supine [] [] [] [] [] [] [] [] [] [] []    Transfers    Sit to Stand [] [] [] [x] [] [] [] [] [] [] []    Bed to Chair [] [] [] [] [] [] [] [] [] [] []    Stand to Sit [] [] [] [x] [] [] [] [] [] [] []     [] [] [] [] [] [] [] [] [] [] []    I=Independent, Mod I=Modified Independent, S=Supervision, SBA=Standby Assistance, CGA=Contact Guard Assistance,   Min=Minimal Assistance, Mod=Moderate Assistance, Max=Maximal Assistance, Total=Total Assistance, NT=Not Tested    GAIT: I Mod I S SBA CGA Min Mod Max Total  NT x2 Comments:   Level of Assistance [] [] [] [x] [x] [] [] [] [] [] []    Distance   40 feet    DME Rolling Walker    Gait Quality Decreased rody , Decreased step length, and Narrow base of support    Weightbearing Status      Stairs      I=Independent, Mod I=Modified Independent, S=Supervision, SBA=Standby Assistance, CGA=Contact Guard Assistance,   Min=Minimal Assistance, Mod=Moderate Assistance, Max=Maximal Assistance, Total=Total Assistance, NT=Not Tested    PLAN:   FREQUENCY AND DURATION: 3 times/week for duration of hospital stay or until stated goals are met, whichever comes first.    THERAPY PROGNOSIS: Fair    PROBLEM LIST:   (Skilled intervention is medically necessary to address:)  Decreased Activity Tolerance  Decreased Balance  Decreased Gait Ability  Decreased Strength INTERVENTIONS PLANNED:   (Benefits and precautions of physical therapy have been discussed with the patient.)  Therapeutic Activity  Therapeutic Exercise/HEP  Neuromuscular Re-education  Gait Training  Education       TREATMENT:   EVALUATION: LOW COMPLEXITY: (Untimed Charge)  The initial evaluation charge encompasses clinical chart review, objective assessment, interpretation of assessment, and skilled monitoring of the patient's response to treatment in order

## 2025-03-20 NOTE — PROGRESS NOTES
Hospitalist Progress Note   Admit Date:  3/19/2025  5:15 PM   Name:  Lisa Serrano   Age:  65 y.o.  Sex:  female  :  1960   MRN:  [id number]   Room:  [facility]/    Presenting/Chief Complaint: No chief complaint on file.     Reason(s) for Admission: COPD exacerbation (HCC) [J44.1]     Hospital Course:   Lisa Serrano is a 65 y.o. female with medical history of Stage IV COPD, chronic hypoxic respiratory failure with 3 L nasal cannula at home, cachexia secondary to COPD, diabetes, hypoproteinemia, CVA, coronary artery disease hypertension and hyperlipidemia who presented with worsening shortness of breath and cough.  She has had approximately 72 hours prior to admission sputum production that was greenish and brown at times.  On presentation to the emergency department, patient on room air and was saturating 74%, tachypneic and unable to complete sentences with increased work of breathing. Vital signs otherwise stable.CMP notable for increase in alkaline phosphatase and small increase in ALT as well.  Leukocytosis at 12.0, hemoglobin 15 platelets 248.  Blood cultures x 2 pending CXR with right lower lobe infiltrate superimposed on chronic changes with small right effusion. Medicine service consulted for admission.       Subjective & 24hr Events:   No overnight events reported. Shortness of breath improved but not at baseline.   Patient is a current smoker and counseled on tobacco cessation. Discussed sending chantix to pharmacy on DC and patient agreeable.        Assessment & Plan:     Stage IV very severe COPD  COPD exacerbation  Acute on chronic hypoxic respiratory failure-resolved  - Albuterol as needed, DuoNebs  - Initiated on Rocephin and azithromycin  - Methylprednisolone IV then p.o. taper  -- Still with significant wheezing on exam, but dyspnea improving. Likely can DC tomorrow.      Hyperlipidemia  - Home dose Lipitor     Palpitations  - Home dose diltiazem  - Clopidogrel for anticoagulation

## 2025-03-20 NOTE — PROGRESS NOTES
ACUTE OCCUPATIONAL THERAPY GOALS:   (Developed with and agreed upon by patient and/or caregiver.)  1. Patient will complete lower body bathing and dressing with MODIFIED INDEPENDENCE and adaptive equipment as needed.     2. Patient will complete toileting with INDEPENDENCE.  3. Patient will complete grooming ADL standing at sink with INDEPENDENCE.  4. Patient will tolerate 25 minutes of OT treatment with 1-2 rest breaks to increase activity tolerance for ADLs.   5. Patient will complete functional transfers with MODIFIED INDEPENDENCE and adaptive equipment as needed.   6. Patient will verbalize 3 energy conservation techniques to utilize during ADL/IADL.      Timeframe: 7 visits     OCCUPATIONAL THERAPY Initial Assessment and Daily Note       OT Visit Days: 1  Acknowledge Orders  Time  OT Charge Capture  Rehab Caseload Tracker      Lisa Serrano is a 65 y.o. female   PRIMARY DIAGNOSIS: COPD exacerbation (HCC)  COPD exacerbation (HCC) [J44.1]       Reason for Referral: Generalized Muscle Weakness (M62.81)  Difficulty in walking, Not elsewhere classified (R26.2)  Other abnormalities of gait and mobility (R26.89)  Inpatient: Payor: MEDICARE / Plan: MEDICARE PART A AND B / Product Type: *No Product type* /     ASSESSMENT:     REHAB RECOMMENDATIONS:   Recommendation to date pending progress:  Setting:  Home Health Therapy    Equipment:    To Be Determined--pt has SPC and wheelchair at home     ASSESSMENT:  Ms. Serrano is a 66 y/o female presents with SOB, cough, found to be in COPD exacerbation. At baseline pt lives with her family, is independent all ADLs and does not use DME for ambulation. Pt reports wearing 3L O2 nasal cannula at night, 2L PRN during the day, currently on 4L O2 NC. Today pt presents with decreased activity tolerance, balance, strength and mobility impacting ADLs. Pt overall CGA no AD for functional transfers and mobility of household distances, noted to hold onto furniture for balance. Pt unable to  training and safety, and energy conservation techniques during ADL/IADLS and patient verbalized understanding and will need reinforcement at next session.     TREATMENT GRID:  N/A    AFTER TREATMENT PRECAUTIONS: Call light within reach, Chair, Needs within reach, and RN notified    INTERDISCIPLINARY COLLABORATION:  RN/ PCT    EDUCATION:  Education Given To: Patient  Education Provided: Role of Therapy;Plan of Care  Education Method: Verbal  Barriers to Learning: None  Education Outcome: Verbalized understanding    TOTAL TREATMENT DURATION AND TIME:  Time In: 0916  Time Out: 0940  Minutes: 24    ENEDELIA Tobar, OT

## 2025-03-20 NOTE — CONSULTS
Nutrition Assessment  Assessment Type: Initial, Positive nutrition screen, Consult  Reason for visit:  Best Practice Alert: Malnutrition Screening Tool  and General Nutrition Management/other reason (Hospitalists)  Malnutrition Screening Tool Score: 2    Nutrition Intervention:   Food and/or Nutrient Delivery:   Meals and Snacks:  Diet: Continue current order  Medical Food Supplements:   Medical food supplement therapy:  Change Magic Cup (frozen oral supplement) 290 calories, 9 grams protein per 4 ounce serving  Coordination of Nutrition Care:  Coordination with health care provider Provider, Dr. Bullock     Malnutrition Assessment:  Academy/A.S.P.E[facility]N Clinical Malnutrition Criteria  Malnutrition Status: Moderate malnutrition  Context: Chronic Illness  Findings of clinical characteristics of malnutrition:   Energy Intake:  No decrease in energy intake  Weight Loss:  Unable to assess (no measured weight this admission)     Body Fat Loss:  Mild body fat loss Orbital, Triceps   Muscle Mass Loss:  Mild muscle mass loss Temples (temporalis), Clavicles (pectoralis & deltoids), Hand (interosseous)  Fluid Accumulation:  Unable to assess     Strength:  Not Performed    Nutrition Assessment:  Food/Nutrition Related History: Patient and family in room provide the following nutrition hx. Reports that for the past two years; patient started losing weight gradually. Endorses that 2 years ago, pt weighed 140# but has gotten down to 112# over this time period. Notes that this weight loss directly correlates with hiatal hernia findings. She has issues with emesis with PO intake and typically eats only 1 meal per day (dinner). May snack at lunch time. Has tried ONS in the vika but does not tolerate them. Family believes that hiatal hernia + COPD has led to inadequate PO intake and weight loss.   Of note: 2/23 barium swallow of small hiatal hernia. 10/2023 EGD with findings of normal esophagus.      Do You Have Any Cultural,

## 2025-03-21 VITALS
TEMPERATURE: 98.6 F | SYSTOLIC BLOOD PRESSURE: 126 MMHG | RESPIRATION RATE: 16 BRPM | BODY MASS INDEX: 18 KG/M2 | OXYGEN SATURATION: 92 % | HEART RATE: 85 BPM | WEIGHT: 112 LBS | HEIGHT: 66 IN | DIASTOLIC BLOOD PRESSURE: 65 MMHG

## 2025-03-21 LAB
ANION GAP SERPL CALC-SCNC: 13 MMOL/L (ref 7–16)
BASOPHILS # BLD: 0.02 K/UL (ref 0–0.2)
BASOPHILS NFR BLD: 0.1 % (ref 0–2)
BUN SERPL-MCNC: 17 MG/DL (ref 8–23)
CALCIUM SERPL-MCNC: 9.4 MG/DL (ref 8.8–10.2)
CHLORIDE SERPL-SCNC: 102 MMOL/L (ref 98–107)
CO2 SERPL-SCNC: 24 MMOL/L (ref 20–29)
CREAT SERPL-MCNC: 0.58 MG/DL (ref 0.6–1.1)
DIFFERENTIAL METHOD BLD: ABNORMAL
EOSINOPHIL # BLD: 0 K/UL (ref 0–0.8)
EOSINOPHIL NFR BLD: 0 % (ref 0.5–7.8)
ERYTHROCYTE [DISTWIDTH] IN BLOOD BY AUTOMATED COUNT: 15.1 % (ref 11.9–14.6)
GLUCOSE BLD STRIP.AUTO-MCNC: 137 MG/DL (ref 65–100)
GLUCOSE SERPL-MCNC: 159 MG/DL (ref 70–99)
HCT VFR BLD AUTO: 42.4 % (ref 35.8–46.3)
HGB BLD-MCNC: 14.3 G/DL (ref 11.7–15.4)
IMM GRANULOCYTES # BLD AUTO: 0.09 K/UL (ref 0–0.5)
IMM GRANULOCYTES NFR BLD AUTO: 0.6 % (ref 0–5)
LYMPHOCYTES # BLD: 0.55 K/UL (ref 0.5–4.6)
LYMPHOCYTES NFR BLD: 3.5 % (ref 13–44)
MCH RBC QN AUTO: 29.9 PG (ref 26.1–32.9)
MCHC RBC AUTO-ENTMCNC: 33.7 G/DL (ref 31.4–35)
MCV RBC AUTO: 88.5 FL (ref 82–102)
MONOCYTES # BLD: 0.35 K/UL (ref 0.1–1.3)
MONOCYTES NFR BLD: 2.2 % (ref 4–12)
NEUTS SEG # BLD: 14.84 K/UL (ref 1.7–8.2)
NEUTS SEG NFR BLD: 93.6 % (ref 43–78)
NRBC # BLD: 0 K/UL (ref 0–0.2)
PLATELET # BLD AUTO: 248 K/UL (ref 150–450)
PMV BLD AUTO: 8.6 FL (ref 9.4–12.3)
POTASSIUM SERPL-SCNC: 4.5 MMOL/L (ref 3.5–5.1)
RBC # BLD AUTO: 4.79 M/UL (ref 4.05–5.2)
SERVICE CMNT-IMP: ABNORMAL
SODIUM SERPL-SCNC: 139 MMOL/L (ref 136–145)
WBC # BLD AUTO: 15.9 K/UL (ref 4.3–11.1)

## 2025-03-21 PROCEDURE — 6370000000 HC RX 637 (ALT 250 FOR IP)

## 2025-03-21 PROCEDURE — 6360000002 HC RX W HCPCS

## 2025-03-21 PROCEDURE — 94761 N-INVAS EAR/PLS OXIMETRY MLT: CPT

## 2025-03-21 PROCEDURE — 2700000000 HC OXYGEN THERAPY PER DAY

## 2025-03-21 PROCEDURE — 82962 GLUCOSE BLOOD TEST: CPT

## 2025-03-21 PROCEDURE — 80048 BASIC METABOLIC PNL TOTAL CA: CPT

## 2025-03-21 PROCEDURE — 85025 COMPLETE CBC W/AUTO DIFF WBC: CPT

## 2025-03-21 PROCEDURE — 2500000003 HC RX 250 WO HCPCS

## 2025-03-21 PROCEDURE — 94640 AIRWAY INHALATION TREATMENT: CPT

## 2025-03-21 PROCEDURE — 36415 COLL VENOUS BLD VENIPUNCTURE: CPT

## 2025-03-21 RX ORDER — VARENICLINE TARTRATE 0.5 MG/1
.5-1 TABLET, FILM COATED ORAL SEE ADMIN INSTRUCTIONS
Qty: 57 TABLET | Refills: 0 | Status: SHIPPED | OUTPATIENT
Start: 2025-03-21

## 2025-03-21 RX ORDER — DILTIAZEM HYDROCHLORIDE 180 MG/1
180 CAPSULE, EXTENDED RELEASE ORAL DAILY
Qty: 90 CAPSULE | Refills: 0 | Status: SHIPPED | OUTPATIENT
Start: 2025-03-21

## 2025-03-21 RX ORDER — NICOTINE 21 MG/24HR
1 PATCH, TRANSDERMAL 24 HOURS TRANSDERMAL DAILY
Qty: 30 PATCH | Refills: 3 | Status: SHIPPED | OUTPATIENT
Start: 2025-03-22

## 2025-03-21 RX ORDER — PREDNISONE 20 MG/1
TABLET ORAL
Qty: 15 TABLET | Refills: 0 | Status: SHIPPED | OUTPATIENT
Start: 2025-03-21 | End: 2025-03-26 | Stop reason: DRUGHIGH

## 2025-03-21 RX ADMIN — CITALOPRAM HYDROBROMIDE 40 MG: 20 TABLET ORAL at 08:13

## 2025-03-21 RX ADMIN — ALBUTEROL SULFATE 2.5 MG: 2.5 SOLUTION RESPIRATORY (INHALATION) at 07:38

## 2025-03-21 RX ADMIN — GUAIFENESIN 600 MG: 600 TABLET ORAL at 08:12

## 2025-03-21 RX ADMIN — CLOPIDOGREL BISULFATE 75 MG: 75 TABLET ORAL at 08:12

## 2025-03-21 RX ADMIN — DILTIAZEM HYDROCHLORIDE 180 MG: 180 CAPSULE, EXTENDED RELEASE ORAL at 08:13

## 2025-03-21 RX ADMIN — ASPIRIN 81 MG: 81 TABLET ORAL at 08:13

## 2025-03-21 RX ADMIN — METHYLPREDNISOLONE SODIUM SUCCINATE 40 MG: 40 INJECTION, POWDER, LYOPHILIZED, FOR SOLUTION INTRAMUSCULAR; INTRAVENOUS at 05:42

## 2025-03-21 RX ADMIN — ALBUTEROL SULFATE 2.5 MG: 2.5 SOLUTION RESPIRATORY (INHALATION) at 11:28

## 2025-03-21 ASSESSMENT — PAIN SCALES - WONG BAKER: WONGBAKER_NUMERICALRESPONSE: NO HURT

## 2025-03-21 ASSESSMENT — PAIN SCALES - GENERAL: PAINLEVEL_OUTOF10: 0

## 2025-03-21 NOTE — NURSE NAVIGATOR
This RN Navigator at bedside, introduced to patient.  Patient informed that a member of the [facility] team will be following them at least 30 days post discharge to act as a resource for any needs that may arise in relation to their COPD diagnosis and treatment.       Patient states understanding of ?D disease process and COPD action plan discussed.        Patient verbalized understanding of importance of COPD  zone tool, handwashing, PCP follow up within 7 days of hospital discharge.     COPD educational booklet given to patient. Patient informed that a member of the [facility] team will be contacting them following hospital discharge. Contact information verified by patient.           *This patient is a Population Health participant. This RN Navigator will not be following progress or contacting patient after hospital discharge. Transitions of Care team will follow for any needs.*

## 2025-03-21 NOTE — DISCHARGE SUMMARY
Hospitalist Discharge Summary   Admit Date:  3/19/2025  5:15 PM   DC Note date: 3/21/2025  Name:  Lisa Serrano   Age:  65 y.o.  Sex:  female  :  1960   MRN:  [id number]   Room:  [facility]  PCP:  Charlotte Pro MD    Presenting Complaint: No chief complaint on file.     Initial Admission Diagnosis: COPD exacerbation (HCC) [J44.1]     Problem List for this Hospitalization (present on admission):    Principal Problem:    COPD exacerbation (HCC)  Active Problems:    On statin therapy    Dependence on continuous supplemental oxygen    Chronic respiratory failure with hypoxia (HCC)    Palpitations    Diabetes (HCC)    Chronic pain syndrome    Chronic cough    Essential hypertension    Tobacco use    Depression    Stage 4 very severe COPD by GOLD classification (HCC)    At high risk for malnutrition    Pulmonary cachexia due to COPD (HCC)    Moderate protein-calorie malnutrition  Resolved Problems:    * No resolved hospital problems. *      Hospital Course:  Lisa Serrano is a 65 y.o. female with medical history of Stage IV COPD, chronic hypoxic respiratory failure with 3 L nasal cannula at home, cachexia secondary to COPD, diabetes, hypoproteinemia, CVA, coronary artery disease hypertension and hyperlipidemia who presented with worsening shortness of breath and cough. She has had approximately 72 hours prior to admission sputum production that was greenish and brown at times. On presentation to the emergency department, patient on room air and was saturating 74%, tachypneic and unable to complete sentences with increased work of breathing. Vital signs otherwise stable.CMP notable for increase in alkaline phosphatase and small increase in ALT as well. Leukocytosis at 12.0, hemoglobin 15 platelets 248. Blood cultures x 2 pending CXR with right lower lobe infiltrate superimposed on chronic changes with small right effusion. Medicine service consulted for admission.     Stage IV very severe COPD  COPD  1032  Gross per 24 hour   Intake 240 ml   Output --   Net 240 ml         Physical Exam:  General:          Thin female. No distress.  Head:               Normocephalic, atraumatic  Eyes:               Sclerae appear normal.  Pupils equally round.  ENT:                Nares appear normal.  Moist oral mucosa  Neck:               No restricted ROM.  Trachea midline   CV:                  RRR.  No m/r/g.  No jugular venous distension.  Lungs:             CTAB. No respiratory distress. Symmetric expansion.  Abdomen:        Soft, nontender, nondistended.  Extremities:     No cyanosis or clubbing.  No edema  Skin:                No rashes.  Normal coloration.   Warm and dry.    Neuro:             CN II-XII grossly intact.    Psych:             Normal mood and affect.        Time spent in patient discharge and coordination 39 minutes.      Signed:  Navid Bullock MD    Part of this note may have been written by using a voice dictation software.  The note has been proof read but may still contain some grammatical/other typographical errors.

## 2025-03-21 NOTE — CARE COORDINATION
MSN, CM:  patient to be discharged home today with no services ordered.  PT/OT recommended HH but patient declined and states she can do those exercises herself and family is always around.  Patient informed PCP could also order if needed at a later time.  Patient is current with home oxygen and has been instructed to wear as directed.  Family to bring tank upon discharge.         03/21/25 1053   Service Assessment   Patient Orientation Alert and Oriented   Cognition Alert   Services At/After Discharge   Services At/After Discharge None   Mode of Transport at Discharge Other (see comment)  (family)   Confirm Follow Up Transport Self   Condition of Participation: Discharge Planning   The Patient and/Or Patient Representative agree with the Discharge Plan? Yes       
  Bathroom Toilet Standard   Bathroom Equipment Shower chair   Bathroom Accessibility Accessible   Home Equipment Cane;Wheelchair - Manual;Oxygen   Receives Help From Family   Prior Level of Assist for ADLs Independent   Prior Level of Assist for Homemaking Independent   Homemaking Responsibilities Yes   Ambulation Assistance Independent   Prior Level of Assist for Transfers Independent   Active  Yes   Mode of Transportation Car   Occupation On disability   Discharge Planning   Type of Residence House   Living Arrangements Spouse/Significant Other;Children;Family Members  (, daughter, and grandchild)   Current Services Prior To Admission Durable Medical Equipment;Oxygen Therapy  ([facility] - oxygen)   Current DME Prior to Arrival Cane;Shower Chair;Wheelchair;Oxygen Therapy (Comment)   DME Ordered? No   Potential Assistance Purchasing Medications No   Type of Home Care Services None   Patient expects to be discharged to: House   One/Two Story Residence One story   History of falls? 0

## 2025-03-21 NOTE — PLAN OF CARE
Problem: Respiratory - Adult  Goal: Achieves optimal ventilation and oxygenation  Outcome: Progressing  Flowsheets (Taken 3/21/2025 4782)  Achieves optimal ventilation and oxygenation:   Assess for changes in respiratory status   Position to facilitate oxygenation and minimize respiratory effort   Respiratory therapy support as indicated   Assess for changes in mentation and behavior   Oxygen supplementation based on oxygen saturation or arterial blood gases   Encourage broncho-pulmonary hygiene including cough, deep breathe, incentive spirometry   Assess and instruct to report shortness of breath or any respiratory difficulty     
Every 4-6 hours:  If on nasal continuous positive airway pressure, respiratory therapy assess nares and determine need for appliance change or resting period  3/20/2025 2309 by Kathy Gusman, RN  Outcome: Progressing  3/20/2025 1345 by Devante Ortega, RN  Outcome: Progressing

## 2025-03-21 NOTE — PROGRESS NOTES
Hourly rounding completed throughout shift. All pt needs addressed. Pt with no complaints at this time and resting in bed. Bed wheels locked, in lowest position, and call light in reach. Will give report to oncoming RN.

## 2025-03-22 ENCOUNTER — RESULTS FOLLOW-UP (OUTPATIENT)
Dept: EMERGENCY DEPT | Age: 65
End: 2025-03-22

## 2025-03-26 ENCOUNTER — OFFICE VISIT (OUTPATIENT)
Dept: INTERNAL MEDICINE CLINIC | Facility: CLINIC | Age: 65
End: 2025-03-26
Payer: MEDICARE

## 2025-03-26 ENCOUNTER — HOSPITAL ENCOUNTER (OUTPATIENT)
Dept: GENERAL RADIOLOGY | Age: 65
Discharge: HOME | End: 2025-03-28
Payer: MEDICARE

## 2025-03-26 VITALS
OXYGEN SATURATION: 100 % | WEIGHT: 105.6 LBS | SYSTOLIC BLOOD PRESSURE: 116 MMHG | TEMPERATURE: 98.1 F | BODY MASS INDEX: 16.97 KG/M2 | DIASTOLIC BLOOD PRESSURE: 72 MMHG | HEART RATE: 72 BPM | HEIGHT: 66 IN

## 2025-03-26 DIAGNOSIS — K59.00 CONSTIPATION, UNSPECIFIED CONSTIPATION TYPE: ICD-10-CM

## 2025-03-26 DIAGNOSIS — Z99.81 DEPENDENCE ON CONTINUOUS SUPPLEMENTAL OXYGEN: ICD-10-CM

## 2025-03-26 DIAGNOSIS — F17.219 CIGARETTE NICOTINE DEPENDENCE WITH NICOTINE-INDUCED DISORDER: ICD-10-CM

## 2025-03-26 DIAGNOSIS — Z09 HOSPITAL DISCHARGE FOLLOW-UP: ICD-10-CM

## 2025-03-26 DIAGNOSIS — Z00.00 INITIAL MEDICARE ANNUAL WELLNESS VISIT: ICD-10-CM

## 2025-03-26 DIAGNOSIS — J44.9 CHRONIC OBSTRUCTIVE PULMONARY DISEASE, UNSPECIFIED COPD TYPE (HCC): ICD-10-CM

## 2025-03-26 DIAGNOSIS — J18.9 COMMUNITY ACQUIRED PNEUMONIA OF RIGHT LOWER LOBE OF LUNG: ICD-10-CM

## 2025-03-26 DIAGNOSIS — J18.9 COMMUNITY ACQUIRED PNEUMONIA OF RIGHT LOWER LOBE OF LUNG: Primary | ICD-10-CM

## 2025-03-26 PROCEDURE — G0438 PPPS, INITIAL VISIT: HCPCS | Performed by: NURSE PRACTITIONER

## 2025-03-26 PROCEDURE — 1090F PRES/ABSN URINE INCON ASSESS: CPT | Performed by: NURSE PRACTITIONER

## 2025-03-26 PROCEDURE — 3074F SYST BP LT 130 MM HG: CPT | Performed by: NURSE PRACTITIONER

## 2025-03-26 PROCEDURE — 3023F SPIROM DOC REV: CPT | Performed by: NURSE PRACTITIONER

## 2025-03-26 PROCEDURE — 1123F ACP DISCUSS/DSCN MKR DOCD: CPT | Performed by: NURSE PRACTITIONER

## 2025-03-26 PROCEDURE — 99406 BEHAV CHNG SMOKING 3-10 MIN: CPT | Performed by: NURSE PRACTITIONER

## 2025-03-26 PROCEDURE — G8399 PT W/DXA RESULTS DOCUMENT: HCPCS | Performed by: NURSE PRACTITIONER

## 2025-03-26 PROCEDURE — 99214 OFFICE O/P EST MOD 30 MIN: CPT | Performed by: NURSE PRACTITIONER

## 2025-03-26 PROCEDURE — G8419 CALC BMI OUT NRM PARAM NOF/U: HCPCS | Performed by: NURSE PRACTITIONER

## 2025-03-26 PROCEDURE — 3078F DIAST BP <80 MM HG: CPT | Performed by: NURSE PRACTITIONER

## 2025-03-26 PROCEDURE — 71046 X-RAY EXAM CHEST 2 VIEWS: CPT

## 2025-03-26 PROCEDURE — G8427 DOCREV CUR MEDS BY ELIG CLIN: HCPCS | Performed by: NURSE PRACTITIONER

## 2025-03-26 PROCEDURE — 4004F PT TOBACCO SCREEN RCVD TLK: CPT | Performed by: NURSE PRACTITIONER

## 2025-03-26 PROCEDURE — 3017F COLORECTAL CA SCREEN DOC REV: CPT | Performed by: NURSE PRACTITIONER

## 2025-03-26 PROCEDURE — 1111F DSCHRG MED/CURRENT MED MERGE: CPT | Performed by: NURSE PRACTITIONER

## 2025-03-26 RX ORDER — PREDNISONE 20 MG/1
TABLET ORAL
Qty: 13 TABLET | Refills: 0 | Status: SHIPPED | OUTPATIENT
Start: 2025-03-26 | End: 2025-04-03

## 2025-03-26 ASSESSMENT — PATIENT HEALTH QUESTIONNAIRE - PHQ9
SUM OF ALL RESPONSES TO PHQ QUESTIONS 1-9: 10
10. IF YOU CHECKED OFF ANY PROBLEMS, HOW DIFFICULT HAVE THESE PROBLEMS MADE IT FOR YOU TO DO YOUR WORK, TAKE CARE OF THINGS AT HOME, OR GET ALONG WITH OTHER PEOPLE: NOT DIFFICULT AT ALL
7. TROUBLE CONCENTRATING ON THINGS, SUCH AS READING THE NEWSPAPER OR WATCHING TELEVISION: NOT AT ALL
SUM OF ALL RESPONSES TO PHQ QUESTIONS 1-9: 10
SUM OF ALL RESPONSES TO PHQ QUESTIONS 1-9: 10
6. FEELING BAD ABOUT YOURSELF - OR THAT YOU ARE A FAILURE OR HAVE LET YOURSELF OR YOUR FAMILY DOWN: NOT AT ALL
1. LITTLE INTEREST OR PLEASURE IN DOING THINGS: NOT AT ALL
SUM OF ALL RESPONSES TO PHQ QUESTIONS 1-9: 10
3. TROUBLE FALLING OR STAYING ASLEEP: NEARLY EVERY DAY
5. POOR APPETITE OR OVEREATING: NEARLY EVERY DAY
2. FEELING DOWN, DEPRESSED OR HOPELESS: NOT AT ALL
8. MOVING OR SPEAKING SO SLOWLY THAT OTHER PEOPLE COULD HAVE NOTICED. OR THE OPPOSITE, BEING SO FIGETY OR RESTLESS THAT YOU HAVE BEEN MOVING AROUND A LOT MORE THAN USUAL: SEVERAL DAYS
9. THOUGHTS THAT YOU WOULD BE BETTER OFF DEAD, OR OF HURTING YOURSELF: NOT AT ALL
4. FEELING TIRED OR HAVING LITTLE ENERGY: NEARLY EVERY DAY

## 2025-03-26 ASSESSMENT — ENCOUNTER SYMPTOMS
CONSTIPATION: 1
SHORTNESS OF BREATH: 1
VOMITING: 0
COUGH: 1
WHEEZING: 1
NAUSEA: 0
CHEST TIGHTNESS: 0

## 2025-03-26 ASSESSMENT — LIFESTYLE VARIABLES
HOW MANY STANDARD DRINKS CONTAINING ALCOHOL DO YOU HAVE ON A TYPICAL DAY: 1 OR 2
HOW OFTEN DO YOU HAVE A DRINK CONTAINING ALCOHOL: MONTHLY OR LESS

## 2025-03-26 NOTE — PATIENT INSTRUCTIONS
[employer].   Care instructions adapted under license by [employer]. If you have questions about a medical condition or this instruction, always ask your healthcare professional. [facility] [employer], disclaims any warranty or liability for your use of this information.         Learning About Being Active as an Older Adult  Why is being active important as you get older?     Being active is one of the best things you can do for your health. And it's never too late to start. Being active--or getting active, if you aren't already--has definite benefits. It can:  Give you more energy,  Keep your mind sharp.  Improve balance to reduce your risk of falls.  Help you manage chronic illness with fewer medicines.  No matter how old you are, how fit you are, or what health problems you have, there is a form of activity that will work for you. And the more physical activity you can do, the better your overall health will be.  What kinds of activity can help you stay healthy?  Being more active will make your daily activities easier. Physical activity includes planned exercise and things you do in daily life. There are four types of activity:  Aerobic.  Doing aerobic activity makes your heart and lungs strong.  Includes walking, dancing, and gardening.  Aim for at least 2½ hours spread throughout the week.  It improves your energy and can help you sleep better.  Muscle-strengthening.  This type of activity can help maintain muscle and strengthen bones.  Includes climbing stairs, using resistance bands, and lifting or carrying heavy loads.  Aim for at least twice a week.  It can help protect the knees and other joints.  Stretching.  Stretching gives you better range of motion in joints and muscles.  Includes upper arm stretches, calf stretches, and gentle yoga.  Aim for at least twice a week, preferably after your muscles are warmed up from other activities.  It can help you function better in daily

## 2025-03-26 NOTE — ASSESSMENT & PLAN NOTE
Advised patient to quit and offered support.  Recommended nicotine replacement with patches, reviewed use and dosing.  Recommended nicotine gum, reviewed use and dosing.      Orders:    XR CHEST PA LAT (2 VIEWS); Future

## 2025-03-26 NOTE — PROGRESS NOTES
Lisa Serrano (:  1960) is a 65 y.o. female,Established patient, here for evaluation of the following chief complaint(s):  Follow-Up from Hospital      Assessment & Plan  Community acquired pneumonia of right lower lobe of lung     Start Augmentin and Prednisone as prescribed.  Follow up PA and Lateral CXR today - will contact with results.  Encouraged use of incentive spirometer 8-10 times per hour during waking hour.  Home monitoring of SpO2 via pulse oximeter; she will inquire with pharmacy for purchase.  Educated to seek immediate care if oxygen saturations remain below 90%.   Discussed possible use of pulmonary rehab in the future; she will consider.    Orders:    amoxicillin-clavulanate (AUGMENTIN) 875-125 MG per tablet; Take 1 tablet by mouth 2 times daily for 10 days    predniSONE (DELTASONE) 20 MG tablet; 3 tabs daily x 2 days; 2 tabs daily x 2 days; 1 tab daily x 2 days; 1/2 tab daily x 2 days; then d/c    XR CHEST PA LAT (2 VIEWS); Future    Chronic obstructive pulmonary disease, unspecified COPD type (HCC)     Managed by pulmonary.  Continue albuterol, DuoNeb, and Pulmicort.  Continue home oxygen 3LPM; add humidifier (through [employer]).  Orders:    predniSONE (DELTASONE) 20 MG tablet; 3 tabs daily x 2 days; 2 tabs daily x 2 days; 1 tab daily x 2 days; 1/2 tab daily x 2 days; then d/c    XR CHEST PA LAT (2 VIEWS); Future    Do Not Resuscitate    Dependence on continuous supplemental oxygen       Orders:    XR CHEST PA LAT (2 VIEWS); Future    Do Not Resuscitate    Cigarette nicotine dependence with nicotine-induced disorder     Advised patient to quit and offered support.  Recommended nicotine replacement with patches, reviewed use and dosing.  Recommended nicotine gum, reviewed use and dosing.      Orders:    XR CHEST PA LAT (2 VIEWS); Future    Constipation, unspecified constipation type     Take OTC magnesium citrate as directed for constipation. Maintain adequate hydration.  Orders:

## 2025-03-31 ENCOUNTER — RESULTS FOLLOW-UP (OUTPATIENT)
Dept: INTERNAL MEDICINE CLINIC | Facility: CLINIC | Age: 65
End: 2025-03-31

## 2025-03-31 DIAGNOSIS — J18.9 COMMUNITY ACQUIRED PNEUMONIA OF RIGHT LOWER LOBE OF LUNG: Primary | ICD-10-CM

## 2025-03-31 DIAGNOSIS — J44.9 CHRONIC OBSTRUCTIVE PULMONARY DISEASE, UNSPECIFIED COPD TYPE (HCC): ICD-10-CM

## 2025-04-08 LAB
GLUCOSE BLD STRIP.AUTO-MCNC: 99 MG/DL (ref 65–100)
SERVICE CMNT-IMP: NORMAL

## 2025-04-16 LAB — GLUCOSE BLD STRIP.AUTO-MCNC: NORMAL MG/DL (ref 65–100)

## 2025-04-17 ENCOUNTER — RESULTS FOLLOW-UP (OUTPATIENT)
Dept: INTERNAL MEDICINE CLINIC | Facility: CLINIC | Age: 65
End: 2025-04-17

## 2025-04-17 ENCOUNTER — OFFICE VISIT (OUTPATIENT)
Age: 65
End: 2025-04-17
Payer: MEDICARE

## 2025-04-17 VITALS
RESPIRATION RATE: 19 BRPM | HEART RATE: 101 BPM | WEIGHT: 110 LBS | TEMPERATURE: 97.3 F | OXYGEN SATURATION: 97 % | HEIGHT: 66 IN | SYSTOLIC BLOOD PRESSURE: 118 MMHG | BODY MASS INDEX: 17.68 KG/M2 | DIASTOLIC BLOOD PRESSURE: 72 MMHG

## 2025-04-17 DIAGNOSIS — Z66 DNR (DO NOT RESUSCITATE): ICD-10-CM

## 2025-04-17 DIAGNOSIS — R91.8 LUNG NODULES: ICD-10-CM

## 2025-04-17 DIAGNOSIS — J96.11 CHRONIC RESPIRATORY FAILURE WITH HYPOXIA: ICD-10-CM

## 2025-04-17 DIAGNOSIS — J18.9 PNEUMONIA OF RIGHT LOWER LOBE DUE TO INFECTIOUS ORGANISM: ICD-10-CM

## 2025-04-17 DIAGNOSIS — F17.210 CIGARETTE NICOTINE DEPENDENCE WITHOUT COMPLICATION: ICD-10-CM

## 2025-04-17 DIAGNOSIS — J44.9 STAGE 4 VERY SEVERE COPD BY GOLD CLASSIFICATION (HCC): Primary | ICD-10-CM

## 2025-04-17 DIAGNOSIS — K21.9 GASTROESOPHAGEAL REFLUX DISEASE WITHOUT ESOPHAGITIS: ICD-10-CM

## 2025-04-17 DIAGNOSIS — R64 PULMONARY CACHEXIA DUE TO COPD (HCC): ICD-10-CM

## 2025-04-17 DIAGNOSIS — J44.9 PULMONARY CACHEXIA DUE TO COPD (HCC): ICD-10-CM

## 2025-04-17 PROCEDURE — 3017F COLORECTAL CA SCREEN DOC REV: CPT | Performed by: NURSE PRACTITIONER

## 2025-04-17 PROCEDURE — G8399 PT W/DXA RESULTS DOCUMENT: HCPCS | Performed by: NURSE PRACTITIONER

## 2025-04-17 PROCEDURE — 99215 OFFICE O/P EST HI 40 MIN: CPT | Performed by: NURSE PRACTITIONER

## 2025-04-17 PROCEDURE — 3023F SPIROM DOC REV: CPT | Performed by: NURSE PRACTITIONER

## 2025-04-17 PROCEDURE — 99497 ADVNCD CARE PLAN 30 MIN: CPT | Performed by: NURSE PRACTITIONER

## 2025-04-17 PROCEDURE — 3074F SYST BP LT 130 MM HG: CPT | Performed by: NURSE PRACTITIONER

## 2025-04-17 PROCEDURE — G8427 DOCREV CUR MEDS BY ELIG CLIN: HCPCS | Performed by: NURSE PRACTITIONER

## 2025-04-17 PROCEDURE — 3078F DIAST BP <80 MM HG: CPT | Performed by: NURSE PRACTITIONER

## 2025-04-17 PROCEDURE — G8419 CALC BMI OUT NRM PARAM NOF/U: HCPCS | Performed by: NURSE PRACTITIONER

## 2025-04-17 PROCEDURE — 1123F ACP DISCUSS/DSCN MKR DOCD: CPT | Performed by: NURSE PRACTITIONER

## 2025-04-17 PROCEDURE — 4004F PT TOBACCO SCREEN RCVD TLK: CPT | Performed by: NURSE PRACTITIONER

## 2025-04-17 PROCEDURE — 1111F DSCHRG MED/CURRENT MED MERGE: CPT | Performed by: NURSE PRACTITIONER

## 2025-04-17 PROCEDURE — 1090F PRES/ABSN URINE INCON ASSESS: CPT | Performed by: NURSE PRACTITIONER

## 2025-04-17 RX ORDER — VARENICLINE TARTRATE 1 MG/1
1 TABLET, FILM COATED ORAL 2 TIMES DAILY
Qty: 60 TABLET | Refills: 3 | Status: SHIPPED | OUTPATIENT
Start: 2025-04-17

## 2025-04-17 RX ORDER — IPRATROPIUM BROMIDE AND ALBUTEROL SULFATE 2.5; .5 MG/3ML; MG/3ML
3 SOLUTION RESPIRATORY (INHALATION) 4 TIMES DAILY
Qty: 360 ML | Refills: 11 | Status: SHIPPED | OUTPATIENT
Start: 2025-04-17

## 2025-04-17 RX ORDER — ALBUTEROL SULFATE 90 UG/1
INHALANT RESPIRATORY (INHALATION)
Qty: 18 G | Refills: 11 | Status: SHIPPED | OUTPATIENT
Start: 2025-04-17

## 2025-04-17 RX ORDER — BUDESONIDE 0.5 MG/2ML
INHALANT ORAL
Qty: 60 EACH | Refills: 11 | Status: SHIPPED | OUTPATIENT
Start: 2025-04-17

## 2025-04-17 NOTE — ACP (ADVANCE CARE PLANNING)
Advance Care Planning     Advance Care Planning (ACP) Physician/NP/PA Conversation    Date of Conversation: 4/17/2025  Conducted with: Patient with Decision Making Capacity    Healthcare Decision Maker:      Primary Decision Maker: Moises Serrano - Spouse - [phone]    Secondary Decision Maker: Mar Tafoya - Child - [phone]    Supplemental (Other) Decision Maker: Mary Rosales - Child - [phone]    Click here to complete Healthcare Decision Makers including selection of the Healthcare Decision Maker Relationship (ie \"Primary\")      Care Preferences:    Hospitalization:  \"If your health worsens and it becomes clear that your chance of recovery is unlikely, what would be your preference regarding hospitalization?\"  The patient would prefer hospitalization.    Ventilation:  \"If you were unable to breath on your own and your chance of recovery was unlikely, what would be your preference about the use of a ventilator (breathing machine) if it was available to you?\"  Would agree to a time honored trial on the ventilator for 5-7 days, no tracheostomy    Resuscitation:  \"In the event your heart stopped as a result of an underlying serious health condition, would you want attempts made to restart your heart, or would you prefer a natural death?\"  No, do NOT attempt to resuscitate.    artificial nutrition, IV fluids and IV antibiotic trial.    Conversation Outcomes / Follow-Up Plan:  ACP complete - no further action today  Reviewed DNR/DNI and patient confirms current DNR status - completed forms on file (place new order if needed)    Length of Voluntary ACP Conversation in minutes:  16 minutes    Keiko Alicea, MARCELLO - CNP

## 2025-04-17 NOTE — PROGRESS NOTES
Name:  Lisa Serrano  YOB: 1960   MRN: [id number]      Office Visit: 4/17/2025        [place] [facility]     The patient (or guardian, if applicable) and other individuals in attendance with the patient were advised that Artificial Intelligence will be utilized during this visit to record, process the conversation to generate a clinical note, and support improvement of the AI technology. The patient (or guardian, if applicable) and other individuals in attendance at the appointment consented to the use of AI, including the recording.         Assessment & Plan (Medical Decision Making)      Impression: 65 y.o. female     Assessment & Plan  1. End-stage COPD with recent exacerbation requiring hospitalization.  She is to continue on DuoNeb 4 times daily and Pulmicort twice daily.  - albuterol sulfate HFA (PROVENTIL;VENTOLIN;PROAIR) 108 (90 Base) MCG/ACT inhaler; 2 puffs 4 times daily prn shortness of breath or wheezing  Dispense: 18 g; Refill: 11  - budesonide (PULMICORT) 0.5 MG/2ML nebulizer suspension; 1 vial via nebulizer twice daily, rinse mouth after use. Dx code j44.9  Dispense: 60 each; Refill: 11  - ipratropium 0.5 mg-albuterol 2.5 mg (DUONEB) 0.5-2.5 (3) MG/3ML SOLN nebulizer solution; Take 3 mLs by nebulization 4 times daily Dx code j44.9  Dispense: 360 mL; Refill: 11    2. Chronic respiratory failure with hypoxemia.  She remains on O2 at 2 to 3 L/min and reports benefit and compliance.    3. History of lung nodules.  These have been stable since 2023.    4. Ongoing smoking.  She is requesting a refill on Chantix. She had a screening CT scan last year and is due for another one this fall. However, due to the severity of her underlying lung disease and pulmonary cachexia, she would not be a candidate for surgery if cancer was found. Further screening CT scans will be held off.  - varenicline (CHANTIX) 1 MG tablet; Take 1 tablet by mouth 2 times daily  Dispense: 60 tablet; Refill: 3    5.

## 2025-06-13 DIAGNOSIS — K21.9 GASTROESOPHAGEAL REFLUX DISEASE, UNSPECIFIED WHETHER ESOPHAGITIS PRESENT: ICD-10-CM

## 2025-06-13 RX ORDER — FAMOTIDINE 40 MG/1
40 TABLET, FILM COATED ORAL NIGHTLY PRN
Qty: 90 TABLET | Refills: 1 | Status: SHIPPED | OUTPATIENT
Start: 2025-06-13

## 2025-06-16 RX ORDER — CITALOPRAM HYDROBROMIDE 40 MG/1
40 TABLET ORAL DAILY
Qty: 90 TABLET | Refills: 1 | Status: SHIPPED | OUTPATIENT
Start: 2025-06-16

## 2025-06-23 ENCOUNTER — TELEPHONE (OUTPATIENT)
Dept: PULMONOLOGY | Age: 65
End: 2025-06-23

## 2025-06-23 NOTE — TELEPHONE ENCOUNTER
Patient states she needs to be seen in the FI office thinks she has an upper respiratory infection.    She is coughing up thick green sputum, chest heaviness and tightness, increase shortness of breath, and fatigue for the last 3 days. No fever, chills, or wheezing. 02 levels staying around 92% on 3lpm 02.     Still smoking  0.5 pkpd,     Currently taking budesonide nebulizer solution BID, albuterol HFA  TID, DuoNeb nebulizer QID, no OTC medications.

## 2025-06-24 NOTE — TELEPHONE ENCOUNTER
Last Seen: 04/17/25 by Keiko Alicea NP  End-stage COPD, Chronic resp fail w/Hypoxemia, Hx of Lung Nodules, Ongoing smoker, reflux, PNA-LLL    Patient called in yesterday to report concerns of URI.  She stated that she has been suffering from a productive cough with thickk, green sputum, chest tightness, increased SOB and fatigue.  No fever, chills or wheezing noted at this time.  spO2 noted to be 92% on 3L continous O2.  Patient is still smoking 1/2 pk/day and is using respiratory medications daily as prescribed.    Called patient to obtain additional history.  Patient reports symptoms began on Saturday night and that she is always SOB.  She has not tried Mucinex as it makes her cough worse and occasionally when she coughs too much she will vomit. She does not use any form of humidification or CPAP device.  Patient has trouble getting to appointments and prefers to be seen at our FI office and since we are unable to get an appointment there a while, patient is wondering if it is possible to just get some medication sent into the pharmacy.  Advised patient that I would send a message to the provider with her symptoms and it would be up to the provider for recommendations.

## 2025-06-25 RX ORDER — DOXYCYCLINE HYCLATE 100 MG
100 TABLET ORAL 2 TIMES DAILY
Qty: 14 TABLET | Refills: 0 | Status: SHIPPED | OUTPATIENT
Start: 2025-06-25 | End: 2025-07-02

## 2025-06-25 RX ORDER — PREDNISONE 20 MG/1
TABLET ORAL
Qty: 15 TABLET | Refills: 0 | Status: SHIPPED | OUTPATIENT
Start: 2025-06-25

## 2025-06-25 NOTE — TELEPHONE ENCOUNTER
Called patient and advised of Ms. Paige recommendations.  Patient advised she had a flutter valve and would use after treatments.  Patient will call back if any additional questions or concerns.

## 2025-06-25 NOTE — TELEPHONE ENCOUNTER
Doxycycline 100mg po bid for 7 days, # 14, no refill.    Only begin Prednisone for wheezing/worsening shortness of breath;  I sent in 20 mg - 2 po q am pc for 3 days, 1 and 1/2 for 3 days, 1 for 3 days, 1/2 for 3 days, then stop or as directed.    She will need to keep close eye on blood sugar if she begins prednisone.    Recommend lower dose of guaifenesin-can try 1200 mg over 24 hours.    If she has flutter valve, recommend following each nebulizer treatment.  If she does not have that, can obtain online.    Can also consider adding nebulized saline in the future if she continues to have difficulty clearing secretions.

## 2025-07-10 ENCOUNTER — OFFICE VISIT (OUTPATIENT)
Age: 65
End: 2025-07-10
Payer: MEDICARE

## 2025-07-10 VITALS
WEIGHT: 109 LBS | HEIGHT: 66 IN | SYSTOLIC BLOOD PRESSURE: 102 MMHG | HEART RATE: 96 BPM | BODY MASS INDEX: 17.52 KG/M2 | DIASTOLIC BLOOD PRESSURE: 68 MMHG

## 2025-07-10 DIAGNOSIS — J44.9 STAGE 4 VERY SEVERE COPD BY GOLD CLASSIFICATION (HCC): ICD-10-CM

## 2025-07-10 DIAGNOSIS — I10 ESSENTIAL HYPERTENSION: ICD-10-CM

## 2025-07-10 DIAGNOSIS — I25.10 ASCVD (ARTERIOSCLEROTIC CARDIOVASCULAR DISEASE): Primary | ICD-10-CM

## 2025-07-10 PROCEDURE — G8399 PT W/DXA RESULTS DOCUMENT: HCPCS | Performed by: INTERNAL MEDICINE

## 2025-07-10 PROCEDURE — 1090F PRES/ABSN URINE INCON ASSESS: CPT | Performed by: INTERNAL MEDICINE

## 2025-07-10 PROCEDURE — 99214 OFFICE O/P EST MOD 30 MIN: CPT | Performed by: INTERNAL MEDICINE

## 2025-07-10 PROCEDURE — 3078F DIAST BP <80 MM HG: CPT | Performed by: INTERNAL MEDICINE

## 2025-07-10 PROCEDURE — 4004F PT TOBACCO SCREEN RCVD TLK: CPT | Performed by: INTERNAL MEDICINE

## 2025-07-10 PROCEDURE — G8419 CALC BMI OUT NRM PARAM NOF/U: HCPCS | Performed by: INTERNAL MEDICINE

## 2025-07-10 PROCEDURE — 3023F SPIROM DOC REV: CPT | Performed by: INTERNAL MEDICINE

## 2025-07-10 PROCEDURE — G8427 DOCREV CUR MEDS BY ELIG CLIN: HCPCS | Performed by: INTERNAL MEDICINE

## 2025-07-10 PROCEDURE — 3017F COLORECTAL CA SCREEN DOC REV: CPT | Performed by: INTERNAL MEDICINE

## 2025-07-10 PROCEDURE — 3074F SYST BP LT 130 MM HG: CPT | Performed by: INTERNAL MEDICINE

## 2025-07-10 PROCEDURE — 1123F ACP DISCUSS/DSCN MKR DOCD: CPT | Performed by: INTERNAL MEDICINE

## 2025-07-10 RX ORDER — DILTIAZEM HYDROCHLORIDE 180 MG/1
180 CAPSULE, EXTENDED RELEASE ORAL DAILY
Qty: 90 CAPSULE | Refills: 3 | Status: SHIPPED | OUTPATIENT
Start: 2025-07-10

## 2025-07-10 ASSESSMENT — ENCOUNTER SYMPTOMS
BOWEL INCONTINENCE: 0
SHORTNESS OF BREATH: 1
HEMATEMESIS: 0
HOARSE VOICE: 0
ABDOMINAL PAIN: 0
NAUSEA: 0
COLOR CHANGE: 0
HEMATOCHEZIA: 0
DIARRHEA: 0
VOMITING: 0
ORTHOPNEA: 0
BLURRED VISION: 0
CHEST TIGHTNESS: 0
COUGH: 0
WHEEZING: 0
SPUTUM PRODUCTION: 0

## 2025-07-10 NOTE — PROGRESS NOTES
[facility] CARDIOLOGY  [address] [place] [address], [address]  [place] [address]  PHONE: [phone]        07/10/25        NAME:  Lisa Serrano  : 1960  MRN: [id number]       SUBJECTIVE:   Lisa Serrano is a 65 y.o. female seen for a follow up visit regarding the following: The patient has CAD, COPD, history of CVA, history of PSVT, and primary hypertension.  She returns for scheduled follow-up.    Chief Complaint   Patient presents with    Hyperlipidemia    Hypertension       HPI:    Coronary Artery Disease  Presents for follow-up visit. Symptoms include palpitations and shortness of breath. Pertinent negatives include no chest pain, chest pressure, chest tightness, dizziness, leg swelling, muscle weakness or weight gain. The symptoms have been stable.   Palpitations   This is a chronic problem. The problem occurs rarely. The problem has been resolved. Nothing aggravates the symptoms. Associated symptoms include shortness of breath. Pertinent negatives include no anxiety, chest fullness, chest pain, coughing, diaphoresis, dizziness, fever, irregular heartbeat, malaise/fatigue, nausea, near-syncope, numbness, syncope, vomiting or weakness.       Past Medical History, Past Surgical History, Family history, Social History, and Medications were all reviewed with the patient today and updated as necessary.         Current Outpatient Medications:     dilTIAZem (DILACOR XR) 180 MG extended release capsule, Take 1 capsule by mouth daily, Disp: 90 capsule, Rfl: 3    citalopram (CELEXA) 40 MG tablet, TAKE 1 TABLET BY MOUTH EVERY DAY, Disp: 90 tablet, Rfl: 1    famotidine (PEPCID) 40 MG tablet, TAKE 1 TABLET BY MOUTH NIGHTLY AS NEEDED (GERD), Disp: 90 tablet, Rfl: 1    albuterol sulfate HFA (PROVENTIL;VENTOLIN;PROAIR) 108 (90 Base) MCG/ACT inhaler, 2 puffs 4 times daily prn shortness of breath or wheezing, Disp: 18 g, Rfl: 11    budesonide (PULMICORT) 0.5 MG/2ML nebulizer suspension, 1 vial via nebulizer twice daily,

## 2025-07-28 ENCOUNTER — OFFICE VISIT (OUTPATIENT)
Dept: INTERNAL MEDICINE CLINIC | Facility: CLINIC | Age: 65
End: 2025-07-28
Payer: MEDICARE

## 2025-07-28 VITALS — HEIGHT: 66 IN | WEIGHT: 109 LBS | BODY MASS INDEX: 17.52 KG/M2

## 2025-07-28 DIAGNOSIS — J44.9 STAGE 4 VERY SEVERE COPD BY GOLD CLASSIFICATION (HCC): Primary | ICD-10-CM

## 2025-07-28 DIAGNOSIS — J96.11 CHRONIC RESPIRATORY FAILURE WITH HYPOXIA (HCC): ICD-10-CM

## 2025-07-28 DIAGNOSIS — I10 ESSENTIAL HYPERTENSION: ICD-10-CM

## 2025-07-28 DIAGNOSIS — M51.362 DEGENERATION OF INTERVERTEBRAL DISC OF LUMBAR REGION WITH DISCOGENIC BACK PAIN AND LOWER EXTREMITY PAIN: ICD-10-CM

## 2025-07-28 DIAGNOSIS — E11.9 TYPE 2 DIABETES MELLITUS WITHOUT COMPLICATION, WITHOUT LONG-TERM CURRENT USE OF INSULIN (HCC): ICD-10-CM

## 2025-07-28 DIAGNOSIS — E78.2 MIXED HYPERLIPIDEMIA: ICD-10-CM

## 2025-07-28 DIAGNOSIS — E44.0 MODERATE PROTEIN-CALORIE MALNUTRITION: ICD-10-CM

## 2025-07-28 DIAGNOSIS — G89.4 CHRONIC PAIN SYNDROME: ICD-10-CM

## 2025-07-28 DIAGNOSIS — M15.0 PRIMARY OSTEOARTHRITIS INVOLVING MULTIPLE JOINTS: ICD-10-CM

## 2025-07-28 DIAGNOSIS — Z99.81 DEPENDENCE ON CONTINUOUS SUPPLEMENTAL OXYGEN: ICD-10-CM

## 2025-07-28 PROBLEM — J44.1 COPD EXACERBATION (HCC): Status: RESOLVED | Noted: 2025-03-19 | Resolved: 2025-07-28

## 2025-07-28 LAB
ALBUMIN SERPL-MCNC: 4.1 G/DL (ref 3.2–4.6)
ALBUMIN/GLOB SERPL: 1.2 (ref 1–1.9)
ALP SERPL-CCNC: 94 U/L (ref 35–104)
ALT SERPL-CCNC: 17 U/L (ref 8–45)
ANION GAP SERPL CALC-SCNC: 14 MMOL/L (ref 7–16)
AST SERPL-CCNC: 22 U/L (ref 15–37)
BILIRUB SERPL-MCNC: 0.4 MG/DL (ref 0–1.2)
BUN SERPL-MCNC: 14 MG/DL (ref 8–23)
CALCIUM SERPL-MCNC: 10.5 MG/DL (ref 8.8–10.2)
CHLORIDE SERPL-SCNC: 99 MMOL/L (ref 98–107)
CHOLEST SERPL-MCNC: 166 MG/DL (ref 0–200)
CO2 SERPL-SCNC: 27 MMOL/L (ref 20–29)
CREAT SERPL-MCNC: 0.64 MG/DL (ref 0.6–1.1)
EST. AVERAGE GLUCOSE BLD GHB EST-MCNC: 127 MG/DL
GLOBULIN SER CALC-MCNC: 3.4 G/DL (ref 2.3–3.5)
GLUCOSE SERPL-MCNC: 116 MG/DL (ref 70–99)
HBA1C MFR BLD: 6.1 % (ref 0–5.6)
HDLC SERPL-MCNC: 72 MG/DL (ref 40–60)
HDLC SERPL: 2.3 (ref 0–5)
LDLC SERPL CALC-MCNC: 81 MG/DL (ref 0–100)
POTASSIUM SERPL-SCNC: 4.8 MMOL/L (ref 3.5–5.1)
PROT SERPL-MCNC: 7.5 G/DL (ref 6.3–8.2)
SODIUM SERPL-SCNC: 140 MMOL/L (ref 136–145)
TRIGL SERPL-MCNC: 68 MG/DL (ref 0–150)
VLDLC SERPL CALC-MCNC: 14 MG/DL (ref 6–23)

## 2025-07-28 PROCEDURE — 99214 OFFICE O/P EST MOD 30 MIN: CPT | Performed by: INTERNAL MEDICINE

## 2025-07-28 PROCEDURE — 1123F ACP DISCUSS/DSCN MKR DOCD: CPT | Performed by: INTERNAL MEDICINE

## 2025-07-28 PROCEDURE — G8399 PT W/DXA RESULTS DOCUMENT: HCPCS | Performed by: INTERNAL MEDICINE

## 2025-07-28 PROCEDURE — 4004F PT TOBACCO SCREEN RCVD TLK: CPT | Performed by: INTERNAL MEDICINE

## 2025-07-28 PROCEDURE — 3017F COLORECTAL CA SCREEN DOC REV: CPT | Performed by: INTERNAL MEDICINE

## 2025-07-28 PROCEDURE — G8419 CALC BMI OUT NRM PARAM NOF/U: HCPCS | Performed by: INTERNAL MEDICINE

## 2025-07-28 PROCEDURE — 3044F HG A1C LEVEL LT 7.0%: CPT | Performed by: INTERNAL MEDICINE

## 2025-07-28 PROCEDURE — 2022F DILAT RTA XM EVC RTNOPTHY: CPT | Performed by: INTERNAL MEDICINE

## 2025-07-28 PROCEDURE — G8427 DOCREV CUR MEDS BY ELIG CLIN: HCPCS | Performed by: INTERNAL MEDICINE

## 2025-07-28 PROCEDURE — 3023F SPIROM DOC REV: CPT | Performed by: INTERNAL MEDICINE

## 2025-07-28 PROCEDURE — 1090F PRES/ABSN URINE INCON ASSESS: CPT | Performed by: INTERNAL MEDICINE

## 2025-07-28 RX ORDER — TRAMADOL HYDROCHLORIDE 50 MG/1
50 TABLET ORAL DAILY
COMMUNITY
End: 2025-07-28 | Stop reason: SDUPTHER

## 2025-07-28 RX ORDER — ATORVASTATIN CALCIUM 40 MG/1
40 TABLET, FILM COATED ORAL DAILY
Qty: 90 TABLET | Refills: 1 | Status: SHIPPED | OUTPATIENT
Start: 2025-07-28

## 2025-07-28 RX ORDER — TRAMADOL HYDROCHLORIDE 50 MG/1
50 TABLET ORAL DAILY
Qty: 30 TABLET | Refills: 1 | Status: SHIPPED | OUTPATIENT
Start: 2025-07-28 | End: 2025-09-26

## 2025-07-28 ASSESSMENT — ENCOUNTER SYMPTOMS: SHORTNESS OF BREATH: 1

## 2025-07-28 NOTE — PROGRESS NOTES
HPI: Lisa Serrano (: 1960)    On oxygen and continues to progressively decline    Considering Hospice care in near future    Problem List:  Patient Active Problem List   Diagnosis   • Dependence on continuous supplemental oxygen   • Polycythemia   • Chronic respiratory failure with hypoxia ([facility])   • Tracheobronchitis   • Memory changes   • Vitamin D deficiency   • Mixed simple and mucopurulent chronic bronchitis (HCC)   • Palpitations   • Cystitis   • Diabetes ([facility])   • Irritable bowel syndrome   • Chronic pain syndrome   • Snoring   • Chronic cough   • Fibromyalgia   • Essential hypertension   • Tobacco use   • Depression   • Anxiety   • Cigarette nicotine dependence with nicotine-induced disorder   • Angina pectoris   • ASCVD (arteriosclerotic cardiovascular disease)   • Stage 4 very severe COPD by GOLD classification ([facility])   • Bursitis   • Liver disease   • GERD (gastroesophageal reflux disease)   • Mixed hyperlipidemia   • Lung nodules   • CVA (cerebral vascular accident) ([facility])   • Osteoarthritis   • Degeneration of lumbar intervertebral disc   • Insomnia   • Pain in joint, pelvic region and thigh   • Signs and symptoms involving cognition   • Rheumatism   • On statin therapy   • Abnormal chest CT   • Bronchiectasis without complication ([facility])   • Pulmonary infiltrates   • Weight loss   • Vision loss of right eye   • Personal history of transient ischemic attack (TIA), and cerebral infarction without residual deficits   • Elevated blood sugar   • At high risk for malnutrition   • Pulmonary cachexia due to COPD ([facility])   • Moderate protein-calorie malnutrition       History:  Past Medical History:   Diagnosis Date   • Acute renal failure 2013    Baseline creatinine was 0.8, and currently it is 2.9    • Acute respiratory failure (HCC) 2013   • Adverse effect of anesthesia     difficulty waking up   • Arthritis    • Back pain    • CAD (coronary artery disease)    • CAP (community acquired

## 2025-08-13 ENCOUNTER — PATIENT MESSAGE (OUTPATIENT)
Age: 65
End: 2025-08-13

## 2025-08-14 ENCOUNTER — TELEPHONE (OUTPATIENT)
Dept: PULMONOLOGY | Age: 65
End: 2025-08-14

## 2025-08-14 RX ORDER — DOXYCYCLINE HYCLATE 100 MG
100 TABLET ORAL 2 TIMES DAILY
Qty: 14 TABLET | Refills: 0 | Status: SHIPPED | OUTPATIENT
Start: 2025-08-14 | End: 2025-08-21

## 2025-08-16 ENCOUNTER — APPOINTMENT (OUTPATIENT)
Dept: GENERAL RADIOLOGY | Age: 65
End: 2025-08-16
Payer: MEDICARE

## 2025-08-16 ENCOUNTER — HOSPITAL ENCOUNTER (EMERGENCY)
Age: 65
Discharge: HOME | End: 2025-08-16
Attending: EMERGENCY MEDICINE
Payer: MEDICARE

## 2025-08-16 VITALS
SYSTOLIC BLOOD PRESSURE: 140 MMHG | OXYGEN SATURATION: 93 % | RESPIRATION RATE: 24 BRPM | BODY MASS INDEX: 18.29 KG/M2 | DIASTOLIC BLOOD PRESSURE: 71 MMHG | HEART RATE: 104 BPM | WEIGHT: 113.32 LBS | TEMPERATURE: 97.9 F

## 2025-08-16 DIAGNOSIS — J18.9 PNEUMONIA OF RIGHT LOWER LOBE DUE TO INFECTIOUS ORGANISM: ICD-10-CM

## 2025-08-16 DIAGNOSIS — J44.1 COPD EXACERBATION (HCC): Primary | ICD-10-CM

## 2025-08-16 LAB
ALBUMIN SERPL-MCNC: 4.2 G/DL (ref 3.2–4.6)
ALBUMIN/GLOB SERPL: 1.1 (ref 1–1.9)
ALP SERPL-CCNC: 96 U/L (ref 35–104)
ALT SERPL-CCNC: 5 U/L (ref 12–65)
ANION GAP SERPL CALC-SCNC: 13 MMOL/L (ref 7–16)
APPEARANCE UR: CLEAR
AST SERPL-CCNC: 18 U/L (ref 15–37)
BACTERIA URNS QL MICRO: ABNORMAL /HPF
BASE EXCESS BLDV CALC-SCNC: 4 MMOL/L
BASOPHILS # BLD: 0.05 K/UL (ref 0–0.2)
BASOPHILS NFR BLD: 0.4 % (ref 0–2)
BILIRUB SERPL-MCNC: 0.3 MG/DL (ref 0–1.2)
BILIRUB UR QL: NEGATIVE
BUN SERPL-MCNC: 11 MG/DL (ref 8–23)
CALCIUM SERPL-MCNC: 10 MG/DL (ref 8.8–10.2)
CHLORIDE SERPL-SCNC: 102 MMOL/L (ref 98–107)
CO2 SERPL-SCNC: 28 MMOL/L (ref 20–29)
COLOR UR: YELLOW
CREAT SERPL-MCNC: 0.59 MG/DL (ref 0.8–1.3)
DIFFERENTIAL METHOD BLD: ABNORMAL
EKG ATRIAL RATE: 114 BPM
EKG DIAGNOSIS: NORMAL
EKG P AXIS: 86 DEGREES
EKG P-R INTERVAL: 165 MS
EKG Q-T INTERVAL: 349 MS
EKG QRS DURATION: 79 MS
EKG QTC CALCULATION (BAZETT): 479 MS
EKG R AXIS: 72 DEGREES
EKG T AXIS: 88 DEGREES
EKG VENTRICULAR RATE: 113 BPM
EOSINOPHIL # BLD: 0.07 K/UL (ref 0–0.8)
EOSINOPHIL NFR BLD: 0.5 % (ref 0.5–7.8)
EPI CELLS #/AREA URNS HPF: ABNORMAL /HPF
ERYTHROCYTE [DISTWIDTH] IN BLOOD BY AUTOMATED COUNT: 13.6 % (ref 11.9–14.6)
GLOBULIN SER CALC-MCNC: 3.8 G/DL (ref 2.3–3.5)
GLUCOSE BLD STRIP.AUTO-MCNC: 105 MG/DL (ref 65–100)
GLUCOSE SERPL-MCNC: 114 MG/DL (ref 70–99)
GLUCOSE UR STRIP.AUTO-MCNC: NEGATIVE MG/DL
HCO3 BLDV-SCNC: 30.3 MMOL/L (ref 22–29)
HCT VFR BLD AUTO: 45.7 % (ref 35.8–46.3)
HGB BLD-MCNC: 15 G/DL (ref 11.7–15.4)
HGB UR QL STRIP: ABNORMAL
IMM GRANULOCYTES # BLD AUTO: 0.03 K/UL (ref 0–0.5)
IMM GRANULOCYTES NFR BLD AUTO: 0.2 % (ref 0–5)
KETONES UR QL STRIP.AUTO: NEGATIVE MG/DL
LACTATE SERPL-SCNC: 1.3 MMOL/L (ref 0.5–2)
LEUKOCYTE ESTERASE UR QL STRIP.AUTO: NEGATIVE
LYMPHOCYTES # BLD: 0.99 K/UL (ref 0.5–4.6)
LYMPHOCYTES NFR BLD: 7.4 % (ref 13–44)
MCH RBC QN AUTO: 30.5 PG (ref 26.1–32.9)
MCHC RBC AUTO-ENTMCNC: 32.8 G/DL (ref 31.4–35)
MCV RBC AUTO: 92.9 FL (ref 82–102)
MONOCYTES # BLD: 0.97 K/UL (ref 0.1–1.3)
MONOCYTES NFR BLD: 7.3 % (ref 4–12)
MUCOUS THREADS URNS QL MICRO: 0 /LPF
NEUTS SEG # BLD: 11.22 K/UL (ref 1.7–8.2)
NEUTS SEG NFR BLD: 84.2 % (ref 43–78)
NITRITE UR QL STRIP.AUTO: NEGATIVE
NRBC # BLD: 0 K/UL (ref 0–0.2)
OTHER OBSERVATIONS: ABNORMAL
PCO2 BLDV: 50.3 MMHG (ref 41–51)
PH BLDV: 7.39 (ref 7.32–7.42)
PH UR STRIP: 7 (ref 5–9)
PLATELET # BLD AUTO: 312 K/UL (ref 150–450)
PMV BLD AUTO: 8.1 FL (ref 9.4–12.3)
PO2 BLDV: 33 MMHG
POTASSIUM SERPL-SCNC: 3.5 MMOL/L (ref 3.5–5.1)
PROCALCITONIN SERPL-MCNC: 0.03 NG/ML (ref 0–0.49)
PROT SERPL-MCNC: 8 G/DL (ref 6.3–8.2)
PROT UR STRIP-MCNC: 30 MG/DL
RBC # BLD AUTO: 4.92 M/UL (ref 4.05–5.2)
RBC #/AREA URNS HPF: ABNORMAL /HPF
SAO2 % BLDV: 61.9 % (ref 65–88)
SERVICE CMNT-IMP: ABNORMAL
SERVICE CMNT-IMP: ABNORMAL
SODIUM SERPL-SCNC: 143 MMOL/L (ref 133–143)
SP GR UR REFRACTOMETRY: 1.02 (ref 1–1.02)
SPECIMEN TYPE: ABNORMAL
UROBILINOGEN UR QL STRIP.AUTO: 0.2 EU/DL (ref 0.2–1)
WBC # BLD AUTO: 13.3 K/UL (ref 4.3–11.1)
WBC URNS QL MICRO: ABNORMAL /HPF

## 2025-08-16 PROCEDURE — 6370000000 HC RX 637 (ALT 250 FOR IP): Performed by: EMERGENCY MEDICINE

## 2025-08-16 PROCEDURE — 85025 COMPLETE CBC W/AUTO DIFF WBC: CPT

## 2025-08-16 PROCEDURE — 93010 ELECTROCARDIOGRAM REPORT: CPT | Performed by: INTERNAL MEDICINE

## 2025-08-16 PROCEDURE — 80053 COMPREHEN METABOLIC PANEL: CPT

## 2025-08-16 PROCEDURE — 93005 ELECTROCARDIOGRAM TRACING: CPT | Performed by: EMERGENCY MEDICINE

## 2025-08-16 PROCEDURE — 81001 URINALYSIS AUTO W/SCOPE: CPT

## 2025-08-16 PROCEDURE — 71045 X-RAY EXAM CHEST 1 VIEW: CPT

## 2025-08-16 PROCEDURE — 96374 THER/PROPH/DIAG INJ IV PUSH: CPT

## 2025-08-16 PROCEDURE — 82962 GLUCOSE BLOOD TEST: CPT

## 2025-08-16 PROCEDURE — 99285 EMERGENCY DEPT VISIT HI MDM: CPT

## 2025-08-16 PROCEDURE — 82803 BLOOD GASES ANY COMBINATION: CPT

## 2025-08-16 PROCEDURE — 2500000003 HC RX 250 WO HCPCS: Performed by: EMERGENCY MEDICINE

## 2025-08-16 PROCEDURE — 84145 PROCALCITONIN (PCT): CPT

## 2025-08-16 PROCEDURE — 6360000002 HC RX W HCPCS: Performed by: EMERGENCY MEDICINE

## 2025-08-16 PROCEDURE — 83605 ASSAY OF LACTIC ACID: CPT

## 2025-08-16 RX ORDER — IPRATROPIUM BROMIDE AND ALBUTEROL SULFATE 2.5; .5 MG/3ML; MG/3ML
1 SOLUTION RESPIRATORY (INHALATION)
Status: COMPLETED | OUTPATIENT
Start: 2025-08-16 | End: 2025-08-16

## 2025-08-16 RX ORDER — PREDNISONE 20 MG/1
20 TABLET ORAL 2 TIMES DAILY
Qty: 10 TABLET | Refills: 0 | Status: SHIPPED | OUTPATIENT
Start: 2025-08-16 | End: 2025-08-21

## 2025-08-16 RX ORDER — LORAZEPAM 0.5 MG/1
0.5 TABLET ORAL 3 TIMES DAILY PRN
Qty: 12 TABLET | Refills: 0 | Status: SHIPPED | OUTPATIENT
Start: 2025-08-16 | End: 2025-09-15

## 2025-08-16 RX ORDER — LORAZEPAM 1 MG/1
0.5 TABLET ORAL ONCE
Status: COMPLETED | OUTPATIENT
Start: 2025-08-16 | End: 2025-08-16

## 2025-08-16 RX ADMIN — LORAZEPAM 0.5 MG: 1 TABLET ORAL at 11:07

## 2025-08-16 RX ADMIN — IPRATROPIUM BROMIDE AND ALBUTEROL SULFATE 1 DOSE: 2.5; .5 SOLUTION RESPIRATORY (INHALATION) at 08:35

## 2025-08-16 RX ADMIN — WATER 125 MG: 1 INJECTION INTRAMUSCULAR; INTRAVENOUS; SUBCUTANEOUS at 08:35

## 2025-08-16 ASSESSMENT — PAIN DESCRIPTION - LOCATION: LOCATION: RIB CAGE

## 2025-08-16 ASSESSMENT — PAIN SCALES - GENERAL
PAINLEVEL_OUTOF10: 0
PAINLEVEL_OUTOF10: 7

## 2025-08-16 ASSESSMENT — PAIN - FUNCTIONAL ASSESSMENT
PAIN_FUNCTIONAL_ASSESSMENT: 0-10
PAIN_FUNCTIONAL_ASSESSMENT: 0-10

## 2025-08-18 ENCOUNTER — TELEMEDICINE (OUTPATIENT)
Dept: INTERNAL MEDICINE CLINIC | Facility: CLINIC | Age: 65
End: 2025-08-18
Payer: MEDICARE

## 2025-08-18 DIAGNOSIS — J18.9 COMMUNITY ACQUIRED PNEUMONIA OF RIGHT LOWER LOBE OF LUNG: Primary | ICD-10-CM

## 2025-08-18 DIAGNOSIS — Z99.81 DEPENDENCE ON CONTINUOUS SUPPLEMENTAL OXYGEN: ICD-10-CM

## 2025-08-18 DIAGNOSIS — J44.9 STAGE 4 VERY SEVERE COPD BY GOLD CLASSIFICATION (HCC): ICD-10-CM

## 2025-08-18 PROCEDURE — 4004F PT TOBACCO SCREEN RCVD TLK: CPT | Performed by: NURSE PRACTITIONER

## 2025-08-18 PROCEDURE — G8427 DOCREV CUR MEDS BY ELIG CLIN: HCPCS | Performed by: NURSE PRACTITIONER

## 2025-08-18 PROCEDURE — 3023F SPIROM DOC REV: CPT | Performed by: NURSE PRACTITIONER

## 2025-08-18 PROCEDURE — 99214 OFFICE O/P EST MOD 30 MIN: CPT | Performed by: NURSE PRACTITIONER

## 2025-08-18 PROCEDURE — 1090F PRES/ABSN URINE INCON ASSESS: CPT | Performed by: NURSE PRACTITIONER

## 2025-08-18 PROCEDURE — G2211 COMPLEX E/M VISIT ADD ON: HCPCS | Performed by: NURSE PRACTITIONER

## 2025-08-18 PROCEDURE — G8419 CALC BMI OUT NRM PARAM NOF/U: HCPCS | Performed by: NURSE PRACTITIONER

## 2025-08-18 PROCEDURE — 3017F COLORECTAL CA SCREEN DOC REV: CPT | Performed by: NURSE PRACTITIONER

## 2025-08-18 PROCEDURE — 1123F ACP DISCUSS/DSCN MKR DOCD: CPT | Performed by: NURSE PRACTITIONER

## 2025-08-18 PROCEDURE — G8399 PT W/DXA RESULTS DOCUMENT: HCPCS | Performed by: NURSE PRACTITIONER

## 2025-08-18 RX ORDER — CEFUROXIME AXETIL 250 MG/1
250 TABLET ORAL 2 TIMES DAILY
Qty: 20 TABLET | Refills: 0 | Status: SHIPPED | OUTPATIENT
Start: 2025-08-18 | End: 2025-08-28

## 2025-08-18 ASSESSMENT — ENCOUNTER SYMPTOMS
APNEA: 0
COUGH: 1
CHOKING: 0
SHORTNESS OF BREATH: 1

## 2025-08-19 ENCOUNTER — HOSPITAL ENCOUNTER (OUTPATIENT)
Dept: CT IMAGING | Age: 65
Discharge: HOME | End: 2025-08-21
Payer: MEDICARE

## 2025-08-19 DIAGNOSIS — J18.9 COMMUNITY ACQUIRED PNEUMONIA OF RIGHT LOWER LOBE OF LUNG: ICD-10-CM

## 2025-08-19 DIAGNOSIS — J44.9 STAGE 4 VERY SEVERE COPD BY GOLD CLASSIFICATION (HCC): ICD-10-CM

## 2025-08-19 PROCEDURE — 71250 CT THORAX DX C-: CPT

## 2025-08-27 DIAGNOSIS — J44.9 STAGE 4 VERY SEVERE COPD BY GOLD CLASSIFICATION (HCC): Primary | ICD-10-CM

## 2025-08-27 DIAGNOSIS — F41.8 SITUATIONAL ANXIETY: ICD-10-CM

## 2025-08-27 RX ORDER — LORAZEPAM 0.5 MG/1
0.5 TABLET ORAL EVERY 8 HOURS PRN
Qty: 30 TABLET | Refills: 0 | Status: SHIPPED | OUTPATIENT
Start: 2025-08-27 | End: 2025-09-26